# Patient Record
Sex: MALE | Race: WHITE | NOT HISPANIC OR LATINO | Employment: OTHER | URBAN - METROPOLITAN AREA
[De-identification: names, ages, dates, MRNs, and addresses within clinical notes are randomized per-mention and may not be internally consistent; named-entity substitution may affect disease eponyms.]

---

## 2020-04-24 DIAGNOSIS — Z01.84 IMMUNITY STATUS TESTING: Primary | ICD-10-CM

## 2020-04-27 ENCOUNTER — LAB VISIT (OUTPATIENT)
Dept: LAB | Facility: HOSPITAL | Age: 65
End: 2020-04-27
Attending: FAMILY MEDICINE
Payer: MEDICARE

## 2020-04-27 DIAGNOSIS — Z01.84 IMMUNITY STATUS TESTING: ICD-10-CM

## 2020-04-27 LAB — SARS-COV-2 IGG SERPL QL IA: NEGATIVE

## 2020-04-27 PROCEDURE — 86769 SARS-COV-2 COVID-19 ANTIBODY: CPT

## 2020-04-27 PROCEDURE — 36415 COLL VENOUS BLD VENIPUNCTURE: CPT

## 2020-12-21 ENCOUNTER — IMMUNIZATION (OUTPATIENT)
Dept: FAMILY MEDICINE | Facility: CLINIC | Age: 65
End: 2020-12-21
Payer: MEDICARE

## 2020-12-21 DIAGNOSIS — Z23 NEED FOR VACCINATION: ICD-10-CM

## 2020-12-21 PROCEDURE — 0001A COVID-19, MRNA, LNP-S, PF, 30 MCG/0.3 ML DOSE VACCINE: CPT | Mod: S$GLB,,, | Performed by: INTERNAL MEDICINE

## 2020-12-21 PROCEDURE — 0001A COVID-19, MRNA, LNP-S, PF, 30 MCG/0.3 ML DOSE VACCINE: ICD-10-PCS | Mod: S$GLB,,, | Performed by: INTERNAL MEDICINE

## 2020-12-21 PROCEDURE — 91300 COVID-19, MRNA, LNP-S, PF, 30 MCG/0.3 ML DOSE VACCINE: CPT | Mod: S$GLB,,, | Performed by: INTERNAL MEDICINE

## 2020-12-21 PROCEDURE — 91300 COVID-19, MRNA, LNP-S, PF, 30 MCG/0.3 ML DOSE VACCINE: ICD-10-PCS | Mod: S$GLB,,, | Performed by: INTERNAL MEDICINE

## 2021-01-06 ENCOUNTER — PATIENT MESSAGE (OUTPATIENT)
Dept: ADMINISTRATIVE | Facility: OTHER | Age: 66
End: 2021-01-06

## 2021-01-11 ENCOUNTER — IMMUNIZATION (OUTPATIENT)
Dept: FAMILY MEDICINE | Facility: CLINIC | Age: 66
End: 2021-01-11
Payer: MEDICARE

## 2021-01-11 DIAGNOSIS — Z23 NEED FOR VACCINATION: ICD-10-CM

## 2021-01-11 PROCEDURE — 91300 COVID-19, MRNA, LNP-S, PF, 30 MCG/0.3 ML DOSE VACCINE: ICD-10-PCS | Mod: S$GLB,,, | Performed by: INTERNAL MEDICINE

## 2021-01-11 PROCEDURE — 91300 COVID-19, MRNA, LNP-S, PF, 30 MCG/0.3 ML DOSE VACCINE: CPT | Mod: S$GLB,,, | Performed by: INTERNAL MEDICINE

## 2021-01-11 PROCEDURE — 0002A COVID-19, MRNA, LNP-S, PF, 30 MCG/0.3 ML DOSE VACCINE: CPT | Mod: CV19,S$GLB,, | Performed by: INTERNAL MEDICINE

## 2021-01-11 PROCEDURE — 0002A COVID-19, MRNA, LNP-S, PF, 30 MCG/0.3 ML DOSE VACCINE: ICD-10-PCS | Mod: CV19,S$GLB,, | Performed by: INTERNAL MEDICINE

## 2021-08-04 ENCOUNTER — TELEPHONE (OUTPATIENT)
Dept: ORTHOPEDICS | Facility: CLINIC | Age: 66
End: 2021-08-04

## 2021-08-10 DIAGNOSIS — M25.512 LEFT SHOULDER PAIN, UNSPECIFIED CHRONICITY: Primary | ICD-10-CM

## 2021-08-10 DIAGNOSIS — M25.512 LEFT SHOULDER PAIN, UNSPECIFIED CHRONICITY: ICD-10-CM

## 2021-08-16 ENCOUNTER — HOSPITAL ENCOUNTER (OUTPATIENT)
Dept: RADIOLOGY | Facility: HOSPITAL | Age: 66
Discharge: HOME | End: 2021-08-16
Attending: ORTHOPAEDIC SURGERY
Payer: MEDICARE

## 2021-08-16 DIAGNOSIS — M25.512 LEFT SHOULDER PAIN, UNSPECIFIED CHRONICITY: ICD-10-CM

## 2021-08-16 PROCEDURE — 73221 MRI JOINT UPR EXTREM W/O DYE: CPT | Mod: TC,PO,LT

## 2021-08-20 ENCOUNTER — OFFICE VISIT (OUTPATIENT)
Dept: ORTHOPEDICS | Facility: CLINIC | Age: 66
End: 2021-08-20
Payer: MEDICARE

## 2021-08-20 ENCOUNTER — HOSPITAL ENCOUNTER (OUTPATIENT)
Dept: RADIOLOGY | Facility: HOSPITAL | Age: 66
Discharge: HOME | End: 2021-08-20
Attending: ORTHOPAEDIC SURGERY
Payer: MEDICARE

## 2021-08-20 VITALS — RESPIRATION RATE: 16 BRPM

## 2021-08-20 DIAGNOSIS — M75.122 NONTRAUMATIC COMPLETE TEAR OF LEFT ROTATOR CUFF: Primary | ICD-10-CM

## 2021-08-20 DIAGNOSIS — M25.512 LEFT SHOULDER PAIN, UNSPECIFIED CHRONICITY: ICD-10-CM

## 2021-08-20 DIAGNOSIS — Z01.818 PRE-OP TESTING: ICD-10-CM

## 2021-08-20 PROCEDURE — 99212 OFFICE O/P EST SF 10 MIN: CPT | Mod: PBBFAC,PN | Performed by: ORTHOPAEDIC SURGERY

## 2021-08-20 PROCEDURE — 99212 PR OFFICE/OUTPT VISIT, EST, LEVL II, 10-19 MIN: ICD-10-PCS | Mod: S$PBB,,, | Performed by: ORTHOPAEDIC SURGERY

## 2021-08-20 PROCEDURE — 99999 PR PBB SHADOW E&M-EST. PATIENT-LVL II: CPT | Mod: PBBFAC,,, | Performed by: ORTHOPAEDIC SURGERY

## 2021-08-20 PROCEDURE — 99212 OFFICE O/P EST SF 10 MIN: CPT | Mod: S$PBB,,, | Performed by: ORTHOPAEDIC SURGERY

## 2021-08-20 PROCEDURE — 73030 XR SHOULDER COMPLETE 2 OR MORE VIEWS LEFT: ICD-10-PCS | Mod: 26,LT,, | Performed by: RADIOLOGY

## 2021-08-20 PROCEDURE — 99999 PR PBB SHADOW E&M-EST. PATIENT-LVL II: ICD-10-PCS | Mod: PBBFAC,,, | Performed by: ORTHOPAEDIC SURGERY

## 2021-08-20 PROCEDURE — 73030 X-RAY EXAM OF SHOULDER: CPT | Mod: TC,PN,LT

## 2021-08-20 PROCEDURE — 73030 X-RAY EXAM OF SHOULDER: CPT | Mod: 26,LT,, | Performed by: RADIOLOGY

## 2021-08-20 RX ORDER — CEFAZOLIN SODIUM/D5W 2 G/100 ML
2 PLASTIC BAG, INJECTION (ML) INTRAVENOUS
Status: CANCELLED | OUTPATIENT
Start: 2021-08-20

## 2021-09-10 ENCOUNTER — HOSPITAL ENCOUNTER (OUTPATIENT)
Dept: PREADMISSION TESTING | Facility: HOSPITAL | Age: 66
Discharge: HOME | End: 2021-09-10
Attending: ORTHOPAEDIC SURGERY
Payer: MEDICARE

## 2021-09-10 ENCOUNTER — HOSPITAL ENCOUNTER (OUTPATIENT)
Dept: RADIOLOGY | Facility: HOSPITAL | Age: 66
Discharge: HOME | End: 2021-09-10
Attending: ORTHOPAEDIC SURGERY
Payer: MEDICARE

## 2021-09-10 VITALS
HEIGHT: 69 IN | OXYGEN SATURATION: 97 % | HEART RATE: 56 BPM | TEMPERATURE: 98 F | SYSTOLIC BLOOD PRESSURE: 141 MMHG | RESPIRATION RATE: 14 BRPM | BODY MASS INDEX: 25.18 KG/M2 | WEIGHT: 170 LBS | DIASTOLIC BLOOD PRESSURE: 88 MMHG

## 2021-09-10 DIAGNOSIS — M75.122 NONTRAUMATIC COMPLETE TEAR OF LEFT ROTATOR CUFF: ICD-10-CM

## 2021-09-10 DIAGNOSIS — Z01.818 PRE-OP TESTING: ICD-10-CM

## 2021-09-10 LAB
ALBUMIN SERPL BCP-MCNC: 3.9 G/DL (ref 3.5–5.2)
ALP SERPL-CCNC: 61 U/L (ref 55–135)
ALT SERPL W/O P-5'-P-CCNC: 24 U/L (ref 10–44)
ANION GAP SERPL CALC-SCNC: 8 MMOL/L (ref 8–16)
AST SERPL-CCNC: 22 U/L (ref 10–40)
BASOPHILS # BLD AUTO: 0.02 K/UL (ref 0–0.2)
BASOPHILS NFR BLD: 0.4 % (ref 0–1.9)
BILIRUB SERPL-MCNC: 0.9 MG/DL (ref 0.1–1)
BUN SERPL-MCNC: 24 MG/DL (ref 8–23)
CALCIUM SERPL-MCNC: 9.6 MG/DL (ref 8.7–10.5)
CHLORIDE SERPL-SCNC: 104 MMOL/L (ref 95–110)
CO2 SERPL-SCNC: 31 MMOL/L (ref 23–29)
CREAT SERPL-MCNC: 1 MG/DL (ref 0.5–1.4)
DIFFERENTIAL METHOD: NORMAL
EOSINOPHIL # BLD AUTO: 0.1 K/UL (ref 0–0.5)
EOSINOPHIL NFR BLD: 1.5 % (ref 0–8)
ERYTHROCYTE [DISTWIDTH] IN BLOOD BY AUTOMATED COUNT: 13.8 % (ref 11.5–14.5)
EST. GFR  (AFRICAN AMERICAN): >60 ML/MIN/1.73 M^2
EST. GFR  (NON AFRICAN AMERICAN): >60 ML/MIN/1.73 M^2
GLUCOSE SERPL-MCNC: 97 MG/DL (ref 70–110)
HCT VFR BLD AUTO: 43.5 % (ref 40–54)
HGB BLD-MCNC: 14.3 G/DL (ref 14–18)
IMM GRANULOCYTES # BLD AUTO: 0.01 K/UL (ref 0–0.04)
IMM GRANULOCYTES NFR BLD AUTO: 0.2 % (ref 0–0.5)
LYMPHOCYTES # BLD AUTO: 1.4 K/UL (ref 1–4.8)
LYMPHOCYTES NFR BLD: 24.9 % (ref 18–48)
MCH RBC QN AUTO: 30.6 PG (ref 27–31)
MCHC RBC AUTO-ENTMCNC: 32.9 G/DL (ref 32–36)
MCV RBC AUTO: 93 FL (ref 82–98)
MONOCYTES # BLD AUTO: 0.6 K/UL (ref 0.3–1)
MONOCYTES NFR BLD: 11.2 % (ref 4–15)
NEUTROPHILS # BLD AUTO: 3.4 K/UL (ref 1.8–7.7)
NEUTROPHILS NFR BLD: 61.8 % (ref 38–73)
NRBC BLD-RTO: 0 /100 WBC
PLATELET # BLD AUTO: 190 K/UL (ref 150–450)
PMV BLD AUTO: 11.4 FL (ref 9.2–12.9)
POTASSIUM SERPL-SCNC: 4.1 MMOL/L (ref 3.5–5.1)
PROT SERPL-MCNC: 6.8 G/DL (ref 6–8.4)
RBC # BLD AUTO: 4.67 M/UL (ref 4.6–6.2)
SODIUM SERPL-SCNC: 143 MMOL/L (ref 136–145)
WBC # BLD AUTO: 5.47 K/UL (ref 3.9–12.7)

## 2021-09-10 PROCEDURE — 80053 COMPREHEN METABOLIC PANEL: CPT | Performed by: ORTHOPAEDIC SURGERY

## 2021-09-10 PROCEDURE — 93005 ELECTROCARDIOGRAM TRACING: CPT | Performed by: INTERNAL MEDICINE

## 2021-09-10 PROCEDURE — 36415 COLL VENOUS BLD VENIPUNCTURE: CPT | Performed by: ORTHOPAEDIC SURGERY

## 2021-09-10 PROCEDURE — 85025 COMPLETE CBC W/AUTO DIFF WBC: CPT | Performed by: ORTHOPAEDIC SURGERY

## 2021-09-10 PROCEDURE — 71046 X-RAY EXAM CHEST 2 VIEWS: CPT | Mod: TC

## 2021-09-10 PROCEDURE — 93010 ELECTROCARDIOGRAM REPORT: CPT | Mod: ,,, | Performed by: INTERNAL MEDICINE

## 2021-09-10 PROCEDURE — 93010 EKG 12-LEAD: ICD-10-PCS | Mod: ,,, | Performed by: INTERNAL MEDICINE

## 2021-09-10 RX ORDER — ORPHENADRINE CITRATE 100 MG/1
100 TABLET, EXTENDED RELEASE ORAL 2 TIMES DAILY
COMMUNITY
End: 2021-09-22 | Stop reason: CLARIF

## 2021-09-10 RX ORDER — CELECOXIB 100 MG/1
100 CAPSULE ORAL 2 TIMES DAILY
COMMUNITY
End: 2021-09-22 | Stop reason: CLARIF

## 2021-09-20 ENCOUNTER — HOSPITAL ENCOUNTER (OUTPATIENT)
Dept: PREADMISSION TESTING | Facility: HOSPITAL | Age: 66
Discharge: HOME | End: 2021-09-20
Attending: ORTHOPAEDIC SURGERY
Payer: MEDICARE

## 2021-09-20 DIAGNOSIS — Z01.818 PRE-OP TESTING: ICD-10-CM

## 2021-09-20 LAB — SARS-COV-2 RDRP RESP QL NAA+PROBE: NEGATIVE

## 2021-09-20 PROCEDURE — U0002 COVID-19 LAB TEST NON-CDC: HCPCS | Performed by: ORTHOPAEDIC SURGERY

## 2021-09-23 ENCOUNTER — ANESTHESIA EVENT (OUTPATIENT)
Dept: SURGERY | Facility: HOSPITAL | Age: 66
End: 2021-09-23
Payer: MEDICARE

## 2021-09-23 ENCOUNTER — HOSPITAL ENCOUNTER (OUTPATIENT)
Facility: HOSPITAL | Age: 66
Discharge: HOME | End: 2021-09-23
Attending: ORTHOPAEDIC SURGERY | Admitting: ORTHOPAEDIC SURGERY
Payer: MEDICARE

## 2021-09-23 ENCOUNTER — ANESTHESIA (OUTPATIENT)
Dept: SURGERY | Facility: HOSPITAL | Age: 66
End: 2021-09-23
Payer: MEDICARE

## 2021-09-23 VITALS
OXYGEN SATURATION: 96 % | HEART RATE: 70 BPM | RESPIRATION RATE: 16 BRPM | TEMPERATURE: 98 F | DIASTOLIC BLOOD PRESSURE: 82 MMHG | SYSTOLIC BLOOD PRESSURE: 143 MMHG

## 2021-09-23 DIAGNOSIS — M75.122 NONTRAUMATIC COMPLETE TEAR OF LEFT ROTATOR CUFF: ICD-10-CM

## 2021-09-23 PROCEDURE — 63600175 PHARM REV CODE 636 W HCPCS: Performed by: ANESTHESIOLOGY

## 2021-09-23 PROCEDURE — 27201423 OPTIME MED/SURG SUP & DEVICES STERILE SUPPLY: Performed by: ORTHOPAEDIC SURGERY

## 2021-09-23 PROCEDURE — 71000015 HC POSTOP RECOV 1ST HR: Performed by: ORTHOPAEDIC SURGERY

## 2021-09-23 PROCEDURE — 76942 ECHO GUIDE FOR BIOPSY: CPT | Performed by: ANESTHESIOLOGY

## 2021-09-23 PROCEDURE — 27000080 OPTIME MED/SURG SUP & DEVICES GENERAL CLASSIFICATION: Performed by: ORTHOPAEDIC SURGERY

## 2021-09-23 PROCEDURE — 25000003 PHARM REV CODE 250: Performed by: NURSE ANESTHETIST, CERTIFIED REGISTERED

## 2021-09-23 PROCEDURE — 63600175 PHARM REV CODE 636 W HCPCS: Performed by: NURSE ANESTHETIST, CERTIFIED REGISTERED

## 2021-09-23 PROCEDURE — 23412 REPAIR ROTATOR CUFF CHRONIC: CPT | Mod: LT,,, | Performed by: ORTHOPAEDIC SURGERY

## 2021-09-23 PROCEDURE — 37000008 HC ANESTHESIA 1ST 15 MINUTES: Performed by: ORTHOPAEDIC SURGERY

## 2021-09-23 PROCEDURE — C1713 ANCHOR/SCREW BN/BN,TIS/BN: HCPCS | Performed by: ORTHOPAEDIC SURGERY

## 2021-09-23 PROCEDURE — 64415 NJX AA&/STRD BRCH PLXS IMG: CPT | Performed by: ANESTHESIOLOGY

## 2021-09-23 PROCEDURE — 36000711: Performed by: ORTHOPAEDIC SURGERY

## 2021-09-23 PROCEDURE — 36000710: Performed by: ORTHOPAEDIC SURGERY

## 2021-09-23 PROCEDURE — 63600175 PHARM REV CODE 636 W HCPCS: Performed by: ORTHOPAEDIC SURGERY

## 2021-09-23 PROCEDURE — 23412 PR REPAIR ROTATOR CUFF,CHRONIC: ICD-10-PCS | Mod: LT,,, | Performed by: ORTHOPAEDIC SURGERY

## 2021-09-23 PROCEDURE — 71000033 HC RECOVERY, INTIAL HOUR: Performed by: ORTHOPAEDIC SURGERY

## 2021-09-23 PROCEDURE — 37000009 HC ANESTHESIA EA ADD 15 MINS: Performed by: ORTHOPAEDIC SURGERY

## 2021-09-23 DEVICE — ANCHOR REELX 4.5MM   3910-600-062: Type: IMPLANTABLE DEVICE | Site: SHOULDER | Status: FUNCTIONAL

## 2021-09-23 RX ORDER — EPHEDRINE SULFATE 50 MG/ML
INJECTION, SOLUTION INTRAVENOUS
Status: DISCONTINUED | OUTPATIENT
Start: 2021-09-23 | End: 2021-09-23

## 2021-09-23 RX ORDER — MEPERIDINE HYDROCHLORIDE 50 MG/ML
12.5 INJECTION INTRAMUSCULAR; INTRAVENOUS; SUBCUTANEOUS EVERY 10 MIN PRN
Status: DISCONTINUED | OUTPATIENT
Start: 2021-09-23 | End: 2021-09-23 | Stop reason: HOSPADM

## 2021-09-23 RX ORDER — ONDANSETRON 2 MG/ML
4 INJECTION INTRAMUSCULAR; INTRAVENOUS EVERY 12 HOURS PRN
Status: DISCONTINUED | OUTPATIENT
Start: 2021-09-23 | End: 2021-09-23 | Stop reason: HOSPADM

## 2021-09-23 RX ORDER — OXYCODONE HYDROCHLORIDE 5 MG/1
10 TABLET ORAL EVERY 4 HOURS PRN
Status: DISCONTINUED | OUTPATIENT
Start: 2021-09-23 | End: 2021-09-23 | Stop reason: HOSPADM

## 2021-09-23 RX ORDER — SODIUM CHLORIDE 0.9 % (FLUSH) 0.9 %
10 SYRINGE (ML) INJECTION
Status: DISCONTINUED | OUTPATIENT
Start: 2021-09-23 | End: 2021-09-23 | Stop reason: HOSPADM

## 2021-09-23 RX ORDER — CEFAZOLIN SODIUM 1 G/3ML
INJECTION, POWDER, FOR SOLUTION INTRAMUSCULAR; INTRAVENOUS
Status: DISCONTINUED | OUTPATIENT
Start: 2021-09-23 | End: 2021-09-23

## 2021-09-23 RX ORDER — ONDANSETRON 4 MG/1
4 TABLET, FILM COATED ORAL EVERY 6 HOURS PRN
Qty: 20 TABLET | Refills: 0
Start: 2021-09-23 | End: 2022-05-05 | Stop reason: ALTCHOICE

## 2021-09-23 RX ORDER — HYDROMORPHONE HYDROCHLORIDE 1 MG/ML
0.2 INJECTION, SOLUTION INTRAMUSCULAR; INTRAVENOUS; SUBCUTANEOUS
Status: DISCONTINUED | OUTPATIENT
Start: 2021-09-23 | End: 2021-09-23 | Stop reason: HOSPADM

## 2021-09-23 RX ORDER — EPINEPHRINE 1 MG/ML
INJECTION, SOLUTION INTRACARDIAC; INTRAMUSCULAR; INTRAVENOUS; SUBCUTANEOUS
Status: DISCONTINUED | OUTPATIENT
Start: 2021-09-23 | End: 2021-09-23 | Stop reason: HOSPADM

## 2021-09-23 RX ORDER — ACETAMINOPHEN 10 MG/ML
INJECTION, SOLUTION INTRAVENOUS
Status: DISCONTINUED | OUTPATIENT
Start: 2021-09-23 | End: 2021-09-23

## 2021-09-23 RX ORDER — SODIUM CHLORIDE, SODIUM LACTATE, POTASSIUM CHLORIDE, CALCIUM CHLORIDE 600; 310; 30; 20 MG/100ML; MG/100ML; MG/100ML; MG/100ML
INJECTION, SOLUTION INTRAVENOUS CONTINUOUS PRN
Status: DISCONTINUED | OUTPATIENT
Start: 2021-09-23 | End: 2021-09-23

## 2021-09-23 RX ORDER — MIDAZOLAM HYDROCHLORIDE 1 MG/ML
INJECTION INTRAMUSCULAR; INTRAVENOUS
Status: DISCONTINUED | OUTPATIENT
Start: 2021-09-23 | End: 2021-09-23

## 2021-09-23 RX ORDER — OXYCODONE HYDROCHLORIDE 5 MG/1
5 TABLET ORAL
Status: DISCONTINUED | OUTPATIENT
Start: 2021-09-23 | End: 2021-09-23 | Stop reason: HOSPADM

## 2021-09-23 RX ORDER — DEXAMETHASONE SODIUM PHOSPHATE 4 MG/ML
INJECTION, SOLUTION INTRA-ARTICULAR; INTRALESIONAL; INTRAMUSCULAR; INTRAVENOUS; SOFT TISSUE
Status: DISCONTINUED | OUTPATIENT
Start: 2021-09-23 | End: 2021-09-23

## 2021-09-23 RX ORDER — DIPHENHYDRAMINE HYDROCHLORIDE 50 MG/ML
12.5 INJECTION INTRAMUSCULAR; INTRAVENOUS
Status: DISCONTINUED | OUTPATIENT
Start: 2021-09-23 | End: 2021-09-23 | Stop reason: HOSPADM

## 2021-09-23 RX ORDER — LIDOCAINE HYDROCHLORIDE 20 MG/ML
INJECTION, SOLUTION EPIDURAL; INFILTRATION; INTRACAUDAL; PERINEURAL
Status: DISCONTINUED | OUTPATIENT
Start: 2021-09-23 | End: 2021-09-23

## 2021-09-23 RX ORDER — CEFAZOLIN SODIUM 2 G/50ML
2 SOLUTION INTRAVENOUS
Status: DISCONTINUED | OUTPATIENT
Start: 2021-09-23 | End: 2021-09-23 | Stop reason: HOSPADM

## 2021-09-23 RX ORDER — ONDANSETRON 2 MG/ML
4 INJECTION INTRAMUSCULAR; INTRAVENOUS DAILY PRN
Status: DISCONTINUED | OUTPATIENT
Start: 2021-09-23 | End: 2021-09-23 | Stop reason: HOSPADM

## 2021-09-23 RX ORDER — LIDOCAINE HYDROCHLORIDE 20 MG/ML
JELLY TOPICAL
Status: DISCONTINUED | OUTPATIENT
Start: 2021-09-23 | End: 2021-09-23

## 2021-09-23 RX ORDER — OXYCODONE AND ACETAMINOPHEN 10; 325 MG/1; MG/1
1 TABLET ORAL EVERY 4 HOURS PRN
Qty: 30 TABLET | Refills: 0 | Status: SHIPPED | OUTPATIENT
Start: 2021-09-23 | End: 2022-05-05 | Stop reason: ALTCHOICE

## 2021-09-23 RX ORDER — PROPOFOL 10 MG/ML
VIAL (ML) INTRAVENOUS
Status: DISCONTINUED | OUTPATIENT
Start: 2021-09-23 | End: 2021-09-23

## 2021-09-23 RX ORDER — PHENYLEPHRINE HYDROCHLORIDE 10 MG/ML
INJECTION INTRAVENOUS
Status: DISCONTINUED | OUTPATIENT
Start: 2021-09-23 | End: 2021-09-23

## 2021-09-23 RX ORDER — ROCURONIUM BROMIDE 10 MG/ML
INJECTION, SOLUTION INTRAVENOUS
Status: DISCONTINUED | OUTPATIENT
Start: 2021-09-23 | End: 2021-09-23

## 2021-09-23 RX ORDER — FAMOTIDINE 10 MG/ML
INJECTION INTRAVENOUS
Status: DISCONTINUED | OUTPATIENT
Start: 2021-09-23 | End: 2021-09-23

## 2021-09-23 RX ORDER — FENTANYL CITRATE 50 UG/ML
INJECTION, SOLUTION INTRAMUSCULAR; INTRAVENOUS
Status: DISCONTINUED | OUTPATIENT
Start: 2021-09-23 | End: 2021-09-23

## 2021-09-23 RX ORDER — ONDANSETRON 2 MG/ML
INJECTION INTRAMUSCULAR; INTRAVENOUS
Status: DISCONTINUED | OUTPATIENT
Start: 2021-09-23 | End: 2021-09-23

## 2021-09-23 RX ADMIN — EPHEDRINE SULFATE 10 MG: 50 INJECTION INTRAVENOUS at 07:09

## 2021-09-23 RX ADMIN — MIDAZOLAM HYDROCHLORIDE 2 MG: 1 INJECTION, SOLUTION INTRAMUSCULAR; INTRAVENOUS at 06:09

## 2021-09-23 RX ADMIN — DEXAMETHASONE SODIUM PHOSPHATE 8 MG: 4 INJECTION, SOLUTION INTRAMUSCULAR; INTRAVENOUS at 07:09

## 2021-09-23 RX ADMIN — SODIUM CHLORIDE, SODIUM LACTATE, POTASSIUM CHLORIDE, AND CALCIUM CHLORIDE: .6; .31; .03; .02 INJECTION, SOLUTION INTRAVENOUS at 06:09

## 2021-09-23 RX ADMIN — CEFAZOLIN 2 G: 330 INJECTION, POWDER, FOR SOLUTION INTRAMUSCULAR; INTRAVENOUS at 07:09

## 2021-09-23 RX ADMIN — PHENYLEPHRINE HYDROCHLORIDE 200 MCG: 10 INJECTION INTRAVENOUS at 07:09

## 2021-09-23 RX ADMIN — LIDOCAINE HYDROCHLORIDE 100 MG: 20 INJECTION, SOLUTION EPIDURAL; INFILTRATION; INTRACAUDAL; PERINEURAL at 07:09

## 2021-09-23 RX ADMIN — ONDANSETRON 4 MG: 2 INJECTION INTRAMUSCULAR; INTRAVENOUS at 07:09

## 2021-09-23 RX ADMIN — ROCURONIUM BROMIDE 50 MG: 10 INJECTION, SOLUTION INTRAVENOUS at 07:09

## 2021-09-23 RX ADMIN — FAMOTIDINE 20 MG: 10 INJECTION, SOLUTION INTRAVENOUS at 07:09

## 2021-09-23 RX ADMIN — PROPOFOL 160 MG: 10 INJECTION, EMULSION INTRAVENOUS at 07:09

## 2021-09-23 RX ADMIN — ACETAMINOPHEN 1000 MG: 10 INJECTION, SOLUTION INTRAVENOUS at 07:09

## 2021-09-23 RX ADMIN — LIDOCAINE HYDROCHLORIDE 4 ML: 20 JELLY TOPICAL at 07:09

## 2021-09-23 RX ADMIN — GLYCOPYRROLATE 0.2 MG: 0.2 INJECTION, SOLUTION INTRAMUSCULAR; INTRAVITREAL at 07:09

## 2021-09-23 RX ADMIN — EPHEDRINE SULFATE 10 MG: 50 INJECTION INTRAVENOUS at 08:09

## 2021-09-23 RX ADMIN — SUGAMMADEX 200 MG: 100 INJECTION, SOLUTION INTRAVENOUS at 09:09

## 2021-09-23 RX ADMIN — FENTANYL CITRATE 100 MCG: 50 INJECTION INTRAMUSCULAR; INTRAVENOUS at 06:09

## 2021-09-24 ENCOUNTER — TELEPHONE (OUTPATIENT)
Dept: ANESTHESIOLOGY | Facility: HOSPITAL | Age: 66
End: 2021-09-24

## 2021-09-24 DIAGNOSIS — M75.122 NONTRAUMATIC COMPLETE TEAR OF LEFT ROTATOR CUFF: Primary | ICD-10-CM

## 2021-09-28 ENCOUNTER — OFFICE VISIT (OUTPATIENT)
Dept: ORTHOPEDICS | Facility: CLINIC | Age: 66
End: 2021-09-28
Payer: MEDICARE

## 2021-09-28 ENCOUNTER — HOSPITAL ENCOUNTER (OUTPATIENT)
Dept: RADIOLOGY | Facility: HOSPITAL | Age: 66
Discharge: HOME | End: 2021-09-28
Attending: ORTHOPAEDIC SURGERY
Payer: MEDICARE

## 2021-09-28 VITALS — RESPIRATION RATE: 16 BRPM | HEIGHT: 69 IN | WEIGHT: 170 LBS | BODY MASS INDEX: 25.18 KG/M2

## 2021-09-28 DIAGNOSIS — M75.122 NONTRAUMATIC COMPLETE TEAR OF LEFT ROTATOR CUFF: Primary | ICD-10-CM

## 2021-09-28 DIAGNOSIS — M75.122 NONTRAUMATIC COMPLETE TEAR OF LEFT ROTATOR CUFF: ICD-10-CM

## 2021-09-28 PROCEDURE — 99213 OFFICE O/P EST LOW 20 MIN: CPT | Mod: PBBFAC,PN | Performed by: ORTHOPAEDIC SURGERY

## 2021-09-28 PROCEDURE — 99999 PR PBB SHADOW E&M-EST. PATIENT-LVL III: CPT | Mod: PBBFAC,,, | Performed by: ORTHOPAEDIC SURGERY

## 2021-09-28 PROCEDURE — 99999 PR PBB SHADOW E&M-EST. PATIENT-LVL III: ICD-10-PCS | Mod: PBBFAC,,, | Performed by: ORTHOPAEDIC SURGERY

## 2021-09-28 PROCEDURE — 99024 PR POST-OP FOLLOW-UP VISIT: ICD-10-PCS | Mod: POP,,, | Performed by: ORTHOPAEDIC SURGERY

## 2021-09-28 PROCEDURE — 73030 X-RAY EXAM OF SHOULDER: CPT | Mod: TC,PN,LT

## 2021-09-28 PROCEDURE — 73030 X-RAY EXAM OF SHOULDER: CPT | Mod: 26,LT,, | Performed by: RADIOLOGY

## 2021-09-28 PROCEDURE — 99024 POSTOP FOLLOW-UP VISIT: CPT | Mod: POP,,, | Performed by: ORTHOPAEDIC SURGERY

## 2021-09-28 PROCEDURE — 73030 XR SHOULDER COMPLETE 2 OR MORE VIEWS LEFT: ICD-10-PCS | Mod: 26,LT,, | Performed by: RADIOLOGY

## 2021-09-30 ENCOUNTER — CLINICAL SUPPORT (OUTPATIENT)
Dept: REHABILITATION | Facility: HOSPITAL | Age: 66
End: 2021-09-30
Attending: ORTHOPAEDIC SURGERY
Payer: MEDICARE

## 2021-09-30 DIAGNOSIS — M75.122 NONTRAUMATIC COMPLETE TEAR OF LEFT ROTATOR CUFF: ICD-10-CM

## 2021-09-30 PROCEDURE — 97161 PT EVAL LOW COMPLEX 20 MIN: CPT | Mod: PN

## 2021-09-30 PROCEDURE — 97110 THERAPEUTIC EXERCISES: CPT | Mod: PN

## 2021-10-06 ENCOUNTER — CLINICAL SUPPORT (OUTPATIENT)
Dept: REHABILITATION | Facility: HOSPITAL | Age: 66
End: 2021-10-06
Attending: ORTHOPAEDIC SURGERY
Payer: MEDICARE

## 2021-10-06 DIAGNOSIS — M25.612 DECREASED RANGE OF MOTION OF LEFT SHOULDER: ICD-10-CM

## 2021-10-06 DIAGNOSIS — M62.81 MUSCLE WEAKNESS OF LEFT UPPER EXTREMITY: ICD-10-CM

## 2021-10-06 PROCEDURE — 97110 THERAPEUTIC EXERCISES: CPT | Mod: PN

## 2021-10-06 PROCEDURE — 97140 MANUAL THERAPY 1/> REGIONS: CPT | Mod: PN

## 2021-10-12 ENCOUNTER — CLINICAL SUPPORT (OUTPATIENT)
Dept: REHABILITATION | Facility: HOSPITAL | Age: 66
End: 2021-10-12
Attending: ORTHOPAEDIC SURGERY
Payer: MEDICARE

## 2021-10-12 DIAGNOSIS — M62.81 MUSCLE WEAKNESS OF LEFT UPPER EXTREMITY: ICD-10-CM

## 2021-10-12 DIAGNOSIS — M25.612 DECREASED RANGE OF MOTION OF LEFT SHOULDER: ICD-10-CM

## 2021-10-12 PROCEDURE — 97110 THERAPEUTIC EXERCISES: CPT | Mod: PN

## 2021-10-12 PROCEDURE — 97140 MANUAL THERAPY 1/> REGIONS: CPT | Mod: PN

## 2021-10-20 ENCOUNTER — CLINICAL SUPPORT (OUTPATIENT)
Dept: REHABILITATION | Facility: HOSPITAL | Age: 66
End: 2021-10-20
Attending: ORTHOPAEDIC SURGERY
Payer: MEDICARE

## 2021-10-20 DIAGNOSIS — M62.81 MUSCLE WEAKNESS OF LEFT UPPER EXTREMITY: ICD-10-CM

## 2021-10-20 DIAGNOSIS — M25.612 DECREASED RANGE OF MOTION OF LEFT SHOULDER: ICD-10-CM

## 2021-10-20 PROCEDURE — 97110 THERAPEUTIC EXERCISES: CPT | Mod: PN

## 2021-10-20 PROCEDURE — 97140 MANUAL THERAPY 1/> REGIONS: CPT | Mod: PN

## 2021-10-22 ENCOUNTER — OFFICE VISIT (OUTPATIENT)
Dept: ORTHOPEDICS | Facility: CLINIC | Age: 66
End: 2021-10-22
Payer: MEDICARE

## 2021-10-22 VITALS — BODY MASS INDEX: 25.18 KG/M2 | HEIGHT: 69 IN | WEIGHT: 170 LBS

## 2021-10-22 DIAGNOSIS — M75.122 NONTRAUMATIC COMPLETE TEAR OF LEFT ROTATOR CUFF: Primary | ICD-10-CM

## 2021-10-22 PROCEDURE — 99024 PR POST-OP FOLLOW-UP VISIT: ICD-10-PCS | Mod: POP,,, | Performed by: ORTHOPAEDIC SURGERY

## 2021-10-22 PROCEDURE — 99024 POSTOP FOLLOW-UP VISIT: CPT | Mod: POP,,, | Performed by: ORTHOPAEDIC SURGERY

## 2021-10-22 PROCEDURE — 99999 PR PBB SHADOW E&M-EST. PATIENT-LVL III: CPT | Mod: PBBFAC,,, | Performed by: ORTHOPAEDIC SURGERY

## 2021-10-22 PROCEDURE — 99213 OFFICE O/P EST LOW 20 MIN: CPT | Mod: PBBFAC,PN | Performed by: ORTHOPAEDIC SURGERY

## 2021-10-22 PROCEDURE — 99999 PR PBB SHADOW E&M-EST. PATIENT-LVL III: ICD-10-PCS | Mod: PBBFAC,,, | Performed by: ORTHOPAEDIC SURGERY

## 2021-10-22 RX ORDER — ACETAMINOPHEN 325 MG/1
325 TABLET ORAL EVERY 6 HOURS PRN
COMMUNITY
End: 2023-05-04

## 2021-10-26 ENCOUNTER — CLINICAL SUPPORT (OUTPATIENT)
Dept: REHABILITATION | Facility: HOSPITAL | Age: 66
End: 2021-10-26
Attending: ORTHOPAEDIC SURGERY
Payer: MEDICARE

## 2021-10-26 DIAGNOSIS — M75.122 NONTRAUMATIC COMPLETE TEAR OF LEFT ROTATOR CUFF: ICD-10-CM

## 2021-10-26 DIAGNOSIS — M62.81 MUSCLE WEAKNESS OF LEFT UPPER EXTREMITY: ICD-10-CM

## 2021-10-26 DIAGNOSIS — M25.612 DECREASED RANGE OF MOTION OF LEFT SHOULDER: ICD-10-CM

## 2021-10-26 PROCEDURE — 97140 MANUAL THERAPY 1/> REGIONS: CPT | Mod: PN

## 2021-10-26 PROCEDURE — 97110 THERAPEUTIC EXERCISES: CPT | Mod: PN

## 2021-10-28 ENCOUNTER — CLINICAL SUPPORT (OUTPATIENT)
Dept: REHABILITATION | Facility: HOSPITAL | Age: 66
End: 2021-10-28
Attending: ORTHOPAEDIC SURGERY
Payer: MEDICARE

## 2021-10-28 DIAGNOSIS — M25.612 DECREASED RANGE OF MOTION OF LEFT SHOULDER: ICD-10-CM

## 2021-10-28 DIAGNOSIS — M62.81 MUSCLE WEAKNESS OF LEFT UPPER EXTREMITY: ICD-10-CM

## 2021-10-28 PROCEDURE — 97140 MANUAL THERAPY 1/> REGIONS: CPT | Mod: PN

## 2021-10-28 PROCEDURE — 97110 THERAPEUTIC EXERCISES: CPT | Mod: PN

## 2021-11-02 ENCOUNTER — CLINICAL SUPPORT (OUTPATIENT)
Dept: REHABILITATION | Facility: HOSPITAL | Age: 66
End: 2021-11-02
Attending: ORTHOPAEDIC SURGERY
Payer: MEDICARE

## 2021-11-02 DIAGNOSIS — M62.81 MUSCLE WEAKNESS OF LEFT UPPER EXTREMITY: ICD-10-CM

## 2021-11-02 DIAGNOSIS — M25.612 DECREASED RANGE OF MOTION OF LEFT SHOULDER: ICD-10-CM

## 2021-11-02 PROCEDURE — 97110 THERAPEUTIC EXERCISES: CPT | Mod: PN

## 2021-11-02 PROCEDURE — 97140 MANUAL THERAPY 1/> REGIONS: CPT | Mod: PN

## 2021-11-08 ENCOUNTER — CLINICAL SUPPORT (OUTPATIENT)
Dept: REHABILITATION | Facility: HOSPITAL | Age: 66
End: 2021-11-08
Attending: ORTHOPAEDIC SURGERY
Payer: MEDICARE

## 2021-11-08 DIAGNOSIS — M25.612 DECREASED RANGE OF MOTION OF LEFT SHOULDER: ICD-10-CM

## 2021-11-08 DIAGNOSIS — M62.81 MUSCLE WEAKNESS OF LEFT UPPER EXTREMITY: ICD-10-CM

## 2021-11-08 PROCEDURE — 97110 THERAPEUTIC EXERCISES: CPT | Mod: PN

## 2021-11-08 PROCEDURE — 97140 MANUAL THERAPY 1/> REGIONS: CPT | Mod: PN

## 2021-11-10 ENCOUNTER — IMMUNIZATION (OUTPATIENT)
Dept: PRIMARY CARE CLINIC | Facility: CLINIC | Age: 66
End: 2021-11-10

## 2021-11-10 DIAGNOSIS — Z23 NEED FOR VACCINATION: Primary | ICD-10-CM

## 2021-11-10 PROCEDURE — 91301 COVID-19, MRNA, LNP-S, PF, 100 MCG/0.5 ML DOSE VACCINE: CPT | Mod: S$GLB,,, | Performed by: FAMILY MEDICINE

## 2021-11-10 PROCEDURE — 91301 COVID-19, MRNA, LNP-S, PF, 100 MCG/0.5 ML DOSE VACCINE: ICD-10-PCS | Mod: S$GLB,,, | Performed by: FAMILY MEDICINE

## 2021-11-12 ENCOUNTER — CLINICAL SUPPORT (OUTPATIENT)
Dept: REHABILITATION | Facility: HOSPITAL | Age: 66
End: 2021-11-12
Attending: ORTHOPAEDIC SURGERY
Payer: COMMERCIAL

## 2021-11-12 DIAGNOSIS — M25.612 DECREASED RANGE OF MOTION OF LEFT SHOULDER: ICD-10-CM

## 2021-11-12 DIAGNOSIS — M62.81 MUSCLE WEAKNESS OF LEFT UPPER EXTREMITY: ICD-10-CM

## 2021-11-12 PROCEDURE — 97140 MANUAL THERAPY 1/> REGIONS: CPT | Mod: KX,PN

## 2021-11-12 PROCEDURE — 97110 THERAPEUTIC EXERCISES: CPT | Mod: KX,PN

## 2021-11-15 ENCOUNTER — CLINICAL SUPPORT (OUTPATIENT)
Dept: REHABILITATION | Facility: HOSPITAL | Age: 66
End: 2021-11-15
Attending: ORTHOPAEDIC SURGERY
Payer: MEDICARE

## 2021-11-15 DIAGNOSIS — M62.81 MUSCLE WEAKNESS OF LEFT UPPER EXTREMITY: ICD-10-CM

## 2021-11-15 DIAGNOSIS — M25.612 DECREASED RANGE OF MOTION OF LEFT SHOULDER: ICD-10-CM

## 2021-11-15 PROCEDURE — 97110 THERAPEUTIC EXERCISES: CPT | Mod: KX,PN

## 2021-11-15 PROCEDURE — 97140 MANUAL THERAPY 1/> REGIONS: CPT | Mod: KX,PN

## 2021-11-17 ENCOUNTER — CLINICAL SUPPORT (OUTPATIENT)
Dept: REHABILITATION | Facility: HOSPITAL | Age: 66
End: 2021-11-17
Attending: ORTHOPAEDIC SURGERY
Payer: MEDICARE

## 2021-11-17 DIAGNOSIS — M62.81 MUSCLE WEAKNESS OF LEFT UPPER EXTREMITY: ICD-10-CM

## 2021-11-17 DIAGNOSIS — M25.612 DECREASED RANGE OF MOTION OF LEFT SHOULDER: ICD-10-CM

## 2021-11-17 PROCEDURE — 97140 MANUAL THERAPY 1/> REGIONS: CPT | Mod: PN

## 2021-11-17 PROCEDURE — 97110 THERAPEUTIC EXERCISES: CPT | Mod: PN

## 2021-11-19 ENCOUNTER — OFFICE VISIT (OUTPATIENT)
Dept: ORTHOPEDICS | Facility: CLINIC | Age: 66
End: 2021-11-19
Payer: MEDICARE

## 2021-11-19 VITALS — HEIGHT: 69 IN | BODY MASS INDEX: 25.18 KG/M2 | WEIGHT: 170 LBS

## 2021-11-19 DIAGNOSIS — M75.122 NONTRAUMATIC COMPLETE TEAR OF LEFT ROTATOR CUFF: Primary | ICD-10-CM

## 2021-11-19 PROCEDURE — 99212 OFFICE O/P EST SF 10 MIN: CPT | Mod: PBBFAC,PN | Performed by: ORTHOPAEDIC SURGERY

## 2021-11-19 PROCEDURE — 99024 POSTOP FOLLOW-UP VISIT: CPT | Mod: POP,,, | Performed by: ORTHOPAEDIC SURGERY

## 2021-11-19 PROCEDURE — 99024 PR POST-OP FOLLOW-UP VISIT: ICD-10-PCS | Mod: POP,,, | Performed by: ORTHOPAEDIC SURGERY

## 2021-11-19 PROCEDURE — 99999 PR PBB SHADOW E&M-EST. PATIENT-LVL II: ICD-10-PCS | Mod: PBBFAC,,, | Performed by: ORTHOPAEDIC SURGERY

## 2021-11-19 PROCEDURE — 99999 PR PBB SHADOW E&M-EST. PATIENT-LVL II: CPT | Mod: PBBFAC,,, | Performed by: ORTHOPAEDIC SURGERY

## 2021-11-22 ENCOUNTER — CLINICAL SUPPORT (OUTPATIENT)
Dept: REHABILITATION | Facility: HOSPITAL | Age: 66
End: 2021-11-22
Attending: ORTHOPAEDIC SURGERY
Payer: MEDICARE

## 2021-11-22 DIAGNOSIS — M25.612 DECREASED RANGE OF MOTION OF LEFT SHOULDER: ICD-10-CM

## 2021-11-22 DIAGNOSIS — M62.81 MUSCLE WEAKNESS OF LEFT UPPER EXTREMITY: ICD-10-CM

## 2021-11-22 PROCEDURE — 97112 NEUROMUSCULAR REEDUCATION: CPT | Mod: PN

## 2021-11-22 PROCEDURE — 97110 THERAPEUTIC EXERCISES: CPT | Mod: PN

## 2021-11-22 PROCEDURE — 97140 MANUAL THERAPY 1/> REGIONS: CPT | Mod: PN

## 2021-11-29 ENCOUNTER — CLINICAL SUPPORT (OUTPATIENT)
Dept: REHABILITATION | Facility: HOSPITAL | Age: 66
End: 2021-11-29
Attending: ORTHOPAEDIC SURGERY
Payer: MEDICARE

## 2021-11-29 DIAGNOSIS — M25.612 DECREASED RANGE OF MOTION OF LEFT SHOULDER: ICD-10-CM

## 2021-11-29 DIAGNOSIS — M62.81 MUSCLE WEAKNESS OF LEFT UPPER EXTREMITY: ICD-10-CM

## 2021-11-29 PROCEDURE — 97110 THERAPEUTIC EXERCISES: CPT | Mod: KX,PN

## 2021-11-29 PROCEDURE — 97140 MANUAL THERAPY 1/> REGIONS: CPT | Mod: KX,PN

## 2021-11-29 PROCEDURE — 97112 NEUROMUSCULAR REEDUCATION: CPT | Mod: KX,PN

## 2021-12-01 ENCOUNTER — CLINICAL SUPPORT (OUTPATIENT)
Dept: REHABILITATION | Facility: HOSPITAL | Age: 66
End: 2021-12-01
Attending: ORTHOPAEDIC SURGERY
Payer: MEDICARE

## 2021-12-01 DIAGNOSIS — M25.612 DECREASED RANGE OF MOTION OF LEFT SHOULDER: ICD-10-CM

## 2021-12-01 DIAGNOSIS — M62.81 MUSCLE WEAKNESS OF LEFT UPPER EXTREMITY: ICD-10-CM

## 2021-12-01 PROCEDURE — 97110 THERAPEUTIC EXERCISES: CPT | Mod: PN

## 2021-12-01 PROCEDURE — 97140 MANUAL THERAPY 1/> REGIONS: CPT | Mod: PN

## 2021-12-06 ENCOUNTER — CLINICAL SUPPORT (OUTPATIENT)
Dept: REHABILITATION | Facility: HOSPITAL | Age: 66
End: 2021-12-06
Attending: ORTHOPAEDIC SURGERY
Payer: MEDICARE

## 2021-12-06 DIAGNOSIS — M62.81 MUSCLE WEAKNESS OF LEFT UPPER EXTREMITY: ICD-10-CM

## 2021-12-06 DIAGNOSIS — M25.612 DECREASED RANGE OF MOTION OF LEFT SHOULDER: ICD-10-CM

## 2021-12-06 PROCEDURE — 97110 THERAPEUTIC EXERCISES: CPT | Mod: KX,PN

## 2021-12-06 PROCEDURE — 97112 NEUROMUSCULAR REEDUCATION: CPT | Mod: KX,PN

## 2021-12-08 ENCOUNTER — CLINICAL SUPPORT (OUTPATIENT)
Dept: REHABILITATION | Facility: HOSPITAL | Age: 66
End: 2021-12-08
Attending: ORTHOPAEDIC SURGERY
Payer: MEDICARE

## 2021-12-08 DIAGNOSIS — M62.81 MUSCLE WEAKNESS OF LEFT UPPER EXTREMITY: ICD-10-CM

## 2021-12-08 DIAGNOSIS — M25.612 DECREASED RANGE OF MOTION OF LEFT SHOULDER: ICD-10-CM

## 2021-12-08 PROCEDURE — 97112 NEUROMUSCULAR REEDUCATION: CPT | Mod: PN

## 2021-12-08 PROCEDURE — 97110 THERAPEUTIC EXERCISES: CPT | Mod: PN

## 2021-12-14 ENCOUNTER — CLINICAL SUPPORT (OUTPATIENT)
Dept: REHABILITATION | Facility: HOSPITAL | Age: 66
End: 2021-12-14
Attending: ORTHOPAEDIC SURGERY
Payer: MEDICARE

## 2021-12-14 DIAGNOSIS — M62.81 MUSCLE WEAKNESS OF LEFT UPPER EXTREMITY: ICD-10-CM

## 2021-12-14 DIAGNOSIS — M25.612 DECREASED RANGE OF MOTION OF LEFT SHOULDER: ICD-10-CM

## 2021-12-14 PROCEDURE — 97140 MANUAL THERAPY 1/> REGIONS: CPT | Mod: KX,PN

## 2021-12-14 PROCEDURE — 97112 NEUROMUSCULAR REEDUCATION: CPT | Mod: KX,PN

## 2021-12-14 PROCEDURE — 97110 THERAPEUTIC EXERCISES: CPT | Mod: KX,PN

## 2021-12-16 ENCOUNTER — CLINICAL SUPPORT (OUTPATIENT)
Dept: REHABILITATION | Facility: HOSPITAL | Age: 66
End: 2021-12-16
Attending: ORTHOPAEDIC SURGERY
Payer: MEDICARE

## 2021-12-16 DIAGNOSIS — M25.612 DECREASED RANGE OF MOTION OF LEFT SHOULDER: ICD-10-CM

## 2021-12-16 DIAGNOSIS — M62.81 MUSCLE WEAKNESS OF LEFT UPPER EXTREMITY: ICD-10-CM

## 2021-12-16 PROCEDURE — 97140 MANUAL THERAPY 1/> REGIONS: CPT | Mod: KX,PN

## 2021-12-16 PROCEDURE — 97110 THERAPEUTIC EXERCISES: CPT | Mod: KX,PN

## 2021-12-29 ENCOUNTER — CLINICAL SUPPORT (OUTPATIENT)
Dept: REHABILITATION | Facility: HOSPITAL | Age: 66
End: 2021-12-29
Attending: ORTHOPAEDIC SURGERY
Payer: MEDICARE

## 2021-12-29 DIAGNOSIS — M62.81 MUSCLE WEAKNESS OF LEFT UPPER EXTREMITY: ICD-10-CM

## 2021-12-29 DIAGNOSIS — M25.612 DECREASED RANGE OF MOTION OF LEFT SHOULDER: ICD-10-CM

## 2021-12-29 PROCEDURE — 97112 NEUROMUSCULAR REEDUCATION: CPT | Mod: KX,PN

## 2021-12-29 PROCEDURE — 97140 MANUAL THERAPY 1/> REGIONS: CPT | Mod: KX,PN

## 2021-12-29 PROCEDURE — 97110 THERAPEUTIC EXERCISES: CPT | Mod: KX,PN

## 2022-01-07 ENCOUNTER — CLINICAL SUPPORT (OUTPATIENT)
Dept: REHABILITATION | Facility: HOSPITAL | Age: 67
End: 2022-01-07
Attending: ORTHOPAEDIC SURGERY
Payer: MEDICARE

## 2022-01-07 ENCOUNTER — OFFICE VISIT (OUTPATIENT)
Dept: ORTHOPEDICS | Facility: CLINIC | Age: 67
End: 2022-01-07
Payer: MEDICARE

## 2022-01-07 VITALS — WEIGHT: 170 LBS | BODY MASS INDEX: 25.18 KG/M2 | HEIGHT: 69 IN

## 2022-01-07 DIAGNOSIS — M25.612 DECREASED RANGE OF MOTION OF LEFT SHOULDER: ICD-10-CM

## 2022-01-07 DIAGNOSIS — M62.81 MUSCLE WEAKNESS OF LEFT UPPER EXTREMITY: ICD-10-CM

## 2022-01-07 DIAGNOSIS — M75.122 NONTRAUMATIC COMPLETE TEAR OF LEFT ROTATOR CUFF: Primary | ICD-10-CM

## 2022-01-07 PROCEDURE — 99212 OFFICE O/P EST SF 10 MIN: CPT | Mod: PBBFAC,PN | Performed by: ORTHOPAEDIC SURGERY

## 2022-01-07 PROCEDURE — 99212 OFFICE O/P EST SF 10 MIN: CPT | Mod: S$PBB,,, | Performed by: ORTHOPAEDIC SURGERY

## 2022-01-07 PROCEDURE — 97110 THERAPEUTIC EXERCISES: CPT | Mod: PN

## 2022-01-07 PROCEDURE — 97112 NEUROMUSCULAR REEDUCATION: CPT | Mod: PN

## 2022-01-07 PROCEDURE — 99212 PR OFFICE/OUTPT VISIT, EST, LEVL II, 10-19 MIN: ICD-10-PCS | Mod: S$PBB,,, | Performed by: ORTHOPAEDIC SURGERY

## 2022-01-07 PROCEDURE — 99999 PR PBB SHADOW E&M-EST. PATIENT-LVL II: ICD-10-PCS | Mod: PBBFAC,,, | Performed by: ORTHOPAEDIC SURGERY

## 2022-01-07 PROCEDURE — 99999 PR PBB SHADOW E&M-EST. PATIENT-LVL II: CPT | Mod: PBBFAC,,, | Performed by: ORTHOPAEDIC SURGERY

## 2022-01-07 NOTE — PROGRESS NOTES
1/7/2022    History reviewed. No pertinent past medical history.    Past Surgical History:   Procedure Laterality Date    ARTHROSCOPIC ACROMIOPLASTY OF SHOULDER Left 9/23/2021    Procedure: ACROMIOPLASTY, ARTHROSCOPIC;  Surgeon: Fadi Craven MD;  Location: [facility];  Service: Orthopedics;  Laterality: Left;    ROTATOR CUFF REPAIR Left 9/23/2021    Procedure: REPAIR, ROTATOR CUFF;  Surgeon: Fadi Craven MD;  Location: [facility] OR;  Service: Orthopedics;  Laterality: Left;    SHOULDER ARTHROSCOPY Left 9/23/2021    Procedure: ARTHROSCOPY, SHOULDER;  Surgeon: Fadi Craven MD;  Location: [facility] OR;  Service: Orthopedics;  Laterality: Left;  BEACH CHAIR, ANTONIO! , SHOULDER BOOM([facility])       Current Outpatient Medications   Medication Sig    acetaminophen (TYLENOL) 325 MG tablet Take 325 mg by mouth every 6 (six) hours as needed for Pain.    ondansetron (ZOFRAN) 4 MG tablet Take 1 tablet (4 mg total) by mouth every 6 (six) hours as needed for Nausea.    oxyCODONE-acetaminophen (PERCOCET)  mg per tablet Take 1 tablet by mouth every 4 (four) hours as needed for Pain.     No current facility-administered medications for this visit.       Review of patient's allergies indicates:  No Known Allergies    History reviewed. No pertinent family history.    Social History     Socioeconomic History    Marital status:    Tobacco Use    Smoking status: Never Smoker    Smokeless tobacco: Never Used       Chief Complaint:   Chief Complaint   Patient presents with    Left Shoulder - Pain, Follow-up     14 weeks s/p Left Mini Open RCR on 09/23/21. LV on 11/19/2021 --cont. PT. His pain is minimal, with the exception of certain movements.          History of present illness:    This is a 66 y.o. year old male who complains of the patient is 3 and a half months status post mini open cuff he continues to go to physical therapy patient reports minimal discomfort only with certain activities patient is  "not having any night pain    Review of Systems:    Constitution: Denies chills, fever, and sweats.  HENT: Denies headaches or blurry vision.  Cardiovascular: Denies chest pain or irregular heart beat.  Respiratory: Denies cough or shortness of breath.  Gastrointestinal: Denies abdominal pain, nausea, or vomiting.  Musculoskeletal:  Denies muscle cramps.  Neurological: Denies dizziness or focal weakness.  Psychiatric/Behavioral: Normal mental status.  Hematologic/Lymphatic: Denies bleeding problem or easy bruising/bleeding.  Skin: Denies rash or suspicious lesions.    Examination:    Vital Signs:    Vitals:    01/07/22 0858   Weight: 77.1 kg (170 lb)   Height: 5' 9" (1.753 m)   PainSc:   1   PainLoc: Shoulder       Body mass index is 25.1 kg/m².    This a well-developed, well nourished patient in no acute distress.    Alert and oriented x 3 and cooperative to examination.       Physical Exam:  Left shoulder-his incision is healing well patient has excellent range of motion of the shoulder there is no crepitance he states he has some pain with abduction not in the anterior part of his shoulder but in the posterior aspect of the shoulder he has a negative impingement test has no pain over the acromioclavicular joint    Imaging:  No x-rays       Assessment: Nontraumatic complete tear of left rotator cuff        Plan:  Patient is going to continue his physical therapy to increase his strength and range of motion we will see him back in 6 weeks      DISCLAIMER: This note may have been dictated using voice recognition software and may contain grammatical errors.     NOTE: Consult report sent to referring provider via EPIC EMR.    "

## 2022-01-07 NOTE — PROGRESS NOTES
Physical Therapy Daily Treatment Note     Name: Rusty Sen II  Clinic Number: [id number]    Therapy Diagnosis:   Encounter Diagnoses   Name Primary?    Decreased range of motion of left shoulder     Muscle weakness of left upper extremity      Physician: Fadi Craven MD    Visit Date: 1/7/2022  Physician Orders: PT Eval and Treat   Medical Diagnosis from Referral: M75.122 (ICD-10-CM) - Nontraumatic complete tear of left rotator cuff  Evaluation Date: 9/30/2021  Authorization Period Expiration: 09/28/2022  Plan of Care Expiration: 03/30/2022  Visit # / Visits authorized: (1/12 auth) 20 total   Date of Surgery: 09/23/2021  Return to MD date: 11/19/2021    First FOTO: 11/29/2021  Follow up FOTO:     Time In: 0749  Time Out: 0830  Total Billable Time: 40 minutes      Precautions: Standard    Subjective   Pt reports: doing well continues to have a little discomfort at end range shoulder flexion.     He was compliant with home exercise program.  Response to previous treatment: positive   Functional change: None     Pain: 0/10  Location: left shoulder      Objective   L shoulder active Range of motion on 01/07/2022:      Flexion: 135   External rotation at side: 70  Functional Internal rotation: L2    Rusty received therapeutic exercises to develop strength, endurance, ROM and posture for 24 minutes including:     Seated thoracic extension x20 with 3s hold   Standing Internal rotation with BTB 3x12   Standing External rotation with BTB 3x12   Prone T with scap set 3x12 with 2# db  Prone Y with scap set 3x12 with 2# db  Upper trap level III with 2#db 3x12      Not today:   Upper Body Ergometer 3mins fwd/3mins bwd 2.8 resistance   Sidelying L shoulder flexion with dowel 3x12   Sidelying External rotation 2# 3x12  Prone shoulder extension 2# with scap set 3x12 with 3s hold   Prone row 3x10 3# with 3s hold    Wall slides with foam roll 2x10    Rusty received the following manual therapy techniques: PROM were  applied to the: L shoulder, scapula, and elbow for  8 minutes, including:    Passive range of motion in flexion/scaption/IR/ER  Inferior glides grade II-III  Posterior glides grade II-III      Not today:   Thoracic PA s grade III     Rusty received the following neuromuscular re-education to improve muscle firing patterns for 8 minutes, including:    Rhythmic stabilization 3x30s with arm at 90 deg flexion in supine, 3x30s with arm at 120 deg flexion in R sidelying   Serratus landmines with 13#dowel 3x12 hand held lower on dowel       Not today:   Low trap isometric x20 with 5s hold    Touchdowns level III 3x12 with RTB   Internal rotation with RTB, 3x12   External rotation with RTB 3x12     Home Exercises Provided and Patient Education Provided     Education provided:   - Provided updated HEP    Written Home Exercises Provided: yes.  Exercises were reviewed and Rusty was able to demonstrate them prior to the end of the session.  Rusty demonstrated good  understanding of the education provided.     See EMR under Patient Instructions for exercises provided prior visit.    Assessment   Rusty is progressing very well, he continues with some L shoulder pain at end range flexion. He continues with limited L shoulder range of motion and strength deficits therefore joint mobilization and exercise was prescribed. We are targeting his rotator cuff and scapular upward rotators with strength training to improve overhead flexion. Will continue to progress as able.     Rusty is progressing well towards his goals.   Pt prognosis is Excellent.     Pt will continue to benefit from skilled outpatient physical therapy to address the deficits listed in the problem list box on initial evaluation, provide pt/family education and to maximize pt's level of independence in the home and community environment.     Pt's spiritual, cultural and educational needs considered and pt agreeable to plan of care and goals.    Anticipated barriers to  physical therapy: none at this time     Goals:  Short Term Goals: 5 weeks progressing   1. Pt will be IND with initial HEP to manage symptoms outside of PT. MET  2. Pt will report L shoulder pain </= 0/10 with AROM to demonstrate improved condition. progressing  3. Pt will improve MMT of Left upper extremity  to >/= 3/5 to improve tolerance for reaching overhead. progressing  4. Pt will improve L shoulder ROM by >/= 10 degrees to improve tolerance for overhead reaching. MET      Long Term Goals: 24 weeks progressing   1. Pt will improve FOTO score to </= 26% limited to demonstrate improved functional mobility.  2. Pt will be IND with final HEP to maintain/improve strength and mobility gained in PT.   3. Pt will report L shoulder pain </= 0/10 with lifting/carrying activities to demonstrate improved condition.   4. Pt will improve MMT of LUE to >/= 4+/ 5 to improve tolerance for recreation/leisure activities.   5. Pt will improve L shoulder ROM = to uninvolved side to improve tolerance for return to weight lifting.   6. Pt goal: Pt will report returning to running and weight training in gym without increase in L shoulder pain.    Plan     Updated Certification Period: 1/7/2022 to 03/30/2022    Other Recommendations: Continue plan of care with focus on improving rotator cuff     Juan Cervantes, PT  1/7/2022                   Yes-Patient/Caregiver accepts free interpretation services...

## 2022-01-14 ENCOUNTER — CLINICAL SUPPORT (OUTPATIENT)
Dept: REHABILITATION | Facility: HOSPITAL | Age: 67
End: 2022-01-14
Attending: ORTHOPAEDIC SURGERY
Payer: MEDICARE

## 2022-01-14 DIAGNOSIS — M25.612 DECREASED RANGE OF MOTION OF LEFT SHOULDER: ICD-10-CM

## 2022-01-14 DIAGNOSIS — M62.81 MUSCLE WEAKNESS OF LEFT UPPER EXTREMITY: ICD-10-CM

## 2022-01-14 PROCEDURE — 97140 MANUAL THERAPY 1/> REGIONS: CPT | Mod: PN

## 2022-01-14 PROCEDURE — 97112 NEUROMUSCULAR REEDUCATION: CPT | Mod: PN

## 2022-01-14 PROCEDURE — 97110 THERAPEUTIC EXERCISES: CPT | Mod: PN

## 2022-01-14 NOTE — PROGRESS NOTES
Physical Therapy Daily Treatment Note/Re-Assessment     Name: Rusty Sen II  Clinic Number: [id number]    Therapy Diagnosis:   Encounter Diagnoses   Name Primary?    Decreased range of motion of left shoulder     Muscle weakness of left upper extremity      Physician: Fadi Craven MD    Visit Date: 1/14/2022  Physician Orders: PT Eval and Treat   Medical Diagnosis from Referral: M75.122 (ICD-10-CM) - Nontraumatic complete tear of left rotator cuff  Evaluation Date: 9/30/2021  Authorization Period Expiration: 09/28/2022  Plan of Care Expiration: 03/30/2022  Visit # / Visits authorized: (2/12 auth) 21 total   Date of Surgery: 09/23/2021  Return to MD date: 02/11/2021     First FOTO: 11/29/2021  Follow up FOTO:     Time In: 0702  Time Out: 0757  Total Billable Time: 53 minutes      Precautions: Standard    Subjective   Pt reports: had his follow up appt with Dr. Craven and he said everything is looking good.     He was compliant with home exercise program.  Response to previous treatment: positive   Functional change: None     Pain: 0/10  Location: left shoulder      Objective   L shoulder active Range of motion on 01/14/2022:      Flexion: 135   External rotation at side: 70  Functional Internal rotation: L2    Rusty received therapeutic exercises to develop strength, endurance, ROM and posture for 33 minutes including:     Seated thoracic extension x20 with 3s hold   Standing Internal rotation with BTB 3x12   Standing External rotation with BTB 3x12   Supine shoulder flexion 3x12 with 2#db   Prone T with scap set 3x12 with 2# db  Prone Y with scap set 3x12 with 2# db  Serratus wall slides with lift off RTB 3x12   Seated lat pulls with BTB 3x12     Not today:   Upper trap level III with 2#db 3x12  Upper Body Ergometer 3mins fwd/3mins bwd 2.8 resistance   Sidelying L shoulder flexion with dowel 3x12   Sidelying External rotation 2# 3x12  Prone shoulder extension 2# with scap set 3x12 with 3s hold   Prone  row 3x10 3# with 3s hold    Rusty received the following manual therapy techniques: PROM were applied to the: L shoulder, scapula, and elbow for  8 minutes, including:    Passive range of motion in flexion/scaption/IR/ER  Inferior glides grade II-III  Posterior glides grade II-III      Not today:   Thoracic PA s grade III     Rusty received the following neuromuscular re-education to improve muscle firing patterns for 12 minutes, including:    Rhythmic stabilization 3x30s with arm at 120 deg flexion in supine  Serratus landmines with 15#dowel 3x12 hand held lower on dowel   Bodyblade 3x30s  External rotation/IR   Bodyblade 3x30s palm down at 90 deg     Not today:   Low trap isometric x20 with 5s hold    Touchdowns level III 3x12 with RTB   Internal rotation with RTB, 3x12   External rotation with RTB 3x12     Home Exercises Provided and Patient Education Provided     Education provided:   - Continue HEP with focus on strengthening of rotator cuff and scapular upward rotators     Written Home Exercises Provided: yes.  Exercises were reviewed and Rusty was able to demonstrate them prior to the end of the session.  Rusty demonstrated good  understanding of the education provided.     See EMR under Patient Instructions for exercises provided prior visit.    Re-Assessment   Rusty is 16 weeks s/p L rotator cuff repair and has been seen for 3 visits over the past month. He is progressing very well with range of motion and strength. His deficits remaining are decreased L shoulder active range of motion with end range elevation and weakness of his rotator cuff and scapular upward rotators. His deficits are limiting his ability to perform overhead activities, lifting/carrying activities, and recreation/leisure activities. He has an excellent prognosis to progress towards and meet set goals.     Rusty is progressing well towards his goals.   Pt prognosis is Excellent.     Pt will continue to benefit from skilled outpatient  physical therapy to address the deficits listed in the problem list box on initial evaluation, provide pt/family education and to maximize pt's level of independence in the home and community environment.     Pt's spiritual, cultural and educational needs considered and pt agreeable to plan of care and goals.    Anticipated barriers to physical therapy: none at this time     Goals:  Short Term Goals: 5 weeks progressing   1. Pt will be IND with initial HEP to manage symptoms outside of PT. MET  2. Pt will report L shoulder pain </= 0/10 with AROM to demonstrate improved condition. progressing  3. Pt will improve MMT of Left upper extremity  to >/= 3/5 to improve tolerance for reaching overhead. progressing  4. Pt will improve L shoulder ROM by >/= 10 degrees to improve tolerance for overhead reaching. MET      Long Term Goals: 24 weeks progressing   1. Pt will improve FOTO score to </= 26% limited to demonstrate improved functional mobility.  2. Pt will be IND with final HEP to maintain/improve strength and mobility gained in PT.   3. Pt will report L shoulder pain </= 0/10 with lifting/carrying activities to demonstrate improved condition.   4. Pt will improve MMT of LUE to >/= 4+/ 5 to improve tolerance for recreation/leisure activities.   5. Pt will improve L shoulder ROM = to uninvolved side to improve tolerance for return to weight lifting.   6. Pt goal: Pt will report returning to running and weight training in gym without increase in L shoulder pain.    Plan     Updated Certification Period: 1/14/2022 to 03/30/2022    Other Recommendations: Continue plan of care with focus on improving strength of rotator cuff and scapular upward rotators.     Juan Cervantes, PT  1/14/2022

## 2022-01-21 ENCOUNTER — CLINICAL SUPPORT (OUTPATIENT)
Dept: REHABILITATION | Facility: HOSPITAL | Age: 67
End: 2022-01-21
Attending: ORTHOPAEDIC SURGERY
Payer: MEDICARE

## 2022-01-21 DIAGNOSIS — M25.612 DECREASED RANGE OF MOTION OF LEFT SHOULDER: ICD-10-CM

## 2022-01-21 DIAGNOSIS — M62.81 MUSCLE WEAKNESS OF LEFT UPPER EXTREMITY: ICD-10-CM

## 2022-01-21 PROCEDURE — 97110 THERAPEUTIC EXERCISES: CPT | Mod: PN

## 2022-01-21 PROCEDURE — 97112 NEUROMUSCULAR REEDUCATION: CPT | Mod: PN

## 2022-01-21 NOTE — PROGRESS NOTES
Physical Therapy Daily Treatment Note     Name: Rusty Sen II  Clinic Number: [id number]    Therapy Diagnosis:   Encounter Diagnoses   Name Primary?    Decreased range of motion of left shoulder     Muscle weakness of left upper extremity      Physician: Fadi Craven MD    Visit Date: 1/21/2022  Physician Orders: PT Eval and Treat   Medical Diagnosis from Referral: M75.122 (ICD-10-CM) - Nontraumatic complete tear of left rotator cuff  Evaluation Date: 9/30/2021  Authorization Period Expiration: 09/28/2022  Plan of Care Expiration: 03/30/2022  Visit # / Visits authorized: (3/12 auth) 21 total   Date of Surgery: 09/23/2021  Return to MD date: 02/11/2021     First FOTO: 11/29/2021  Follow up FOTO:     Time In: 0701  Time Out: 0758  Total Billable Time: 57 minutes      Precautions: Standard    Subjective   Pt reports: his L shoulder is getting better every day     He was compliant with home exercise program.  Response to previous treatment: positive   Functional change: None     Pain: 0/10  Location: left shoulder      Objective   L shoulder active Range of motion on 01/14/2022:      Flexion: 135   External rotation at side: 70  Functional Internal rotation: L2    Rusty received therapeutic exercises to develop strength, endurance, ROM and posture for 40 minutes including:     Seated thoracic extension x20 with 3s hold   Supine shoulder flexion 3x12 with 2#db   Sidelying Abd 3x12 with 2# db  Standing Internal rotation with BTB 3x12   Standing External rotation with BTB 3x12   Tricep pull down 3x12 Black TB   Bicep curls with 6#db 3x12   Resisted D2 flexion with RTB 3x10     Not today:  Prone T with scap set 3x12 with 2# db  Prone Y with scap set 3x12 with 2# db  Serratus wall slides with lift off RTB 3x12   Seated lat pulls with BTB 3x12   Upper trap level III with 2#db 3x12  Upper Body Ergometer 3mins fwd/3mins bwd 2.8 resistance   Sidelying L shoulder flexion with dowel 3x12   Sidelying External rotation  2# 3x12  Prone shoulder extension 2# with scap set 3x12 with 3s hold   Prone row 3x10 3# with 3s hold    Rusty received the following manual therapy techniques: PROM were applied to the: L shoulder, scapula, and elbow for  5 minutes, including:    Passive range of motion in flexion/scaption/IR/ER  Inferior glides grade II-III  Posterior glides grade II-III      Not today:   Thoracic PA s grade III     Rusty received the following neuromuscular re-education to improve muscle firing patterns for 12 minutes, including:      Serratus landmines with 15#dowel 3x12 hand held lower on dowel   Bodyblade 3x30s  External rotation/IR   Bodyblade 3x30s palm down at 90 deg     Not today:   Rhythmic stabilization 3x30s with arm at 120 deg flexion in supine  Low trap isometric x20 with 5s hold    Touchdowns level III 3x12 with RTB   Internal rotation with RTB, 3x12   External rotation with RTB 3x12     Home Exercises Provided and Patient Education Provided     Education provided:   - Continue HEP with focus on strengthening of rotator cuff and scapular upward rotators     Written Home Exercises Provided: yes.  Exercises were reviewed and Rusty was able to demonstrate them prior to the end of the session.  Rusty demonstrated good  understanding of the education provided.     See EMR under Patient Instructions for exercises provided prior visit.    Assessment   Rusty continues to tolerate treatment well. He was able to increase resistance training to begin working his way back into his previous gym routine today. Plan to re-assess following the next 3 visits scheduled to determine if he is ready for discharge.     Rusty is progressing well towards his goals.   Pt prognosis is Excellent.     Pt will continue to benefit from skilled outpatient physical therapy to address the deficits listed in the problem list box on initial evaluation, provide pt/family education and to maximize pt's level of independence in the home and community  environment.     Pt's spiritual, cultural and educational needs considered and pt agreeable to plan of care and goals.    Anticipated barriers to physical therapy: none at this time     Goals:  Short Term Goals: 5 weeks progressing   1. Pt will be IND with initial HEP to manage symptoms outside of PT. MET  2. Pt will report L shoulder pain </= 0/10 with AROM to demonstrate improved condition. progressing  3. Pt will improve MMT of Left upper extremity  to >/= 3/5 to improve tolerance for reaching overhead. progressing  4. Pt will improve L shoulder ROM by >/= 10 degrees to improve tolerance for overhead reaching. MET      Long Term Goals: 24 weeks progressing   1. Pt will improve FOTO score to </= 26% limited to demonstrate improved functional mobility.  2. Pt will be IND with final HEP to maintain/improve strength and mobility gained in PT.   3. Pt will report L shoulder pain </= 0/10 with lifting/carrying activities to demonstrate improved condition.   4. Pt will improve MMT of LUE to >/= 4+/ 5 to improve tolerance for recreation/leisure activities.   5. Pt will improve L shoulder ROM = to uninvolved side to improve tolerance for return to weight lifting.   6. Pt goal: Pt will report returning to running and weight training in gym without increase in L shoulder pain.    Plan     Updated Certification Period: 1/21/2022 to 03/30/2022    Other Recommendations: Continue plan of care with focus on improving strength of rotator cuff and scapular upward rotators.     Juan Cervantes, PT  1/21/2022

## 2022-01-28 ENCOUNTER — CLINICAL SUPPORT (OUTPATIENT)
Dept: REHABILITATION | Facility: HOSPITAL | Age: 67
End: 2022-01-28
Attending: ORTHOPAEDIC SURGERY
Payer: MEDICARE

## 2022-01-28 DIAGNOSIS — M62.81 MUSCLE WEAKNESS OF LEFT UPPER EXTREMITY: ICD-10-CM

## 2022-01-28 DIAGNOSIS — M25.612 DECREASED RANGE OF MOTION OF LEFT SHOULDER: ICD-10-CM

## 2022-01-28 PROCEDURE — 97112 NEUROMUSCULAR REEDUCATION: CPT | Mod: PN,CQ

## 2022-01-28 PROCEDURE — 97110 THERAPEUTIC EXERCISES: CPT | Mod: PN,CQ

## 2022-01-28 NOTE — PROGRESS NOTES
Physical Therapy Daily Treatment Note     Name: Rusty Sen II  Clinic Number: [id number]    Therapy Diagnosis:   Encounter Diagnoses   Name Primary?    Decreased range of motion of left shoulder     Muscle weakness of left upper extremity      Physician: Fadi Craven MD    Visit Date: 1/28/2022  Physician Orders: PT Eval and Treat   Medical Diagnosis from Referral: M75.122 (ICD-10-CM) - Nontraumatic complete tear of left rotator cuff  Evaluation Date: 9/30/2021  Authorization Period Expiration: 09/28/2022  Plan of Care Expiration: 03/30/2022  Visit # / Visits authorized: (4/12 auth) 21 total   Date of Surgery: 09/23/2021  Return to MD date: 02/11/2021     First FOTO: 11/29/2021  Follow up FOTO:     Time In: 0704  Time Out: 0800  Total Billable Time: 30 minutes      Precautions: Standard    Subjective   Pt reports: No pain this am. Reports he has been back in the gym doing a light routine for approx 2 weeks now without issues. Still has come discomfort with standing and reaching out into abduction.     He was compliant with home exercise program.  Response to previous treatment: positive   Functional change: None     Pain: 0/10  Location: left shoulder      Objective   L shoulder active Range of motion on 01/14/2022:      Flexion: 135   External rotation at side: 70  Functional Internal rotation: L2    Rusty received therapeutic exercises to develop strength, endurance, ROM and posture for 40 minutes including:     Seated thoracic extension x20 with 3s hold   Supine shoulder flexion 3x12 with 2#db   Sidelying Abd 3x12 with 2# db  Standing Internal rotation with BTB 3x12   Standing External rotation with BTB 3x12   Tricep pull down 3x12 Black TB   Bicep curls with 6#db 3x12   Resisted D2 flexion with RTB 3x10     Not today:  Prone T with scap set 3x12 with 2# db  Prone Y with scap set 3x12 with 2# db  Serratus wall slides with lift off RTB 3x12   Seated lat pulls with BTB 3x12   Upper trap level III with  2#db 3x12  Upper Body Ergometer 3mins fwd/3mins bwd 2.8 resistance   Sidelying L shoulder flexion with dowel 3x12   Sidelying External rotation 2# 3x12  Prone shoulder extension 2# with scap set 3x12 with 3s hold   Prone row 3x10 3# with 3s hold    Rusty received the following manual therapy techniques: PROM were applied to the: L shoulder, scapula, and elbow for  5 minutes, including:    Passive range of motion in flexion/scaption/IR/ER  Inferior glides grade II-III  Posterior glides grade II-III      Not today:   Thoracic PA s grade III     Rusty received the following neuromuscular re-education to improve muscle firing patterns for 12 minutes, including:      Serratus landmines with 15#dowel 3x12 hand held lower on dowel   Bodyblade 3x30s  External rotation/IR   Bodyblade 3x30s palm down at 90 deg   +Lateral ball circles CW and CCW 3 x 30 sec each     Not today:   Rhythmic stabilization 3x30s with arm at 120 deg flexion in supine  Low trap isometric x20 with 5s hold    Touchdowns level III 3x12 with RTB   Internal rotation with RTB, 3x12   External rotation with RTB 3x12     Home Exercises Provided and Patient Education Provided     Education provided:   - Continue HEP with focus on strengthening of rotator cuff and scapular upward rotators     Written Home Exercises Provided: yes.  Exercises were reviewed and Rusty was able to demonstrate them prior to the end of the session.  Rusty demonstrated good  understanding of the education provided.     See EMR under Patient Instructions for exercises provided prior visit.    Assessment     Due to subjective reporting, incorporated lateral shoulder stability there-ex to improve symptomology with outstretched horizontal abd. Pt req'd cueing to improve form and execution as expected and to improve eccentric control with there-ex. He responded well to all therapist instruction with improvement noted. Pt will continue to benefit from skilled PT to improve continued shoulder  deficits to allow him to return to PLOF without pain or limitations.      Rusty is progressing well towards his goals.   Pt prognosis is Excellent.     Pt will continue to benefit from skilled outpatient physical therapy to address the deficits listed in the problem list box on initial evaluation, provide pt/family education and to maximize pt's level of independence in the home and community environment.     Pt's spiritual, cultural and educational needs considered and pt agreeable to plan of care and goals.    Anticipated barriers to physical therapy: none at this time     Goals:  Short Term Goals: 5 weeks progressing   1. Pt will be IND with initial HEP to manage symptoms outside of PT. MET  2. Pt will report L shoulder pain </= 0/10 with AROM to demonstrate improved condition. progressing  3. Pt will improve MMT of Left upper extremity  to >/= 3/5 to improve tolerance for reaching overhead. progressing  4. Pt will improve L shoulder ROM by >/= 10 degrees to improve tolerance for overhead reaching. MET      Long Term Goals: 24 weeks progressing   1. Pt will improve FOTO score to </= 26% limited to demonstrate improved functional mobility.  2. Pt will be IND with final HEP to maintain/improve strength and mobility gained in PT.   3. Pt will report L shoulder pain </= 0/10 with lifting/carrying activities to demonstrate improved condition.   4. Pt will improve MMT of LUE to >/= 4+/ 5 to improve tolerance for recreation/leisure activities.   5. Pt will improve L shoulder ROM = to uninvolved side to improve tolerance for return to weight lifting.   6. Pt goal: Pt will report returning to running and weight training in gym without increase in L shoulder pain.    Plan     Updated Certification Period: 1/28/2022 to 03/30/2022    Other Recommendations: Continue plan of care with focus on improving strength of rotator cuff and scapular upward rotators.     Brenda Garcia, PTA  1/28/2022

## 2022-02-04 ENCOUNTER — CLINICAL SUPPORT (OUTPATIENT)
Dept: REHABILITATION | Facility: HOSPITAL | Age: 67
End: 2022-02-04
Attending: ORTHOPAEDIC SURGERY
Payer: MEDICARE

## 2022-02-04 DIAGNOSIS — M62.81 MUSCLE WEAKNESS OF LEFT UPPER EXTREMITY: ICD-10-CM

## 2022-02-04 DIAGNOSIS — M25.612 DECREASED RANGE OF MOTION OF LEFT SHOULDER: ICD-10-CM

## 2022-02-04 PROCEDURE — 97110 THERAPEUTIC EXERCISES: CPT | Mod: PN

## 2022-02-04 PROCEDURE — 97112 NEUROMUSCULAR REEDUCATION: CPT | Mod: PN

## 2022-02-04 NOTE — PROGRESS NOTES
Physical Therapy Daily Treatment Note/Re-Assessment      Name: Rusty Sen II  Clinic Number: [id number]    Therapy Diagnosis:   Encounter Diagnoses   Name Primary?    Decreased range of motion of left shoulder     Muscle weakness of left upper extremity      Physician: Fadi Craven MD    Visit Date: 2/4/2022  Physician Orders: PT Eval and Treat   Medical Diagnosis from Referral: M75.122 (ICD-10-CM) - Nontraumatic complete tear of left rotator cuff  Evaluation Date: 9/30/2021  Authorization Period Expiration: 09/28/2022  Plan of Care Expiration: 03/30/2022  Visit # / Visits authorized: (5/12 auth) 23 total   Date of Surgery: 09/23/2021  Return to MD date: 02/18/2021     First FOTO: 11/29/2021  Follow up FOTO: 02/04/2022    Time In: 0704  Time Out: 0750  Total Billable Time: 40 minutes      Precautions: Standard    Subjective   Pt reports: he has started going back to the gym performing certain exercises. He continues with a painful arc during abduction and difficulty with overhead activities such as reaching up into the top of his freezer or retrieving something from a top shelf.     He was compliant with home exercise program.  Response to previous treatment: positive   Functional change: None     Pain: 0/10  Location: left shoulder      Objective   L shoulder active Range of motion on 02/04/2022:      Flexion: 150  External rotation at side: 70  Functional Internal rotation: L2     Right Left Comment   Shoulder flexion: 5/5 4+/5    Shoulder Abduction: 5/5 4/5    Shoulder ER: 5/5 4+/5    Shoulder IR: 5/5 5/5    Middle trap 4/5 4-/5    Lower trap  4/5 4/5      CMS Impairment/Limitation/Restriction for FOTO Shoulder Survey  Status Limitation G-Code CMS Severity Modifier  Intake 41% 59%  Predicted 74% 26% Goal Status+ CJ - At least 20 percent but less than 40 percent  11/29/2021 57% 43%  2/4/2022 61% 39% Current Status CJ - At least 20 percent but less than 40 percent    Rusty received therapeutic  exercises to develop strength, endurance, ROM and posture for 15 minutes including:     Assessment as above   Sidelying abduction 3x15   Sidelying External rotation 3xmuscle fatigue with 2#db     Not today:   Seated thoracic extension x20 with 3s hold   Supine shoulder flexion 3x12 with 2#db   Sidelying Abd 3x12 with 2# db  Standing Internal rotation with BTB 3x12   Standing External rotation with BTB 3x12   Tricep pull down 3x12 Black TB   Bicep curls with 6#db 3x12   Resisted D2 flexion with RTB 3x10 :  Prone T with scap set 3x12 with 2# db  Prone Y with scap set 3x12 with 2# db  Serratus wall slides with lift off RTB 3x12   Seated lat pulls with BTB 3x12   Upper trap level III with 2#db 3x12  Upper Body Ergometer 3mins fwd/3mins bwd 2.8 resistance   Sidelying L shoulder flexion with dowel 3x12   Sidelying External rotation 2# 3x12  Prone shoulder extension 2# with scap set 3x12 with 3s hold   Prone row 3x10 3# with 3s hold    Rusty received the following manual therapy techniques: PROM were applied to the: L shoulder, scapula, and elbow for  2 minutes, including:      Inferior glides grade III       Not today:   Passive range of motion in flexion/scaption/IR/ER  Posterior glides grade II-III    Thoracic PA s grade III     Rusty received the following neuromuscular re-education to improve muscle firing patterns for 23 minutes, including:    Bodyblade 3x30s  External rotation/IR   Bodyblade 3x30s palm down below 90 deg   Serratus landmines with 15#dowel 3x12 hand held lower on dowel   Lateral ball circles CW and CCW 3 x 30 each     Not today:   Rhythmic stabilization 3x30s with arm at 120 deg flexion in supine  Low trap isometric x20 with 5s hold    Touchdowns level III 3x12 with RTB   Internal rotation with RTB, 3x12   External rotation with RTB 3x12     Home Exercises Provided and Patient Education Provided     Education provided:   - Provided updated HEP with focus on strengthening of rotator cuff and scapular  upward rotators     Written Home Exercises Provided: yes.  Exercises were reviewed and Rusty was able to demonstrate them prior to the end of the session.  Rusty demonstrated good  understanding of the education provided.     See EMR under Patient Instructions for exercises provided prior visit.    Assessment   Rusty has been seen for 3 visits over the past 3 weeks and demonstrates improvement in L shoulder Active range of motion. He continues with a painful arc in abduction as well as Left upper extremity weakness. Pt is progressing very well and has an excellent prognosis to progress towards and met remaining goals.       Rusty is progressing well towards his goals.   Pt prognosis is Excellent.     Pt will continue to benefit from skilled outpatient physical therapy to address the deficits listed in the problem list box on initial evaluation, provide pt/family education and to maximize pt's level of independence in the home and community environment.     Pt's spiritual, cultural and educational needs considered and pt agreeable to plan of care and goals.    Anticipated barriers to physical therapy: none at this time     Goals:  Short Term Goals: 5 weeks progressing   1. Pt will be IND with initial HEP to manage symptoms outside of PT. MET  2. Pt will report L shoulder pain </= 0/10 with AROM to demonstrate improved condition. MET  3. Pt will improve MMT of Left upper extremity  to >/= 3/5 to improve tolerance for reaching overhead. MET  4. Pt will improve L shoulder ROM by >/= 10 degrees to improve tolerance for overhead reaching. MET      Long Term Goals: 24 weeks progressing   1. Pt will improve FOTO score to </= 26% limited to demonstrate improved functional mobility.  2. Pt will be IND with final HEP to maintain/improve strength and mobility gained in PT.   3. Pt will report L shoulder pain </= 0/10 with lifting/carrying activities to demonstrate improved condition.   4. Pt will improve MMT of LUE to >/= 4+/ 5  to improve tolerance for recreation/leisure activities.   5. Pt will improve L shoulder ROM = to uninvolved side to improve tolerance for return to weight lifting.   6. Pt goal: Pt will report returning to running and weight training in gym without increase in L shoulder pain.    Plan     Updated Certification Period: 2/4/2022 to 03/30/2022    Other Recommendations: Continue plan of care with focus on improving strength of rotator cuff and scapular upward rotators.     Juan Cervantes, PT  2/4/2022

## 2022-02-07 ENCOUNTER — DOCUMENTATION ONLY (OUTPATIENT)
Dept: REHABILITATION | Facility: HOSPITAL | Age: 67
End: 2022-02-07
Payer: MEDICARE

## 2022-02-11 ENCOUNTER — CLINICAL SUPPORT (OUTPATIENT)
Dept: REHABILITATION | Facility: HOSPITAL | Age: 67
End: 2022-02-11
Attending: ORTHOPAEDIC SURGERY
Payer: MEDICARE

## 2022-02-11 DIAGNOSIS — M25.612 DECREASED RANGE OF MOTION OF LEFT SHOULDER: ICD-10-CM

## 2022-02-11 DIAGNOSIS — M62.81 MUSCLE WEAKNESS OF LEFT UPPER EXTREMITY: ICD-10-CM

## 2022-02-11 PROCEDURE — 97112 NEUROMUSCULAR REEDUCATION: CPT | Mod: PN

## 2022-02-11 PROCEDURE — 97110 THERAPEUTIC EXERCISES: CPT | Mod: PN

## 2022-02-11 NOTE — PROGRESS NOTES
Physical Therapy Daily Treatment Note     Name: Rusty Sen II  Clinic Number: [id number]    Therapy Diagnosis:   No diagnosis found.  Physician: Fadi Craven MD    Visit Date: 2/11/2022  Physician Orders: PT Eval and Treat   Medical Diagnosis from Referral: M75.122 (ICD-10-CM) - Nontraumatic complete tear of left rotator cuff  Evaluation Date: 9/30/2021  Authorization Period Expiration: 09/28/2022  Plan of Care Expiration: 03/30/2022  Visit # / Visits authorized: (5/12 auth) 23 total   Date of Surgery: 09/23/2021  Return to MD date: 02/18/2021     First FOTO: 11/29/2021  Follow up FOTO: 02/04/2022    Time In: 0707  Time Out: 0747  Total Billable Time: 40 minutes      Precautions: Standard    Subjective   Pt reports: continues with painful abduction and weakness overhead like if he is trying to put something on a top shelf.     He was compliant with home exercise program.  Response to previous treatment: positive   Functional change: None     Pain: 0/10  Location: left shoulder      Objective     Rusty received therapeutic exercises to develop strength, endurance, ROM and posture for 17 minutes including:     Sidelying abduction 3x12 with 1#db   Sidelying External rotation 3xmuscle fatigue with 3#db   Prone T with 2#db 3x12   Prone Y with 2#db 3x12     Not today:   Seated thoracic extension x20 with 3s hold   Supine shoulder flexion 3x12 with 2#db   Sidelying Abd 3x12 with 2# db  Standing Internal rotation with BTB 3x12   Standing External rotation with BTB 3x12   Tricep pull down 3x12 Black TB   Bicep curls with 6#db 3x12   Resisted D2 flexion with RTB 3x10 :  Prone T with scap set 3x12 with 2# db  Prone Y with scap set 3x12 with 2# db  Serratus wall slides with lift off RTB 3x12   Seated lat pulls with BTB 3x12   Upper trap level III with 2#db 3x12  Upper Body Ergometer 3mins fwd/3mins bwd 2.8 resistance   Sidelying L shoulder flexion with dowel 3x12   Sidelying External rotation 2# 3x12  Prone shoulder  extension 2# with scap set 3x12 with 3s hold   Prone row 3x10 3# with 3s hold    Rusty received the following manual therapy techniques: PROM were applied to the: L shoulder, scapula, and elbow for  0 minutes, including:      Inferior glides grade III       Not today:   Passive range of motion in flexion/scaption/IR/ER  Posterior glides grade II-III    Thoracic PA s grade III     Rusty received the following neuromuscular re-education to improve muscle firing patterns for 23 minutes, including:    Rhythmic stabilization overhead 4x45s   Ambulating with 2#db overhead for endurance/strength in that range   Serratus landmines with 12#dowel 3x12  Ball circles above shoulder level 3x30     Not today:   Bodyblade 3x30s  External rotation/IR   Bodyblade 3x30s palm down below 90 deg   Lateral ball circles CW and CCW 3 x 30 each   Rhythmic stabilization 3x30s with arm at 120 deg flexion in supine  Low trap isometric x20 with 5s hold    Touchdowns level III 3x12 with RTB   Internal rotation with RTB, 3x12   External rotation with RTB 3x12     Home Exercises Provided and Patient Education Provided     Education provided:   - Provided updated HEP with focus on strengthening of rotator cuff and scapular upward rotators     Written Home Exercises Provided: yes.  Exercises were reviewed and Rusty was able to demonstrate them prior to the end of the session.  Rusty demonstrated good  understanding of the education provided.     See EMR under Patient Instructions for exercises provided prior visit.    Assessment   Rusty tolerated treatment well with increased focus on overhead strength/endurance. He continues with a painful arc and difficulty lifting overhead. PT discussed plan of care with patient to schedule 1 visit 4 weeks out to re-assess and he is continue HEP at home during this time. Pt is in agreement.       Rusty is progressing well towards his goals.   Pt prognosis is Excellent.     Pt will continue to benefit from skilled  outpatient physical therapy to address the deficits listed in the problem list box on initial evaluation, provide pt/family education and to maximize pt's level of independence in the home and community environment.     Pt's spiritual, cultural and educational needs considered and pt agreeable to plan of care and goals.    Anticipated barriers to physical therapy: none at this time     Goals:  Short Term Goals: 5 weeks progressing   1. Pt will be IND with initial HEP to manage symptoms outside of PT. MET  2. Pt will report L shoulder pain </= 0/10 with AROM to demonstrate improved condition. MET  3. Pt will improve MMT of Left upper extremity  to >/= 3/5 to improve tolerance for reaching overhead. MET  4. Pt will improve L shoulder ROM by >/= 10 degrees to improve tolerance for overhead reaching. MET      Long Term Goals: 24 weeks progressing   1. Pt will improve FOTO score to </= 26% limited to demonstrate improved functional mobility.  2. Pt will be IND with final HEP to maintain/improve strength and mobility gained in PT.   3. Pt will report L shoulder pain </= 0/10 with lifting/carrying activities to demonstrate improved condition.   4. Pt will improve MMT of LUE to >/= 4+/ 5 to improve tolerance for recreation/leisure activities.   5. Pt will improve L shoulder ROM = to uninvolved side to improve tolerance for return to weight lifting.   6. Pt goal: Pt will report returning to running and weight training in gym without increase in L shoulder pain.    Plan     Updated Certification Period: 2/11/2022 to 03/30/2022    Other Recommendations: Continue plan of care with focus on improving strength of rotator cuff and scapular upward rotators.     Juan Cervantes, PT  2/11/2022

## 2022-02-23 ENCOUNTER — OFFICE VISIT (OUTPATIENT)
Dept: ORTHOPEDICS | Facility: CLINIC | Age: 67
End: 2022-02-23
Payer: MEDICARE

## 2022-02-23 VITALS — WEIGHT: 165 LBS | BODY MASS INDEX: 24.44 KG/M2 | HEIGHT: 69 IN

## 2022-02-23 DIAGNOSIS — M75.122 NONTRAUMATIC COMPLETE TEAR OF LEFT ROTATOR CUFF: Primary | ICD-10-CM

## 2022-02-23 PROCEDURE — 99212 OFFICE O/P EST SF 10 MIN: CPT | Mod: PBBFAC,PN | Performed by: ORTHOPAEDIC SURGERY

## 2022-02-23 PROCEDURE — 99999 PR PBB SHADOW E&M-EST. PATIENT-LVL II: ICD-10-PCS | Mod: PBBFAC,,, | Performed by: ORTHOPAEDIC SURGERY

## 2022-02-23 PROCEDURE — 99212 PR OFFICE/OUTPT VISIT, EST, LEVL II, 10-19 MIN: ICD-10-PCS | Mod: S$PBB,,, | Performed by: ORTHOPAEDIC SURGERY

## 2022-02-23 PROCEDURE — 99999 PR PBB SHADOW E&M-EST. PATIENT-LVL II: CPT | Mod: PBBFAC,,, | Performed by: ORTHOPAEDIC SURGERY

## 2022-02-23 PROCEDURE — 99212 OFFICE O/P EST SF 10 MIN: CPT | Mod: S$PBB,,, | Performed by: ORTHOPAEDIC SURGERY

## 2022-02-23 NOTE — PROGRESS NOTES
2/23/2022    No past medical history on file.    Past Surgical History:   Procedure Laterality Date    ARTHROSCOPIC ACROMIOPLASTY OF SHOULDER Left 9/23/2021    Procedure: ACROMIOPLASTY, ARTHROSCOPIC;  Surgeon: Fadi Craven MD;  Location: [facility];  Service: Orthopedics;  Laterality: Left;    ROTATOR CUFF REPAIR Left 9/23/2021    Procedure: REPAIR, ROTATOR CUFF;  Surgeon: Fadi Craven MD;  Location: [facility] OR;  Service: Orthopedics;  Laterality: Left;    SHOULDER ARTHROSCOPY Left 9/23/2021    Procedure: ARTHROSCOPY, SHOULDER;  Surgeon: Fadi Craven MD;  Location: [facility] OR;  Service: Orthopedics;  Laterality: Left;  BEACH CHAIR, ANTONIO! , SHOULDER BOOM([facility])       Current Outpatient Medications   Medication Sig    acetaminophen (TYLENOL) 325 MG tablet Take 325 mg by mouth every 6 (six) hours as needed for Pain.    ondansetron (ZOFRAN) 4 MG tablet Take 1 tablet (4 mg total) by mouth every 6 (six) hours as needed for Nausea.    oxyCODONE-acetaminophen (PERCOCET)  mg per tablet Take 1 tablet by mouth every 4 (four) hours as needed for Pain.     No current facility-administered medications for this visit.       Review of patient's allergies indicates:  No Known Allergies    No family history on file.    Social History     Socioeconomic History    Marital status:    Tobacco Use    Smoking status: Never Smoker    Smokeless tobacco: Never Used       Chief Complaint:   Chief Complaint   Patient presents with    Left Shoulder - Post-op Evaluation, Pain      DOS: 09/23/21--4mo 26d  s/p Left Mini Open RCR. Today PL: 1/10. Limited Rom         History of present illness:    This is a 66 y.o. year old male who complains of patient is now 5 months status post rotator cuff repair left shoulder pain level 1/10 complain motion    Review of Systems:    Constitution: Denies chills, fever, and sweats.  HENT: Denies headaches or blurry vision.  Cardiovascular: Denies chest pain or irregular  "heart beat.  Respiratory: Denies cough or shortness of breath.  Gastrointestinal: Denies abdominal pain, nausea, or vomiting.  Musculoskeletal:  Denies muscle cramps.  Neurological: Denies dizziness or focal weakness.  Psychiatric/Behavioral: Normal mental status.  Hematologic/Lymphatic: Denies bleeding problem or easy bruising/bleeding.  Skin: Denies rash or suspicious lesions.    Examination:    Vital Signs:    Vitals:    02/23/22 1603   Weight: 74.8 kg (165 lb)   Height: 5' 9" (1.753 m)   PainSc:   1   PainLoc: Shoulder       Body mass index is 24.37 kg/m².    This a well-developed, well nourished patient in no acute distress.    Alert and oriented x 3 and cooperative to examination.       Physical Exam:  Left shoulder-patient is incision is healed well he has excellent range of motion he has abduction to about 160° external rotation and 90° and has full internal rotation patient is getting increased strength in abduction has good external rotation internal rotation strength of his rotator cuff    Imaging:  No x-rays       Assessment: Nontraumatic complete tear of left rotator cuff        Plan:  Patient is can continue his physical therapy for 1 more week and then he is going to do on his own will check him back in about 2 months      DISCLAIMER: This note may have been dictated using voice recognition software and may contain grammatical errors.     NOTE: Consult report sent to referring provider via [facility] EMR.    "

## 2022-03-11 ENCOUNTER — CLINICAL SUPPORT (OUTPATIENT)
Dept: REHABILITATION | Facility: HOSPITAL | Age: 67
End: 2022-03-11
Attending: ORTHOPAEDIC SURGERY
Payer: MEDICARE

## 2022-03-11 DIAGNOSIS — M62.81 MUSCLE WEAKNESS OF LEFT UPPER EXTREMITY: ICD-10-CM

## 2022-03-11 DIAGNOSIS — M25.612 DECREASED RANGE OF MOTION OF LEFT SHOULDER: Primary | ICD-10-CM

## 2022-03-11 PROCEDURE — 97110 THERAPEUTIC EXERCISES: CPT | Mod: PN

## 2022-03-11 NOTE — PLAN OF CARE
Physical Therapy Daily Treatment Note/Discharge Summary     Name: Rusty Sen II  Clinic Number: [id number]    Therapy Diagnosis:   Encounter Diagnoses   Name Primary?    Decreased range of motion of left shoulder Yes    Muscle weakness of left upper extremity      Physician: Fadi Craven MD    Visit Date: 3/11/2022  Physician Orders: PT Eval and Treat   Medical Diagnosis from Referral: M75.122 (ICD-10-CM) - Nontraumatic complete tear of left rotator cuff  Evaluation Date: 9/30/2021  Authorization Period Expiration: 09/28/2022  Plan of Care Expiration: 03/30/2022  Visit # / Visits authorized: (7/12 auth) 26 total   Date of Surgery: 09/23/2021  Return to MD date: 02/18/2021     First FOTO: 11/29/2021  Follow up FOTO: 02/04/2022    Time In: 07002  Time Out: 0740  Total Billable Time: 38 minutes      Precautions: Standard    Subjective   Pt reports: he is doing really well, not quite 100% but he is getting there. He wants today to be about education on what exercises he should keep doing to maintain health of his rotator cuff and what exercises he should avoid in the gym.      He was compliant with home exercise program.  Response to previous treatment: positive   Functional change: improved ability to reach overhead     Pain: 0/10  Location: left shoulder      Objective   Flexion: 155 degrees  External rotation at side: 88 degrees  Functional Internal rotation: L1     Right Left Comment   Shoulder flexion: 5/5 5/5     Shoulder Abduction: 5/5 4+/5     Shoulder ER: 5/5 4+/5     Shoulder IR: 5/5 5/5     Middle trap 4/5 4/5     Lower trap  4/5 4/5         CMS Impairment/Limitation/Restriction for FOTO Shoulder Survey  Status Limitation G-Code CMS Severity Modifier  Intake 41% 59%  Predicted 74% 26% Goal Status+ CJ - At least 20 percent but less than 40 percent  11/29/2021 57% 43%  2/4/2022 61% 39%  3/11/2022 70% 30% Current Status CJ - At least 20 percent but less than 40 percent    Rusty received therapeutic  exercises to develop strength, endurance, ROM and posture for 38 minutes including:     Assessment as above   Upper Body Ergometer 3'fwd/3'bwd   External rotation with GTB 3x12   Internal rotation with GTB 3x12   Prone T with 2#db 3x12   Prone Y with 2#db 3x12   Serratus landmines 3x12 with 12#dowel       Rusty received the following manual therapy techniques: PROM were applied to the: L shoulder, scapula, and elbow for  0 minutes, including:      Inferior glides grade III       Not today:   Passive range of motion in flexion/scaption/IR/ER  Posterior glides grade II-III    Thoracic PA s grade III     Rusty received the following neuromuscular re-education to improve muscle firing patterns for 0 minutes, including:    Rhythmic stabilization overhead 4x45s   Ambulating with 2#db overhead for endurance/strength in that range   Serratus landmines with 12#dowel 3x12  Ball circles above shoulder level 3x30     Not today:   Bodyblade 3x30s  External rotation/IR   Bodyblade 3x30s palm down below 90 deg   Lateral ball circles CW and CCW 3 x 30 each   Rhythmic stabilization 3x30s with arm at 120 deg flexion in supine  Low trap isometric x20 with 5s hold    Touchdowns level III 3x12 with RTB   Internal rotation with RTB, 3x12   External rotation with RTB 3x12     Home Exercises Provided and Patient Education Provided     Education provided:   - Provided updated HEP with focus on strengthening of rotator cuff and scapular upward rotators     Written Home Exercises Provided: yes.  Exercises were reviewed and Rusty was able to demonstrate them prior to the end of the session.  Rusty demonstrated good  understanding of the education provided.     See EMR under Patient Instructions for exercises provided prior visit.    Assessment   Rusty tolerated final treatment well with increased focus on overhead strength/endurance. Pt demonstrates pain free active range of motion and improved L shoulder strength. PT provided updated HEP to  continue following discharge and educated him on being careful with lifting weights overhead.     Rusty is progressing well towards his goals.   Pt prognosis is Excellent.     Pt will continue to benefit from skilled outpatient physical therapy to address the deficits listed in the problem list box on initial evaluation, provide pt/family education and to maximize pt's level of independence in the home and community environment.     Pt's spiritual, cultural and educational needs considered and pt agreeable to plan of care and goals.    Anticipated barriers to physical therapy: none at this time     Goals:  Short Term Goals: 5 weeks progressing   1. Pt will be IND with initial HEP to manage symptoms outside of PT. MET  2. Pt will report L shoulder pain </= 0/10 with AROM to demonstrate improved condition. MET  3. Pt will improve MMT of Left upper extremity  to >/= 3/5 to improve tolerance for reaching overhead. MET  4. Pt will improve L shoulder ROM by >/= 10 degrees to improve tolerance for overhead reaching. MET      Long Term Goals: 24 weeks progressing   1. Pt will improve FOTO score to </= 26% limited to demonstrate improved functional mobility. Not MET (30%)  2. Pt will be IND with final HEP to maintain/improve strength and mobility gained in PT. MET  3. Pt will report L shoulder pain </= 0/10 with lifting/carrying activities to demonstrate improved condition. MET  4. Pt will improve MMT of LUE to >/= 4+/ 5 to improve tolerance for recreation/leisure activities. MET  5. Pt will improve L shoulder ROM = to uninvolved side to improve tolerance for return to weight lifting. Not met  6. Pt goal: Pt will report returning to running and weight training in gym without increase in L shoulder pain. MET    Discharge Summary     Date of Last visit: 03/11/2022  Total Visits Received: 26        Assessment    Goals: MET    Discharge reason: Patient has reached the maximum rehab potential for the present time, Patient has met  all of his goals     Plan   This patient is discharged from Physical Therapy and is to cont with their HEP and MD follow up PRN.      Juan Cervantes, PT  3/11/2022

## 2022-03-11 NOTE — PROGRESS NOTES
Physical Therapy Daily Treatment Note/Discharge Summary     Name: Rusty Sen II  Clinic Number: [id number]    Therapy Diagnosis:   Encounter Diagnoses   Name Primary?    Decreased range of motion of left shoulder Yes    Muscle weakness of left upper extremity      Physician: Fadi Craven MD    Visit Date: 3/11/2022  Physician Orders: PT Eval and Treat   Medical Diagnosis from Referral: M75.122 (ICD-10-CM) - Nontraumatic complete tear of left rotator cuff  Evaluation Date: 9/30/2021  Authorization Period Expiration: 09/28/2022  Plan of Care Expiration: 03/30/2022  Visit # / Visits authorized: (7/12 auth) 26 total   Date of Surgery: 09/23/2021  Return to MD date: 02/18/2021     First FOTO: 11/29/2021  Follow up FOTO: 02/04/2022    Time In: 07002  Time Out: 0740  Total Billable Time: 38 minutes      Precautions: Standard    Subjective   Pt reports: he is doing really well, not quite 100% but he is getting there. He wants today to be about education on what exercises he should keep doing to maintain health of his rotator cuff and what exercises he should avoid in the gym.      He was compliant with home exercise program.  Response to previous treatment: positive   Functional change: improved ability to reach overhead     Pain: 0/10  Location: left shoulder      Objective   Flexion: 155 degrees  External rotation at side: 88 degrees  Functional Internal rotation: L1     Right Left Comment   Shoulder flexion: 5/5 5/5     Shoulder Abduction: 5/5 4+/5     Shoulder ER: 5/5 4+/5     Shoulder IR: 5/5 5/5     Middle trap 4/5 4/5     Lower trap  4/5 4/5         CMS Impairment/Limitation/Restriction for FOTO Shoulder Survey  Status Limitation G-Code CMS Severity Modifier  Intake 41% 59%  Predicted 74% 26% Goal Status+ CJ - At least 20 percent but less than 40 percent  11/29/2021 57% 43%  2/4/2022 61% 39%  3/11/2022 70% 30% Current Status CJ - At least 20 percent but less than 40 percent    Rusty received therapeutic  exercises to develop strength, endurance, ROM and posture for 38 minutes including:     Assessment as above   Upper Body Ergometer 3'fwd/3'bwd   External rotation with GTB 3x12   Internal rotation with GTB 3x12   Prone T with 2#db 3x12   Prone Y with 2#db 3x12   Serratus landmines 3x12 with 12#dowel       Rusty received the following manual therapy techniques: PROM were applied to the: L shoulder, scapula, and elbow for  0 minutes, including:      Inferior glides grade III       Not today:   Passive range of motion in flexion/scaption/IR/ER  Posterior glides grade II-III    Thoracic PA s grade III     Rusty received the following neuromuscular re-education to improve muscle firing patterns for 0 minutes, including:    Rhythmic stabilization overhead 4x45s   Ambulating with 2#db overhead for endurance/strength in that range   Serratus landmines with 12#dowel 3x12  Ball circles above shoulder level 3x30     Not today:   Bodyblade 3x30s  External rotation/IR   Bodyblade 3x30s palm down below 90 deg   Lateral ball circles CW and CCW 3 x 30 each   Rhythmic stabilization 3x30s with arm at 120 deg flexion in supine  Low trap isometric x20 with 5s hold    Touchdowns level III 3x12 with RTB   Internal rotation with RTB, 3x12   External rotation with RTB 3x12     Home Exercises Provided and Patient Education Provided     Education provided:   - Provided updated HEP with focus on strengthening of rotator cuff and scapular upward rotators     Written Home Exercises Provided: yes.  Exercises were reviewed and Rusty was able to demonstrate them prior to the end of the session.  Rusty demonstrated good  understanding of the education provided.     See EMR under Patient Instructions for exercises provided prior visit.    Assessment   Rusty tolerated final treatment well with increased focus on overhead strength/endurance. Pt demonstrates pain free active range of motion and improved L shoulder strength. PT provided updated HEP to  continue following discharge and educated him on being careful with lifting weights overhead.     Rusty is progressing well towards his goals.   Pt prognosis is Excellent.     Pt will continue to benefit from skilled outpatient physical therapy to address the deficits listed in the problem list box on initial evaluation, provide pt/family education and to maximize pt's level of independence in the home and community environment.     Pt's spiritual, cultural and educational needs considered and pt agreeable to plan of care and goals.    Anticipated barriers to physical therapy: none at this time     Goals:  Short Term Goals: 5 weeks progressing   1. Pt will be IND with initial HEP to manage symptoms outside of PT. MET  2. Pt will report L shoulder pain </= 0/10 with AROM to demonstrate improved condition. MET  3. Pt will improve MMT of Left upper extremity  to >/= 3/5 to improve tolerance for reaching overhead. MET  4. Pt will improve L shoulder ROM by >/= 10 degrees to improve tolerance for overhead reaching. MET      Long Term Goals: 24 weeks progressing   1. Pt will improve FOTO score to </= 26% limited to demonstrate improved functional mobility. Not MET (30%)  2. Pt will be IND with final HEP to maintain/improve strength and mobility gained in PT. MET  3. Pt will report L shoulder pain </= 0/10 with lifting/carrying activities to demonstrate improved condition. MET  4. Pt will improve MMT of LUE to >/= 4+/ 5 to improve tolerance for recreation/leisure activities. MET  5. Pt will improve L shoulder ROM = to uninvolved side to improve tolerance for return to weight lifting. Not met  6. Pt goal: Pt will report returning to running and weight training in gym without increase in L shoulder pain. MET    Discharge Summary     Date of Last visit: 03/11/2022  Total Visits Received: 26        Assessment    Goals: MET    Discharge reason: Patient has reached the maximum rehab potential for the present time, Patient has met  all of his goals     Plan   This patient is discharged from Physical Therapy and is to cont with their HEP and MD follow up PRN.          Juan Cervantes, PT  3/11/2022

## 2022-03-30 ENCOUNTER — TELEPHONE (OUTPATIENT)
Dept: FAMILY MEDICINE | Facility: CLINIC | Age: 67
End: 2022-03-30
Payer: MEDICARE

## 2022-03-30 DIAGNOSIS — Z13.6 ENCOUNTER FOR SCREENING FOR CORONARY ARTERY DISEASE IN PATIENT WITH RISK FOR CORONARY ARTERY DISEASE LESS THAN 10% IN NEXT 10 YEARS: ICD-10-CM

## 2022-03-30 DIAGNOSIS — Z12.5 SPECIAL SCREENING FOR MALIGNANT NEOPLASM OF PROSTATE: Primary | ICD-10-CM

## 2022-03-30 DIAGNOSIS — Z91.89 ENCOUNTER FOR SCREENING FOR CORONARY ARTERY DISEASE IN PATIENT WITH RISK FOR CORONARY ARTERY DISEASE LESS THAN 10% IN NEXT 10 YEARS: ICD-10-CM

## 2022-03-30 NOTE — TELEPHONE ENCOUNTER
Have ordered a lipid profile and a PSA.  He had a CBC in CMP done in September 2021 which were both normal and these do not need to be repeated at this time.

## 2022-04-01 ENCOUNTER — LAB VISIT (OUTPATIENT)
Dept: LAB | Facility: HOSPITAL | Age: 67
End: 2022-04-01
Attending: FAMILY MEDICINE
Payer: MEDICARE

## 2022-04-01 DIAGNOSIS — Z12.5 SPECIAL SCREENING FOR MALIGNANT NEOPLASM OF PROSTATE: ICD-10-CM

## 2022-04-01 DIAGNOSIS — Z91.89 ENCOUNTER FOR SCREENING FOR CORONARY ARTERY DISEASE IN PATIENT WITH RISK FOR CORONARY ARTERY DISEASE LESS THAN 10% IN NEXT 10 YEARS: ICD-10-CM

## 2022-04-01 DIAGNOSIS — Z13.6 ENCOUNTER FOR SCREENING FOR CORONARY ARTERY DISEASE IN PATIENT WITH RISK FOR CORONARY ARTERY DISEASE LESS THAN 10% IN NEXT 10 YEARS: ICD-10-CM

## 2022-04-01 LAB
CHOLEST SERPL-MCNC: 181 MG/DL (ref 120–199)
CHOLEST/HDLC SERPL: 3.5 {RATIO} (ref 2–5)
COMPLEXED PSA SERPL-MCNC: 23.7 NG/ML (ref 0–4)
HDLC SERPL-MCNC: 51 MG/DL (ref 40–75)
HDLC SERPL: 28.2 % (ref 20–50)
LDLC SERPL CALC-MCNC: 114.2 MG/DL (ref 63–159)
NONHDLC SERPL-MCNC: 130 MG/DL
TRIGL SERPL-MCNC: 79 MG/DL (ref 30–150)

## 2022-04-01 PROCEDURE — 36415 COLL VENOUS BLD VENIPUNCTURE: CPT | Mod: PO | Performed by: FAMILY MEDICINE

## 2022-04-01 PROCEDURE — 84153 ASSAY OF PSA TOTAL: CPT | Performed by: FAMILY MEDICINE

## 2022-04-01 PROCEDURE — 80061 LIPID PANEL: CPT | Performed by: FAMILY MEDICINE

## 2022-04-22 ENCOUNTER — OFFICE VISIT (OUTPATIENT)
Dept: ORTHOPEDICS | Facility: CLINIC | Age: 67
End: 2022-04-22
Payer: MEDICARE

## 2022-04-22 ENCOUNTER — HOSPITAL ENCOUNTER (OUTPATIENT)
Dept: RADIOLOGY | Facility: HOSPITAL | Age: 67
Discharge: HOME | End: 2022-04-22
Attending: ORTHOPAEDIC SURGERY
Payer: MEDICARE

## 2022-04-22 VITALS — WEIGHT: 165 LBS | RESPIRATION RATE: 16 BRPM | HEIGHT: 69 IN | BODY MASS INDEX: 24.44 KG/M2

## 2022-04-22 DIAGNOSIS — M75.122 NONTRAUMATIC COMPLETE TEAR OF LEFT ROTATOR CUFF: ICD-10-CM

## 2022-04-22 DIAGNOSIS — M75.122 NONTRAUMATIC COMPLETE TEAR OF LEFT ROTATOR CUFF: Primary | ICD-10-CM

## 2022-04-22 PROCEDURE — 99999 PR PBB SHADOW E&M-EST. PATIENT-LVL II: ICD-10-PCS | Mod: PBBFAC,,, | Performed by: ORTHOPAEDIC SURGERY

## 2022-04-22 PROCEDURE — 73030 X-RAY EXAM OF SHOULDER: CPT | Mod: 26,LT,, | Performed by: RADIOLOGY

## 2022-04-22 PROCEDURE — 99213 PR OFFICE/OUTPT VISIT, EST, LEVL III, 20-29 MIN: ICD-10-PCS | Mod: S$PBB,,, | Performed by: ORTHOPAEDIC SURGERY

## 2022-04-22 PROCEDURE — 73030 X-RAY EXAM OF SHOULDER: CPT | Mod: TC,PN,LT

## 2022-04-22 PROCEDURE — 99212 OFFICE O/P EST SF 10 MIN: CPT | Mod: PBBFAC,PN | Performed by: ORTHOPAEDIC SURGERY

## 2022-04-22 PROCEDURE — 99213 OFFICE O/P EST LOW 20 MIN: CPT | Mod: S$PBB,,, | Performed by: ORTHOPAEDIC SURGERY

## 2022-04-22 PROCEDURE — 99999 PR PBB SHADOW E&M-EST. PATIENT-LVL II: CPT | Mod: PBBFAC,,, | Performed by: ORTHOPAEDIC SURGERY

## 2022-04-22 PROCEDURE — 73030 XR SHOULDER COMPLETE 2 OR MORE VIEWS LEFT: ICD-10-PCS | Mod: 26,LT,, | Performed by: RADIOLOGY

## 2022-04-22 NOTE — PROGRESS NOTES
4/22/2022    No past medical history on file.    Past Surgical History:   Procedure Laterality Date    ARTHROSCOPIC ACROMIOPLASTY OF SHOULDER Left 9/23/2021    Procedure: ACROMIOPLASTY, ARTHROSCOPIC;  Surgeon: Fadi Craven MD;  Location: [facility];  Service: Orthopedics;  Laterality: Left;    ROTATOR CUFF REPAIR Left 9/23/2021    Procedure: REPAIR, ROTATOR CUFF;  Surgeon: Fadi Craven MD;  Location: [facility] OR;  Service: Orthopedics;  Laterality: Left;    SHOULDER ARTHROSCOPY Left 9/23/2021    Procedure: ARTHROSCOPY, SHOULDER;  Surgeon: Fadi Craven MD;  Location: [facility] OR;  Service: Orthopedics;  Laterality: Left;  BEACH CHAIR, ANTONIO! , SHOULDER BOOM([facility])       Current Outpatient Medications   Medication Sig    acetaminophen (TYLENOL) 325 MG tablet Take 325 mg by mouth every 6 (six) hours as needed for Pain.    ondansetron (ZOFRAN) 4 MG tablet Take 1 tablet (4 mg total) by mouth every 6 (six) hours as needed for Nausea.    oxyCODONE-acetaminophen (PERCOCET)  mg per tablet Take 1 tablet by mouth every 4 (four) hours as needed for Pain.     No current facility-administered medications for this visit.       Review of patient's allergies indicates:  No Known Allergies    No family history on file.    Social History     Socioeconomic History    Marital status:    Tobacco Use    Smoking status: Never Smoker    Smokeless tobacco: Never Used       Chief Complaint:   Chief Complaint   Patient presents with    Left Shoulder - Follow-up     DOS: 09/23/21-- 7 mo s/p Left Mini Open RCR. Pt reports 2/10 pain level today with certain movements. He has completed outpatient physical therapy.          History of present illness:    This is a 67 y.o. year old male who complains of patient is now about 7 months status post left mini rotator cuff repair he patient reports about 2/10 pain with certain movements he has completed his outpatient physical therapy patient is presently working  "out at the gym he has finished his physical therapy he reports 2/10 pain at times with certain movements but for the most part feels much better since his surgery    Review of Systems:    Constitution: Denies chills, fever, and sweats.  HENT: Denies headaches or blurry vision.  Cardiovascular: Denies chest pain or irregular heart beat.  Respiratory: Denies cough or shortness of breath.  Gastrointestinal: Denies abdominal pain, nausea, or vomiting.  Musculoskeletal:  Denies muscle cramps.  Neurological: Denies dizziness or focal weakness.  Psychiatric/Behavioral: Normal mental status.  Hematologic/Lymphatic: Denies bleeding problem or easy bruising/bleeding.  Skin: Denies rash or suspicious lesions.    Examination:    Vital Signs:    Vitals:    04/22/22 0900   Resp: 16   Weight: 74.8 kg (165 lb)   Height: 5' 9" (1.753 m)   PainSc:   2   PainLoc: Shoulder       Body mass index is 24.37 kg/m².    This a well-developed, well nourished patient in no acute distress.    Alert and oriented x 3 and cooperative to examination.       Physical Exam:  Left shoulder-patient has full active and passive range of motion of left shoulder there is no crepitance he has some slight weakness in abduction strength    Imaging:  X-rays show good position of the humeral head good position of the hardware       Assessment: Nontraumatic complete tear of left rotator cuff        Plan:  The patient is in continue his physical therapy as home I will have him return as needed patient can take some anti-inflammatory medications also has needed continue his physical therapy on his own.      DISCLAIMER: This note may have been dictated using voice recognition software and may contain grammatical errors.     NOTE: Consult report sent to referring provider via EPIC EMR.  4/22/2022    No past medical history on file.    Past Surgical History:   Procedure Laterality Date    ARTHROSCOPIC ACROMIOPLASTY OF SHOULDER Left 9/23/2021    Procedure: " ACROMIOPLASTY, ARTHROSCOPIC;  Surgeon: Fadi Craven MD;  Location: [facility] OR;  Service: Orthopedics;  Laterality: Left;    ROTATOR CUFF REPAIR Left 9/23/2021    Procedure: REPAIR, ROTATOR CUFF;  Surgeon: Fadi Craven MD;  Location: [facility] OR;  Service: Orthopedics;  Laterality: Left;    SHOULDER ARTHROSCOPY Left 9/23/2021    Procedure: ARTHROSCOPY, SHOULDER;  Surgeon: Fadi Craven MD;  Location: [facility] OR;  Service: Orthopedics;  Laterality: Left;  BEACH CHAIR, ANTONIO! , SHOULDER BOOM([facility])       Current Outpatient Medications   Medication Sig    acetaminophen (TYLENOL) 325 MG tablet Take 325 mg by mouth every 6 (six) hours as needed for Pain.    ondansetron (ZOFRAN) 4 MG tablet Take 1 tablet (4 mg total) by mouth every 6 (six) hours as needed for Nausea.    oxyCODONE-acetaminophen (PERCOCET)  mg per tablet Take 1 tablet by mouth every 4 (four) hours as needed for Pain.     No current facility-administered medications for this visit.       Review of patient's allergies indicates:  No Known Allergies    No family history on file.    Social History     Socioeconomic History    Marital status:    Tobacco Use    Smoking status: Never Smoker    Smokeless tobacco: Never Used       Chief Complaint:   Chief Complaint   Patient presents with    Left Shoulder - Follow-up     DOS: 09/23/21-- 7 mo s/p Left Mini Open RCR. Pt reports 2/10 pain level today with certain movements. He has completed outpatient physical therapy.          History of present illness:    This is a 67 y.o. year old male who complains of     Review of Systems:    Constitution: Denies chills, fever, and sweats.  HENT: Denies headaches or blurry vision.  Cardiovascular: Denies chest pain or irregular heart beat.  Respiratory: Denies cough or shortness of breath.  Gastrointestinal: Denies abdominal pain, nausea, or vomiting.  Musculoskeletal:  Denies muscle cramps.  Neurological: Denies dizziness or focal  "weakness.  Psychiatric/Behavioral: Normal mental status.  Hematologic/Lymphatic: Denies bleeding problem or easy bruising/bleeding.  Skin: Denies rash or suspicious lesions.    Examination:    Vital Signs:    Vitals:    04/22/22 0900   Resp: 16   Weight: 74.8 kg (165 lb)   Height: 5' 9" (1.753 m)   PainSc:   2   PainLoc: Shoulder       Body mass index is 24.37 kg/m².    This a well-developed, well nourished patient in no acute distress.    Alert and oriented x 3 and cooperative to examination.       Physical Exam:   Imaging:        Assessment: Nontraumatic complete tear of left rotator cuff        Plan:        DISCLAIMER: This note may have been dictated using voice recognition software and may contain grammatical errors.     NOTE: Consult report sent to referring provider via EPIC EMR.  4/22/2022    No past medical history on file.    Past Surgical History:   Procedure Laterality Date    ARTHROSCOPIC ACROMIOPLASTY OF SHOULDER Left 9/23/2021    Procedure: ACROMIOPLASTY, ARTHROSCOPIC;  Surgeon: Fadi Craven MD;  Location: [facility] OR;  Service: Orthopedics;  Laterality: Left;    ROTATOR CUFF REPAIR Left 9/23/2021    Procedure: REPAIR, ROTATOR CUFF;  Surgeon: Fadi Craven MD;  Location: [facility] OR;  Service: Orthopedics;  Laterality: Left;    SHOULDER ARTHROSCOPY Left 9/23/2021    Procedure: ARTHROSCOPY, SHOULDER;  Surgeon: Fadi Craven MD;  Location: [facility] OR;  Service: Orthopedics;  Laterality: Left;  BEACH CHAIR, ANTONIO! , SHOULDER BOOM([facility])       Current Outpatient Medications   Medication Sig    acetaminophen (TYLENOL) 325 MG tablet Take 325 mg by mouth every 6 (six) hours as needed for Pain.    ondansetron (ZOFRAN) 4 MG tablet Take 1 tablet (4 mg total) by mouth every 6 (six) hours as needed for Nausea.    oxyCODONE-acetaminophen (PERCOCET)  mg per tablet Take 1 tablet by mouth every 4 (four) hours as needed for Pain.     No current facility-administered medications for this " "visit.       Review of patient's allergies indicates:  No Known Allergies    No family history on file.    Social History     Socioeconomic History    Marital status:    Tobacco Use    Smoking status: Never Smoker    Smokeless tobacco: Never Used       Chief Complaint:   Chief Complaint   Patient presents with    Left Shoulder - Follow-up     DOS: 09/23/21-- 7 mo s/p Left Mini Open RCR. Pt reports 2/10 pain level today with certain movements. He has completed outpatient physical therapy.          History of present illness:    This is a 67 y.o. year old male who complains of     Review of Systems:    Constitution: Denies chills, fever, and sweats.  HENT: Denies headaches or blurry vision.  Cardiovascular: Denies chest pain or irregular heart beat.  Respiratory: Denies cough or shortness of breath.  Gastrointestinal: Denies abdominal pain, nausea, or vomiting.  Musculoskeletal:  Denies muscle cramps.  Neurological: Denies dizziness or focal weakness.  Psychiatric/Behavioral: Normal mental status.  Hematologic/Lymphatic: Denies bleeding problem or easy bruising/bleeding.  Skin: Denies rash or suspicious lesions.    Examination:    Vital Signs:    Vitals:    04/22/22 0900   Resp: 16   Weight: 74.8 kg (165 lb)   Height: 5' 9" (1.753 m)   PainSc:   2   PainLoc: Shoulder       Body mass index is 24.37 kg/m².    This a well-developed, well nourished patient in no acute distress.    Alert and oriented x 3 and cooperative to examination.       Physical Exam:     Imaging:       Assessment: Nontraumatic complete tear of left rotator cuff        Plan:        DISCLAIMER: This note may have been dictated using voice recognition software and may contain grammatical errors.     NOTE: Consult report sent to referring provider via EPIC EMR.    "

## 2022-04-26 DIAGNOSIS — C61 MALIGNANT NEOPLASM OF PROSTATE: Primary | ICD-10-CM

## 2022-04-27 ENCOUNTER — HOSPITAL ENCOUNTER (OUTPATIENT)
Dept: RADIOLOGY | Facility: HOSPITAL | Age: 67
Discharge: HOME | End: 2022-04-27
Attending: SPECIALIST
Payer: MEDICARE

## 2022-04-27 DIAGNOSIS — C61 MALIGNANT NEOPLASM OF PROSTATE: ICD-10-CM

## 2022-04-27 PROCEDURE — 25500020 PHARM REV CODE 255: Performed by: SPECIALIST

## 2022-04-27 PROCEDURE — 78306 BONE IMAGING WHOLE BODY: CPT | Mod: TC

## 2022-04-27 PROCEDURE — A9503 TC99M MEDRONATE: HCPCS

## 2022-04-27 PROCEDURE — 74178 CT ABD&PLV WO CNTR FLWD CNTR: CPT | Mod: TC

## 2022-04-27 RX ADMIN — IOHEXOL 100 ML: 350 INJECTION, SOLUTION INTRAVENOUS at 10:04

## 2022-05-05 ENCOUNTER — OFFICE VISIT (OUTPATIENT)
Dept: FAMILY MEDICINE | Facility: CLINIC | Age: 67
End: 2022-05-05
Payer: MEDICARE

## 2022-05-05 VITALS
HEART RATE: 52 BPM | DIASTOLIC BLOOD PRESSURE: 70 MMHG | WEIGHT: 172.19 LBS | OXYGEN SATURATION: 97 % | HEIGHT: 69 IN | TEMPERATURE: 98 F | SYSTOLIC BLOOD PRESSURE: 132 MMHG | BODY MASS INDEX: 25.5 KG/M2

## 2022-05-05 DIAGNOSIS — Z11.59 ENCOUNTER FOR HEPATITIS C SCREENING TEST FOR LOW RISK PATIENT: Primary | ICD-10-CM

## 2022-05-05 DIAGNOSIS — C61 PROSTATE CANCER: ICD-10-CM

## 2022-05-05 DIAGNOSIS — R76.11 POSITIVE PPD: ICD-10-CM

## 2022-05-05 PROCEDURE — 99213 PR OFFICE/OUTPT VISIT, EST, LEVL III, 20-29 MIN: ICD-10-PCS | Mod: S$PBB,,, | Performed by: FAMILY MEDICINE

## 2022-05-05 PROCEDURE — 99999 PR PBB SHADOW E&M-EST. PATIENT-LVL III: CPT | Mod: PBBFAC,,, | Performed by: FAMILY MEDICINE

## 2022-05-05 PROCEDURE — 99213 OFFICE O/P EST LOW 20 MIN: CPT | Mod: PBBFAC,PN | Performed by: FAMILY MEDICINE

## 2022-05-05 PROCEDURE — 99999 PR PBB SHADOW E&M-EST. PATIENT-LVL III: ICD-10-PCS | Mod: PBBFAC,,, | Performed by: FAMILY MEDICINE

## 2022-05-05 PROCEDURE — 99213 OFFICE O/P EST LOW 20 MIN: CPT | Mod: S$PBB,,, | Performed by: FAMILY MEDICINE

## 2022-05-05 RX ORDER — LEUPROLIDE ACETATE 22.5 MG
22.5 SYRINGE (EA) SUBCUTANEOUS
Qty: 1 EACH | Refills: 3
Start: 2022-05-05 | End: 2023-06-25

## 2022-05-05 NOTE — PROGRESS NOTES
Subjective:       Patient ID: Rusty Sen II is a 67 y.o. male.    Chief Complaint: Prostate Cancer    Patient presents here to get re-established with me.  He was recently diagnosed with prostate cancer.  On his routine screening prior to seeing me, his PSA was elevated to 23.7.  He was referred to Urology and saw .  He had a biopsy which he states was reported out as a Thais stage 9 prostate cancer.  I do not have the path report to verify this but this will be obtained.  He has been started on hormonal therapy with Eligard 22.5 mg subcu every 3 months.  He has an appointment to see Radiation Oncology tomorrow to see what possible therapy may be necessary from a radiation standpoint.  He has no other chronic medical issues.  He did have rotator cuff surgery within the last year on the right shoulder and is recovering fairly well.  As far as health maintenance, he has had both the Prevnar 13 and Pneumovax in the past as well as his shingles vaccine series.  These were done at [facility] and he will try to get records for me.  He also has had his COVID-19 2 shot series as well as a booster.  Remaining things to get taking care of on the health maintenance list include a tetanus vaccine, colorectal cancer screening, and hepatitis C screen.  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  When he was diagnosed years ago, he did take a course of INH.  We did discuss getting a QuantiFERON gold to see if this remains positive or negative.  If negative, he does not need to do annual chest x-rays.    Review of Systems   Constitutional: Negative for chills, fatigue, fever and unexpected weight change.   HENT: Negative for nasal congestion, postnasal drip and sore throat.    Respiratory: Negative for cough and shortness of breath.    Cardiovascular: Negative for chest pain and  palpitations.   Gastrointestinal: Negative for abdominal pain, diarrhea, nausea and vomiting.        He does have what sounds like mild reflux once or twice weekly   Genitourinary: Negative for difficulty urinating, dysuria and frequency.        Recent diagnosis of prostate cancer   Musculoskeletal: Negative for arthralgias and back pain.        Right rotator cuff repair within the last year   Neurological: Negative for dizziness, light-headedness and headaches.   Psychiatric/Behavioral: Negative for sleep disturbance. The patient is not nervous/anxious.          Objective:      Physical Exam  Vitals reviewed.   Constitutional:       General: He is not in acute distress.     Appearance: Normal appearance. He is well-developed.   HENT:      Right Ear: Tympanic membrane and external ear normal.      Left Ear: Tympanic membrane and external ear normal.      Mouth/Throat:      Pharynx: Oropharynx is clear. No posterior oropharyngeal erythema.   Neck:      Thyroid: No thyromegaly.   Cardiovascular:      Rate and Rhythm: Normal rate and regular rhythm.      Pulses: Normal pulses.      Heart sounds: Normal heart sounds. No murmur heard.  Pulmonary:      Effort: Pulmonary effort is normal.      Breath sounds: Normal breath sounds. No wheezing or rales.   Abdominal:      General: Bowel sounds are normal.      Palpations: Abdomen is soft.      Tenderness: There is no abdominal tenderness. There is no guarding or rebound.   Genitourinary:     Comments: Recently done by Urology  Musculoskeletal:         General: Normal range of motion.      Cervical back: Normal range of motion and neck supple.      Right lower leg: No edema.      Left lower leg: No edema.   Lymphadenopathy:      Cervical: No cervical adenopathy.   Neurological:      General: No focal deficit present.      Mental Status: He is alert and oriented to person, place, and time.      Cranial Nerves: No cranial nerve deficit.      Deep Tendon Reflexes: Reflexes are  normal and symmetric.         Assessment:       Problem List Items Addressed This Visit     Prostate cancer    Relevant Medications    leuprolide, 3 month, (ELIGARD, 3 MONTH,) 22.5 mg Syrg syringe      Other Visit Diagnoses     Encounter for hepatitis C screening test for low risk patient    -  Primary    Relevant Orders    Hepatitis C Antibody    Positive PPD        Relevant Orders    QUANTIFERON GOLD TB          Plan:       1. Continue treatment for his prostate cancer with Eligard subQ every 3 months  2. He is to follow-up with Radiation Oncology tomorrow  3. Hepatitis C screen as well as QuantiFERON gold in the near future  4. He will contact Dr. Clemente Hackettor to get colonoscopy scheduled  5. He will also obtain records of his immunizations for our chart  6. We will contact the urologist office to get a copy of his path report  7. Follow up with me in 1 year or p.r.n.

## 2022-05-06 ENCOUNTER — TELEPHONE (OUTPATIENT)
Dept: RADIATION ONCOLOGY | Facility: CLINIC | Age: 67
End: 2022-05-06

## 2022-05-06 ENCOUNTER — OFFICE VISIT (OUTPATIENT)
Dept: RADIATION ONCOLOGY | Facility: CLINIC | Age: 67
End: 2022-05-06
Payer: MEDICARE

## 2022-05-06 ENCOUNTER — LAB VISIT (OUTPATIENT)
Dept: LAB | Facility: HOSPITAL | Age: 67
End: 2022-05-06
Attending: FAMILY MEDICINE
Payer: MEDICARE

## 2022-05-06 VITALS
WEIGHT: 172.69 LBS | RESPIRATION RATE: 18 BRPM | BODY MASS INDEX: 25.58 KG/M2 | TEMPERATURE: 98 F | DIASTOLIC BLOOD PRESSURE: 69 MMHG | HEIGHT: 69 IN | HEART RATE: 46 BPM | OXYGEN SATURATION: 98 % | SYSTOLIC BLOOD PRESSURE: 172 MMHG

## 2022-05-06 DIAGNOSIS — C61 PROSTATE CANCER: Primary | ICD-10-CM

## 2022-05-06 DIAGNOSIS — R76.11 POSITIVE PPD: ICD-10-CM

## 2022-05-06 PROCEDURE — 86480 TB TEST CELL IMMUN MEASURE: CPT | Performed by: FAMILY MEDICINE

## 2022-05-06 PROCEDURE — 99205 PR OFFICE/OUTPT VISIT, NEW, LEVL V, 60-74 MIN: ICD-10-PCS | Mod: S$GLB,,, | Performed by: RADIOLOGY

## 2022-05-06 PROCEDURE — 99417 PROLNG OP E/M EACH 15 MIN: CPT | Mod: S$GLB,,, | Performed by: RADIOLOGY

## 2022-05-06 PROCEDURE — 99417 PR PROLONGED SVC, OUTPT, W/WO DIRECT PT CONTACT,  EA ADDTL 15 MIN: ICD-10-PCS | Mod: S$GLB,,, | Performed by: RADIOLOGY

## 2022-05-06 PROCEDURE — 99205 OFFICE O/P NEW HI 60 MIN: CPT | Mod: S$GLB,,, | Performed by: RADIOLOGY

## 2022-05-06 NOTE — Clinical Note
Anna Sen coming to you. Has high risk PCa (PSA 23.7, 2 cores 4+5 dz). Planning definitive RT + long term ADT (Gildardo). Sending for discussion of PO agent (STAMPEDE) Lancet. 2022 Jan 29;399(70957):640-848. doi: 10.1016/-[id number]([id number]. Epub 2021 Dec 23

## 2022-05-06 NOTE — PROGRESS NOTES
Rusty Sen   [id number]  1955  5/6/2022  James Walter Md  [address] [facility]  [address] [place] [address]    REASON FOR CONSULTATION: High risk adenocarcinoma of the prostate, [id number] GS 4+5 iPSA 23.7    TREATMENT GOAL: primary    HISTORY OF PRESENT ILLNESS:   67 y.o. male with positive family history of prostate cancer (father) who presented with elevated screening PSA of 23.7 to Dr. Walter with negative JIL.    He underwent transrectal ultrasound and biopsy with Dr. Walter, 04/18/2022:   - [place] 4 + 5 adenocarcinoma: 80% RB 2/2 (PNI)   - [place] 4 + 3 adenocarcinoma: 70% RM (PNI)   - [place] 3 + 4 adenocarcinoma: 30% RA   - [place] 3 + 3 adenocarcinoma: 25% LB 1/2  - 5/8 cores+  - hypoechoic nodule at right base   - 31g    CT abdomen pelvis demonstrated ovoid sclerotic lesion L5, left sacrum and left superior pubic ramus with differential of bone island versus metastatic disease, the former favored by lack of uptake on bone scan which was otherwise negative.    Patient was started on Eligard and referred for radiotherapeutic options.    PSA  4/22 23.7    AUA 15  QOL 3  IIEF Not answered (on ADT)    Denies dysuria, hematuria, diarrhea, bright red blood per rectum, bone pain.  He is a retired physician and maintains an active lifestyle.    Review of systems otherwise negative unless indicated in HPI.    Past Medical History:   Diagnosis Date    Cancer      Past Surgical History:   Procedure Laterality Date    ARTHROSCOPIC ACROMIOPLASTY OF SHOULDER Left 9/23/2021    Procedure: ACROMIOPLASTY, ARTHROSCOPIC;  Surgeon: Fadi Craven MD;  Location: [facility] OR;  Service: Orthopedics;  Laterality: Left;    ROTATOR CUFF REPAIR Left 9/23/2021    Procedure: REPAIR, ROTATOR CUFF;  Surgeon: Fadi Craven MD;  Location: [facility] OR;  Service: Orthopedics;  Laterality: Left;    SHOULDER ARTHROSCOPY Left 9/23/2021    Procedure: ARTHROSCOPY, SHOULDER;  Surgeon: Fadi Craven MD;   "Location: [facility] OR;  Service: Orthopedics;  Laterality: Left;  BEACH CHAIR, ANTONIO! , SHOULDER BOOM([facility])     Social History     Socioeconomic History    Marital status:    Tobacco Use    Smoking status: Never Smoker    Smokeless tobacco: Never Used     Family History   Problem Relation Age of Onset    Lymphoma Father     Prostate cancer Father     Breast cancer Sister     Lung cancer Paternal Aunt        PRIOR HISTORY OF CHEMOTHERAPY OR RADIOTHERAPY: Please see HPI for patients prior oncologic history.    Medication List with Changes/Refills   Current Medications    ACETAMINOPHEN (TYLENOL) 325 MG TABLET    Take 325 mg by mouth every 6 (six) hours as needed for Pain.    LEUPROLIDE, 3 MONTH, (ELIGARD, 3 MONTH,) 22.5 MG SYRG SYRINGE    Inject 22.5 mg into the skin every 3 (three) months.     Review of patient's allergies indicates:  No Known Allergies    QUALITY OF LIFE: 100%- Normal, No Complaints, No Evidence of Disease    Vitals:    05/06/22 0801   BP: (!) 172/69   Pulse: (!) 46   Resp: 18   Temp: 98.1 °F (36.7 °C)   SpO2: 98%   Weight: 78.3 kg (172 lb 11.2 oz)   Height: 5' 9" (1.753 m)   PainSc: 0-No pain     Body mass index is 25.5 kg/m².    PHYSICAL EXAM:   GENERAL: alert; in no apparent distress.   HEAD: normocephalic, atraumatic.  EYES: pupils are equal, round, reactive to light and accommodation. Sclera anicteric. Conjunctiva not injected.   NOSE/THROAT: no nasal erythema or rhinorrhea.   NECK: no cervical motion rigidity; supple with no masses.    CHEST: Patient is speaking comfortably on room air with normal work of breathing without using accessory muscles of respiration.  ABDOMEN: soft, nontender, nondistended.   MUSCULOSKELETAL: no tenderness to palpation along the spine or scapulae. Normal range of motion.  NEUROLOGIC: cranial nerves II-XII intact bilaterally. Strength 5/5 in bilateral upper and lower extremities. No sensory deficits appreciated. Normal gait.   EXTREMITIES: no " clubbing, cyanosis, edema.  SKIN: no erythema, rashes, ulcerations noted.     REVIEW OF IMAGING/PATHOLOGY/LABS: Please see HPI. All images reviewed personally by dictating physician.     ASSESSMENT: 67 y.o. male with high risk adenocarcinoma of the prostate, [id number] GS 4+5 iPSA 23.7.  PLAN:  Rusty Sen II presents with elevated PSA > 20 and negative JIL (right base nodule on TRUS) with 5/8 cores containing adenocarcinoma prostate including 2 cores from the right base with [place] 4 + 5 disease and perineural invasion.  CT abdomen pelvis and bone scan a to review no worthy for suspected bone islands at L5, sacrum and left superior pubic ramus disease did not demonstrate uptake on bone scan.  Given his high risk designation I would like to send him for MRI of the prostate to evaluate the capsule and seminal vesicles.    Describing his high risk designation I shared my concern for subclinical disease within the lymphatic distribution +/- T3 disease and my expectation that radical prostatectomy would result in positive margin and/or PSA failure requiring salvage radiotherapy with no demonstrable benefit to planned combined modality treatment.  In this circumstance I recommend definitive radiotherapy which permits coverage of the prostate/seminal vesicles and periprostatic space as well as pelvic lymph node distribution.  I explained that he is not a candidate for brachytherapy monotherapy but that some centers will provide a brachytherapy boost with data demonstrating faster decline in PSA with this method.  Brachytherapy is not offered at our institution.  We also briefly discussed proton beam therapy which would require travel to a proton center with expected equal efficacy and thus far no strong data backing up theoretical benefit in AEs for prostate cancer.  We also discussed modern trials of moderately hypofractionated radiotherapy but when covering the pelvic lymph node distribution I advised standard  fractionation to minimize late effects to the small bowel.      I described standard of care using IGRT requiring 3 noncoplanar gold fiducials, IMRT based planning to minimize dose to surrounding structures, advanced image guidance using cone beam CT for targeting while also evaluating daily bowel and bladder preparation both of which are demonstrated to improve acute and late effects.  My recommendation is total dose of 8100 cGy.  Lastly we discussed recommendation for long-term hormone deprivation to begin concurrently with radiotherapy.  I advise 2 years of therapy.  Three-month Eligard has been placed by Dr. Walter.  Finally, we discussed emerging data regarding addition of p.o. hormone blocking agents in very high risk patients and he would like to discuss this further with Dr. Garcia; referral placed.    I carefully explained the process of simulation and treatment delivery with weekly physician visits.      We discussed the risks and benefits of the above treatment and have gone over in detail the acute and late toxicities of radiation therapy to the pelvis, prostate/SVs. The patient expressed  understanding and has signed a consent form which is included in the patients chart.      The patient has our contact information and understands that they are free to contact us at any time with questions or concerns regarding radiation therapy.     DISPOSITION: RTC FOR CT SIM after fiducials     I have personally seen and evaluated this patient with a high complexity diagnosis.      Greater than 90 minutes were dedicated to reviewing/interpreting pertinent laboratory/imaging/pathology as well as prior consultations; reviewing and performing history and physical; counseling patient on oncologic recommendations; documentation in the electronic medical record including ordering of additional tests and/or radiation treatment protocol; and coordination of care with physicians with referrals placed as  appropriate.     COVID-19 precautions discussed. Cancer Center policy for COVID-19 testing described.  Patient will be required to wear a mask when in the Cancer Center.    PHYSICIAN: Jamie Martin Jr, MD    Thank you for the opportunity to meet and consult with Rusty Sen II.   Please feel free to contact me to discuss the above recommendation further.

## 2022-05-10 ENCOUNTER — PATIENT MESSAGE (OUTPATIENT)
Dept: ADMINISTRATIVE | Facility: HOSPITAL | Age: 67
End: 2022-05-10
Payer: MEDICARE

## 2022-05-10 DIAGNOSIS — Z12.11 SCREENING FOR COLON CANCER: ICD-10-CM

## 2022-05-12 ENCOUNTER — HOSPITAL ENCOUNTER (OUTPATIENT)
Dept: RADIOLOGY | Facility: HOSPITAL | Age: 67
Discharge: HOME | End: 2022-05-12
Attending: RADIOLOGY
Payer: MEDICARE

## 2022-05-12 DIAGNOSIS — C61 PROSTATE CANCER: ICD-10-CM

## 2022-05-12 LAB
GAMMA INTERFERON BACKGROUND BLD IA-ACNC: 0 IU/ML
M TB IFN-G BLD-IMP: POSITIVE
M TB IFN-G CD4+ BCKGRND COR BLD-ACNC: 2.22 IU/ML
M TB IFN-G CD4+CD8+ BCKGRND COR BLD-ACNC: 1.21 IU/ML
MITOGEN IGNF BLD-ACNC: >10 IU/ML
QUANTIFERON CRITERIA: ABNORMAL

## 2022-05-12 PROCEDURE — 25500020 PHARM REV CODE 255: Performed by: RADIOLOGY

## 2022-05-12 PROCEDURE — 72197 MRI PELVIS W/O & W/DYE: CPT | Mod: TC

## 2022-05-12 PROCEDURE — 72197 MRI PROSTATE W W/O CONTRAST: ICD-10-PCS | Mod: 26,,, | Performed by: RADIOLOGY

## 2022-05-12 PROCEDURE — A9585 GADOBUTROL INJECTION: HCPCS | Performed by: RADIOLOGY

## 2022-05-12 PROCEDURE — 72197 MRI PELVIS W/O & W/DYE: CPT | Mod: 26,,, | Performed by: RADIOLOGY

## 2022-05-12 RX ORDER — GADOBUTROL 604.72 MG/ML
7 INJECTION INTRAVENOUS
Status: COMPLETED | OUTPATIENT
Start: 2022-05-12 | End: 2022-05-12

## 2022-05-12 RX ADMIN — GADOBUTROL 7 ML: 604.72 INJECTION INTRAVENOUS at 08:05

## 2022-05-15 NOTE — PROGRESS NOTES
[facility] Hematolgy/Oncology  History & Physical    Subjective:      Patient ID:   NAME: Rusty Sen II : 1955     67 y.o. male    Referring Doc: Jamie Martin Jr., MD  Other Physicians: Merissa Sofia; Jose Angel        Chief Complaint: prostate cancer      HPI:  67 y.o. male with diagnosis of prostate cancer who has been referred by Jamie Martin Jr., MD for evaluation by medical hematology/oncology. 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 He was recently diagnosed with prostate cancer after having been found to have an elevated PSA at 23.7. He underwent a transrectal US with biopsy with Dr Walter with  on 2022. Pathology came back showing adenocarcinoma in 5 out of 8 cores with: 80%  RB [place] 4+5, 70% RM [place] 3+4; 30% RA [place] 3 + 4; and 25% LB [place] 3 + 3.     He had a CT abdomen pelvis on  which showed sclerotic lesions at L5, left sacrum and left superior pubic ramus with differential of bone island versus metastatic disease, however, the bone scan on 2022 was negative.     Breathing ok, no CP, SOB, HA's or N/V      Stage IIIC (cT2a N0 M0)              ROS:   GEN: normal without any fever, night sweats or weight loss  HEENT: normal with no HA's, sore throat, stiff neck, changes in vision  CV: normal with no CP, SOB, PND, FAIRCHILD or orthopnea  PULM: normal with no SOB, cough, hemoptysis, sputum or pleuritic pain  GI: normal with no abdominal pain, nausea, vomiting, constipation, diarrhea, melanotic stools, BRBPR, or hematemesis  : normal with no hematuria, dysuria  BREAST: normal with no mass, discharge, pain   SKIN: normal with no rash, erythema, bruising, or swelling       Past Medical/Surgical History:  Past Medical History:   Diagnosis Date    Cancer      Past Surgical History:   Procedure Laterality Date    ARTHROSCOPIC ACROMIOPLASTY OF SHOULDER Left 2021    Procedure: ACROMIOPLASTY, ARTHROSCOPIC;  Surgeon: Fadi Craven MD;   "Location: [facility] OR;  Service: Orthopedics;  Laterality: Left;    ROTATOR CUFF REPAIR Left 9/23/2021    Procedure: REPAIR, ROTATOR CUFF;  Surgeon: Fadi Craven MD;  Location: [facility] OR;  Service: Orthopedics;  Laterality: Left;    SHOULDER ARTHROSCOPY Left 9/23/2021    Procedure: ARTHROSCOPY, SHOULDER;  Surgeon: Fadi Craven MD;  Location: [facility] OR;  Service: Orthopedics;  Laterality: Left;  BEACH CHAIR, ANTONIO! , SHOULDER BOOM([facility])         Allergies:  Review of patient's allergies indicates:  No Known Allergies    Social/Family History:  Social History     Socioeconomic History    Marital status:    Tobacco Use    Smoking status: Never Smoker    Smokeless tobacco: Never Used     Family History   Problem Relation Age of Onset    Lymphoma Father     Prostate cancer Father     Breast cancer Sister     Lung cancer Paternal Aunt          Medications:    Current Outpatient Medications:     acetaminophen (TYLENOL) 325 MG tablet, Take 325 mg by mouth every 6 (six) hours as needed for Pain., Disp: , Rfl:     leuprolide, 3 month, (ELIGARD, 3 MONTH,) 22.5 mg Syrg syringe, Inject 22.5 mg into the skin every 3 (three) months., Disp: 1 each, Rfl: 3      Pathology:  Cancer Staging  Prostate cancer  Staging form: Prostate, AJCC 8th Edition  - Clinical: Stage IIIC (cT2a, cN0, cM0, PSA: 23.7, Grade Group: 5) - Signed by Jamie Martin Jr., MD on 5/6/2022        Objective:   Vitals:  Blood pressure 115/73, pulse (!) 51, temperature 98.1 °F (36.7 °C), resp. rate 18, height 5' 9" (1.753 m), weight 77.1 kg (170 lb).    Physical Examination:   GEN: no apparent distress, comfortable; AAOx3  HEAD: atraumatic and normocephalic  EYES: no pallor, no icterus, PERRLA  ENT: OMM, no pharyngeal erythema, external ears WNL; no nasal discharge; no thrush  NECK: no masses, thyroid normal, trachea midline, no LAD/LN's, supple  CV: RRR with no murmur; normal pulse; normal S1 and S2; no pedal edema  CHEST: Normal " respiratory effort; CTAB; normal breath sounds; no wheeze or crackles  ABDOM: nontender and nondistended; soft; normal bowel sounds; no rebound/guarding  MUSC/Skeletal: ROM normal; no crepitus; joints normal; no deformities or arthropathy  EXTREM: no clubbing, cyanosis, inflammation or swelling  SKIN: no rashes, lesions, ulcers, petechiae or subcutaneous nodules  : no florez  NEURO: grossly intact; motor/sensory WNL; AAOx3; no tremors  PSYCH: normal mood, affect and behavior  LYMPH: normal cervical, supraclavicular, axillary and groin LN's      Labs:   Lab Results   Component Value Date    WBC 5.47 09/10/2021    HGB 14.3 09/10/2021    HCT 43.5 09/10/2021    MCV 93 09/10/2021     09/10/2021    CMP  Sodium   Date Value Ref Range Status   04/27/2022 137 136 - 145 mmol/L Final     Potassium   Date Value Ref Range Status   04/27/2022 4.0 3.5 - 5.1 mmol/L Final     Chloride   Date Value Ref Range Status   04/27/2022 102 95 - 110 mmol/L Final     CO2   Date Value Ref Range Status   04/27/2022 29 23 - 29 mmol/L Final     Glucose   Date Value Ref Range Status   04/27/2022 108 70 - 110 mg/dL Final     BUN   Date Value Ref Range Status   04/27/2022 26 (H) 8 - 23 mg/dL Final     Creatinine   Date Value Ref Range Status   04/27/2022 0.9 0.5 - 1.4 mg/dL Final     Calcium   Date Value Ref Range Status   04/27/2022 9.0 8.7 - 10.5 mg/dL Final     Total Protein   Date Value Ref Range Status   09/10/2021 6.8 6.0 - 8.4 g/dL Final     Albumin   Date Value Ref Range Status   09/10/2021 3.9 3.5 - 5.2 g/dL Final     Total Bilirubin   Date Value Ref Range Status   09/10/2021 0.9 0.1 - 1.0 mg/dL Final     Comment:     For infants and newborns, interpretation of results should be based  on gestational age, weight and in agreement with clinical  observations.    Premature Infant recommended reference ranges:  Up to 24 hours.............<8.0 mg/dL  Up to 48 hours............<12.0 mg/dL  3-5 days..................<15.0 mg/dL  6-29  days.................<15.0 mg/dL       Alkaline Phosphatase   Date Value Ref Range Status   09/10/2021 61 55 - 135 U/L Final     AST   Date Value Ref Range Status   09/10/2021 22 10 - 40 U/L Final     ALT   Date Value Ref Range Status   09/10/2021 24 10 - 44 U/L Final     Anion Gap   Date Value Ref Range Status   04/27/2022 6 (L) 8 - 16 mmol/L Final     eGFR if    Date Value Ref Range Status   04/27/2022 >60.0 >60 mL/min/1.73 m^2 Final     eGFR if non    Date Value Ref Range Status   04/27/2022 >60.0 >60 mL/min/1.73 m^2 Final     Comment:     Calculation used to obtain the estimated glomerular filtration  rate (eGFR) is the CKD-EPI equation.            Radiology/Diagnostic Studies:    CT abdom/pelvis  4/2/7/2022:    IMPRESSION: Ovoid sclerotic lesions at L5, left-sided sacrum and left superior pubic ramus most likely representing bone island however metastatic disease cannot be excluded and correlation with bone scan is recommended     Levoscoliosis with multilevel degenerative disc disease and facet hypertrophy     Sigmoid diverticulosis without diverticulitis       Bone scan 4/27/2022:    IMPRESSION: Increased activity in joint central distribution as well as the spine compatible with degenerative changes     No evidence of metastatic disease     The abnormality seen on CT most likely represent bone islands    MRI prostate  5/12/2022:     Impression:     1. PIRADS 5.  Large lesion in the right peripheral zone with clinically significant cancer highly likely to be present.  Bulging of the prostate capsule raises the possibility of extracapsular extension.  2. Lesion is in close proximity to the right neurovascular bundle and also extends into the right seminal vesicles.  3. There are prominent but nonenlarged mesorectal lymph nodes which are indeterminate as to the possibility of metastatic disease and warrant continued attention on follow-up imaging.  4. There is also an  indeterminate sclerotic bone lesion in the left pubic body.  5. Benign prostatic hypertrophy.  Overall Assessment:     PIRADS 1 (clinically significant cancer is highly unlikely to be present)     PIRADS 2 (clinically significant cancer is unlikely to be present)     PIRADS 3 (the presence of clinically significant cancer is equivocal)     PIRADS 4 (clinically significant cancer is likely to be present)     PIRADS 5 (clinically significant cancer is highly likely to be present)     Number of targets created for potential MR/US fusion biopsy     Peripheral zone: 1     Transition zone: 0      All lab results and imaging results have been reviewed and discussed with the patient    Assessment:   (1) 67 y.o. male with diagnosis of prostate cancer who has been referred by Jamie Martin Jr., MD for evaluation by medical hematology/oncology. 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 He was recently diagnosed with prostate cancer after having been found to have an elevated PSA at 23.7. He underwent a transrectal US with biopsy with Dr Walter with  on 4/18/2022.      - Pathology came back showing adenocarcinoma in 5 out of 8 cores with: 80%  RB [place] 4+5 (Group 5), 70% RM [place] 3+4 (Group 3); 30% RA [place] 3 + 4 (Group 2); and 25% LB [place] 3 + 3.   - perineural invasion per path report; [place] 4+5  - He had a CT abdomen pelvis on 4/27 which showed sclerotic lesions at L5, left sacrum and left superior pubic ramus with differential of bone island versus metastatic disease, however, the bone scan on 4/27/2022 was negative.   - Stage IIIC (cT2a N0 M0) per Dr Martin's assessment    - reviewed and discussed the pathology and the latest NCCN guidelines (vers. 4.2022)  - he is considered high/very high risk category given the NCCN criteria  - already started on Eligard ADT about 3 weeks ago  - discussed benefit with addition of abiraterone (Zytiga)  - patient wants to be aggressive with his therapy  - will set up  with chemotherapy school; discuss the side-effect profile of the drugs and provide literature on the drugs      (2) Family Hx/of prostate cancer (dad), breast ca (sister), lymphoma (dad)    (3) Hx/of left rotator cuff/acromioplasty surgery - Spet 2021 with Dr Craven    (4) GERD    (5) History of positive PPD    VISIT DIAGNOSES:              Prostate cancer  -     Ambulatory referral/consult to Hematology / Oncology            Plan:     PLAN:  1. Set up chemotherapy school for the Zytiga; discuss the side-effect profile, provide literature on the drug and obtain consents  2. Proceed with XRT plans; continue ADT (2nd one due Aug 10th 2022)  3. F/u with PCP, , Rad/onc  4. RTC in 4-6 weeks     Fax note to Jamie Martin Jr., MD; Kimberlyn Walter [place], [place]    COVID-19 Discussion:    I had long discussion with patient and any applicable family about the COVID-19 coronavirus epidemic and the recommended precautions with regard to cancer and/or hematology patients. I have re-iterated the CDC recommendations for adequate hand washing, use of hand -like products, and coughing into elbow, etc. In addition, especially for our patients who are on chemotherapy and/or our otherwise immunocompromised patients, I have recommended avoidance of crowds, including movie theaters, restaurants, churches, etc. I have recommended avoidance of any sick or symptomatic family members and/or friends. I have also recommended avoidance of any raw and unwashed food products, and general avoidance of food items that have not been prepared by themselves. The patient has been asked to call us immediately with any symptom developments, issues, questions or other general concerns.     Pathology Discussion:    I reviewed and discussed the pathology report(s) and radiograph reports (if available) in as simple to understand and/or laymen's terms to the best of my ability. I had an indepth conversation with the patient and  "went over the patient's individual diagnosis based on the information that was currently available. I discussed the TNM staging process with regard to the patient's particular cancer type, and the calculated stage based on the currently available TNM data and literature. I discussed the available prognostic data with regard to the current staging information and how it relates to the prognosis of their particular neoplastic process.        NCCN Guidelines:    I discussed the available treatment option(s) in accordance with the latest literature from the NCCN Clinical Practice Guidelines for the patient's particular type of cancer disorder. The NCCN Guidelines provide a "document evidence-based (and) consensus-driven management" of the care of oncology patients. The treatment recommendations were made not only in accordance to the NCCN guidelines, but also factored in to account the patient's overall age, condition, performance status and their medical co-morbidities. I went over the risks and benefits of the the treatment options (if any could be made) with regard to their particular cancer type, their cancer stage, their age, and their co-morbidities.     Chemotherapy Discussion:      I discussed the available treatment option(s) in accordance with the latest/current national evidence-based guidelines (NCCN, UpToDate, NCI, ASCO, etc where applicable), their overall age/condition and their co-morbidities. I also went over the risks and benefits of the chemotherapy with regard to their particular cancer type, their cancer stage, their age/condition, and their co-morbidities. I provided literature on the chemotherapy regimen and discussed the chemotherapy side-effect profiles of the drug(s). I discussed the importance of compliance with obtaining and monitoring weekly lab work, and went over the potential hematopathology issues and risks with anemia, leucopenia and thrombocytopenia that can occur with chemotherapy. I " discussed the potential risks of liver and kidney damage, which could be permanent and could necessitate dialysis long-term if kidney failure developed. I discussed the emetic and/or diarrheal potential of the regimen and the potential need for use of antiemetic and anti-diarrheal medications. I discussed the risk for development of anaphylactic shock, bronchospasm, dysrhythmia, and respiratory/cardiovascular arrest and/or failure. I discussed the potential risks for development of alopecia, cold sensory issues, ringing in ears, vertigo, cataracts, glaucoma, and neuropathy, all of which could end up being chronic and life-long. Some chemotherpyI discussed the risks of hand-foot syndrome and rashes, and development of other autoimmune mediated processes such as pneumonitis, hepatitis, and colitis which could be life threatening. I discussed the risks of the potential development of a rare but fatal viral mediated disease known as PML (Progressive Multifocal Leukoencephalopathy), and risk of future development of leukemia and/or lymphoma from use of certain chemotherapy agents. I discussed the need for neutropenic precautions, basic hygiene/sanitation behaviors and dietary restrictions.    The patient's consent has been obtained to proceed with the chemotherapy.The patient will be referred to Chemotherapy School /[facility] for training and education on chemotherapy, use of antiemetics and/or anti-diarrheals, use of NSAID's, potential chemotherapy side-effects, and any specific recommendations and precautions with the particular chemotherapy agents.      I answered all of the patient's (and family's, if applicable) questions to the best of my ability and to their complete satisfaction. The patient acknowledged full understanding of the risks, recommendations and plan(s).       I have explained and the patient understands all of  the current recommendation(s). I have answered all of their questions to the best  of my ability and to their complete satisfaction.             Thank you for allowing me to participate in this patient's care. Please call with any questions or concerns.    Electronically signed Kip Wright MD

## 2022-05-16 ENCOUNTER — OFFICE VISIT (OUTPATIENT)
Dept: HEMATOLOGY/ONCOLOGY | Facility: CLINIC | Age: 67
End: 2022-05-16
Payer: MEDICARE

## 2022-05-16 VITALS
TEMPERATURE: 98 F | BODY MASS INDEX: 25.18 KG/M2 | HEIGHT: 69 IN | DIASTOLIC BLOOD PRESSURE: 73 MMHG | WEIGHT: 170 LBS | HEART RATE: 51 BPM | RESPIRATION RATE: 18 BRPM | SYSTOLIC BLOOD PRESSURE: 115 MMHG

## 2022-05-16 DIAGNOSIS — C61 PROSTATE CANCER: Primary | ICD-10-CM

## 2022-05-16 PROCEDURE — 99204 OFFICE O/P NEW MOD 45 MIN: CPT | Mod: S$GLB,,, | Performed by: INTERNAL MEDICINE

## 2022-05-16 PROCEDURE — 99204 PR OFFICE/OUTPT VISIT, NEW, LEVL IV, 45-59 MIN: ICD-10-PCS | Mod: S$GLB,,, | Performed by: INTERNAL MEDICINE

## 2022-05-24 ENCOUNTER — OFFICE VISIT (OUTPATIENT)
Dept: HEMATOLOGY/ONCOLOGY | Facility: CLINIC | Age: 67
End: 2022-05-24
Payer: MEDICARE

## 2022-05-24 ENCOUNTER — LAB VISIT (OUTPATIENT)
Dept: LAB | Facility: HOSPITAL | Age: 67
End: 2022-05-24
Attending: INTERNAL MEDICINE
Payer: MEDICARE

## 2022-05-24 VITALS
HEIGHT: 69 IN | SYSTOLIC BLOOD PRESSURE: 134 MMHG | TEMPERATURE: 98 F | DIASTOLIC BLOOD PRESSURE: 83 MMHG | WEIGHT: 167 LBS | HEART RATE: 48 BPM | RESPIRATION RATE: 18 BRPM | BODY MASS INDEX: 24.73 KG/M2

## 2022-05-24 DIAGNOSIS — C61 PROSTATE CANCER: ICD-10-CM

## 2022-05-24 LAB
ALBUMIN SERPL BCP-MCNC: 4.2 G/DL (ref 3.5–5.2)
ALP SERPL-CCNC: 73 U/L (ref 55–135)
ALT SERPL W/O P-5'-P-CCNC: 40 U/L (ref 10–44)
ANION GAP SERPL CALC-SCNC: 9 MMOL/L (ref 8–16)
AST SERPL-CCNC: 33 U/L (ref 10–40)
BASOPHILS # BLD AUTO: 0.04 K/UL (ref 0–0.2)
BASOPHILS NFR BLD: 0.9 % (ref 0–1.9)
BILIRUB SERPL-MCNC: 1 MG/DL (ref 0.1–1)
BUN SERPL-MCNC: 23 MG/DL (ref 8–23)
CALCIUM SERPL-MCNC: 9.7 MG/DL (ref 8.7–10.5)
CHLORIDE SERPL-SCNC: 99 MMOL/L (ref 95–110)
CO2 SERPL-SCNC: 30 MMOL/L (ref 23–29)
CREAT SERPL-MCNC: 1 MG/DL (ref 0.5–1.4)
DIFFERENTIAL METHOD: NORMAL
EOSINOPHIL # BLD AUTO: 0.1 K/UL (ref 0–0.5)
EOSINOPHIL NFR BLD: 2.7 % (ref 0–8)
ERYTHROCYTE [DISTWIDTH] IN BLOOD BY AUTOMATED COUNT: 14 % (ref 11.5–14.5)
EST. GFR  (AFRICAN AMERICAN): >60 ML/MIN/1.73 M^2
EST. GFR  (NON AFRICAN AMERICAN): >60 ML/MIN/1.73 M^2
GLUCOSE SERPL-MCNC: 99 MG/DL (ref 70–110)
HCT VFR BLD AUTO: 44.9 % (ref 40–54)
HGB BLD-MCNC: 15 G/DL (ref 14–18)
IMM GRANULOCYTES # BLD AUTO: 0.01 K/UL (ref 0–0.04)
IMM GRANULOCYTES NFR BLD AUTO: 0.2 % (ref 0–0.5)
LYMPHOCYTES # BLD AUTO: 1.9 K/UL (ref 1–4.8)
LYMPHOCYTES NFR BLD: 43.7 % (ref 18–48)
MCH RBC QN AUTO: 30.5 PG (ref 27–31)
MCHC RBC AUTO-ENTMCNC: 33.4 G/DL (ref 32–36)
MCV RBC AUTO: 91 FL (ref 82–98)
MONOCYTES # BLD AUTO: 0.5 K/UL (ref 0.3–1)
MONOCYTES NFR BLD: 11.6 % (ref 4–15)
NEUTROPHILS # BLD AUTO: 1.8 K/UL (ref 1.8–7.7)
NEUTROPHILS NFR BLD: 40.9 % (ref 38–73)
NRBC BLD-RTO: 0 /100 WBC
PLATELET # BLD AUTO: 174 K/UL (ref 150–450)
PMV BLD AUTO: 11.7 FL (ref 9.2–12.9)
POTASSIUM SERPL-SCNC: 4.3 MMOL/L (ref 3.5–5.1)
PROT SERPL-MCNC: 7 G/DL (ref 6–8.4)
RBC # BLD AUTO: 4.91 M/UL (ref 4.6–6.2)
SODIUM SERPL-SCNC: 138 MMOL/L (ref 136–145)
WBC # BLD AUTO: 4.39 K/UL (ref 3.9–12.7)

## 2022-05-24 PROCEDURE — 85025 COMPLETE CBC W/AUTO DIFF WBC: CPT | Performed by: INTERNAL MEDICINE

## 2022-05-24 PROCEDURE — 36415 COLL VENOUS BLD VENIPUNCTURE: CPT | Performed by: INTERNAL MEDICINE

## 2022-05-24 PROCEDURE — 99215 PR OFFICE/OUTPT VISIT, EST, LEVL V, 40-54 MIN: ICD-10-PCS | Mod: S$GLB,,, | Performed by: NURSE PRACTITIONER

## 2022-05-24 PROCEDURE — 80053 COMPREHEN METABOLIC PANEL: CPT | Performed by: INTERNAL MEDICINE

## 2022-05-24 PROCEDURE — 99215 OFFICE O/P EST HI 40 MIN: CPT | Mod: S$GLB,,, | Performed by: NURSE PRACTITIONER

## 2022-05-24 RX ORDER — PREDNISONE 5 MG/1
5 TABLET ORAL DAILY
Qty: 90 TABLET | Refills: 3 | Status: SHIPPED | OUTPATIENT
Start: 2022-05-24 | End: 2023-06-08

## 2022-05-24 RX ORDER — ABIRATERONE 500 MG/1
1000 TABLET ORAL DAILY
Qty: 60 TABLET | Refills: 11 | Status: SHIPPED | OUTPATIENT
Start: 2022-05-24 | End: 2022-05-27 | Stop reason: SDUPTHER

## 2022-05-24 NOTE — PROGRESS NOTES
[facility] HEMATOLGY ONCOLOGY     Subjective:       Patient ID: Rusty Sen II is a 67 y.o. male presents today for chemotherapy education.      Chief Complaint: Prostate Cancer       HPI  He will be starting treatment with zytiga and prednisone for high risk prostate cancer.     Oncology History   Prostate cancer   5/5/2022 Initial Diagnosis    Prostate cancer     5/6/2022 Cancer Staged    Staging form: Prostate, AJCC 8th Edition  - Clinical: Stage IIIC (cT2a, cN0, cM0, PSA: 23.7, Grade Group: 5)         Past Medical History:   Diagnosis Date    Cancer        Past Surgical History:   Procedure Laterality Date    ARTHROSCOPIC ACROMIOPLASTY OF SHOULDER Left 9/23/2021    Procedure: ACROMIOPLASTY, ARTHROSCOPIC;  Surgeon: Fadi Craven MD;  Location: [facility] OR;  Service: Orthopedics;  Laterality: Left;    ROTATOR CUFF REPAIR Left 9/23/2021    Procedure: REPAIR, ROTATOR CUFF;  Surgeon: Fadi Craven MD;  Location: [facility] OR;  Service: Orthopedics;  Laterality: Left;    SHOULDER ARTHROSCOPY Left 9/23/2021    Procedure: ARTHROSCOPY, SHOULDER;  Surgeon: Fadi Craven MD;  Location: [facility] OR;  Service: Orthopedics;  Laterality: Left;  BEACH CHAIR, RUSTY! , SHOULDER BOOM([facility])       Social History     Socioeconomic History    Marital status:    Tobacco Use    Smoking status: Never Smoker    Smokeless tobacco: Never Used       Family History   Problem Relation Age of Onset    Lymphoma Father     Prostate cancer Father     Breast cancer Sister     Lung cancer Paternal Aunt        Review of patient's allergies indicates:  No Known Allergies      Current Outpatient Medications:     abiraterone (ZYTIGA) 500 mg Tab, Take 1,000 mg by mouth once daily at 6am., Disp: 60 tablet, Rfl: 11    acetaminophen (TYLENOL) 325 MG tablet, Take 325 mg by mouth every 6 (six) hours as needed for Pain., Disp: , Rfl:     leuprolide, 3 month, (ELIGARD, 3 MONTH,) 22.5 mg Syrg syringe, Inject 22.5 mg into the  "skin every 3 (three) months., Disp: 1 each, Rfl: 3    predniSONE (DELTASONE) 5 MG tablet, Take 1 tablet (5 mg total) by mouth once daily., Disp: 90 tablet, Rfl: 3    All medications and past history have been reviewed.    Review of Systems   Constitutional: Negative for activity change, appetite change, fatigue and fever.   HENT: Negative for congestion, mouth sores, postnasal drip, rhinorrhea, sinus pressure, sore throat and trouble swallowing.    Eyes: Negative for photophobia and visual disturbance.   Respiratory: Negative for cough, chest tightness, shortness of breath and wheezing.    Cardiovascular: Negative for chest pain and leg swelling.   Gastrointestinal: Negative for abdominal distention, abdominal pain, constipation, diarrhea, nausea and vomiting.   Endocrine: Negative for cold intolerance.   Genitourinary: Negative for decreased urine volume, dysuria and frequency.   Musculoskeletal: Negative for arthralgias and myalgias.   Skin: Negative for pallor and rash.   Allergic/Immunologic: Negative for immunocompromised state.   Neurological: Negative for dizziness, syncope, weakness, light-headedness, numbness and headaches.   Hematological: Negative for adenopathy. Does not bruise/bleed easily.   Psychiatric/Behavioral: Negative for sleep disturbance. The patient is not nervous/anxious.        Objective:        Physcial Examination  VITAL SIGNS:    Body surface area is 1.92 meters squared.   Pain Assessment  Vitals:    05/24/22 0903   BP: 134/83   Pulse: (Abnormal) 48   Resp: 18   Temp: 98.1 °F (36.7 °C)   Weight: 75.8 kg (167 lb)   Height: 5' 9" (1.753 m)   PainSc: 0-No pain          Wt Readings from Last 5 Encounters:   05/24/22 75.8 kg (167 lb)   05/16/22 77.1 kg (170 lb)   05/06/22 78.3 kg (172 lb 11.2 oz)   05/05/22 78.1 kg (172 lb 2.9 oz)   04/22/22 74.8 kg (165 lb)       Physical Exam  Constitutional:       Appearance: Normal appearance.   HENT:      Head: Normocephalic and atraumatic.      " Mouth/Throat:      Mouth: Mucous membranes are moist.   Cardiovascular:      Rate and Rhythm: Normal rate and regular rhythm.      Pulses: Normal pulses.      Heart sounds: Normal heart sounds.   Pulmonary:      Effort: Pulmonary effort is normal. No respiratory distress.      Breath sounds: Normal breath sounds. No wheezing.   Abdominal:      General: There is no distension.      Palpations: Abdomen is soft. There is no mass.      Tenderness: There is no abdominal tenderness.   Musculoskeletal:         General: No swelling. Normal range of motion.      Right lower leg: No edema.      Left lower leg: No edema.   Skin:     General: Skin is warm and dry.      Capillary Refill: Capillary refill takes 2 to 3 seconds.      Findings: No bruising or rash.   Neurological:      Mental Status: He is alert and oriented to person, place, and time. Mental status is at baseline.      Motor: No weakness.   Psychiatric:         Mood and Affect: Mood normal.         Behavior: Behavior normal.              Laboratory and Radiology   Lab Results   Component Value Date    WBC 4.39 05/24/2022    RBC 4.91 05/24/2022    HGB 15.0 05/24/2022    HCT 44.9 05/24/2022    MCV 91 05/24/2022    MCH 30.5 05/24/2022    MCHC 33.4 05/24/2022    RDW 14.0 05/24/2022     05/24/2022    MPV 11.7 05/24/2022    GRAN 1.8 05/24/2022    GRAN 40.9 05/24/2022    LYMPH 1.9 05/24/2022    LYMPH 43.7 05/24/2022    MONO 0.5 05/24/2022    MONO 11.6 05/24/2022    EOS 0.1 05/24/2022    BASO 0.04 05/24/2022    EOSINOPHIL 2.7 05/24/2022    BASOPHIL 0.9 05/24/2022     BMP  Lab Results   Component Value Date     04/27/2022    K 4.0 04/27/2022     04/27/2022    CO2 29 04/27/2022    BUN 26 (H) 04/27/2022    CREATININE 0.9 04/27/2022    CALCIUM 9.0 04/27/2022    ANIONGAP 6 (L) 04/27/2022    ESTGFRAFRICA >60.0 04/27/2022    EGFRNONAA >60.0 04/27/2022     Lab Results   Component Value Date    ALT 24 09/10/2021    AST 22 09/10/2021    ALKPHOS 61 09/10/2021     BILITOT 0.9 09/10/2021     No results found for this or any previous visit (from the past 2160 hour(s)).  No results found for this or any previous visit (from the past 2160 hour(s)).  Results for orders placed or performed during the hospital encounter of 05/12/22 (from the past 2160 hour(s))   MRI Prostate W W/O Contrast    Impression    1. PIRADS 5.  Large lesion in the right peripheral zone with clinically significant cancer highly likely to be present.  Bulging of the prostate capsule raises the possibility of extracapsular extension.  2. Lesion is in close proximity to the right neurovascular bundle and also extends into the right seminal vesicles.  3. There are prominent but nonenlarged mesorectal lymph nodes which are indeterminate as to the possibility of metastatic disease and warrant continued attention on follow-up imaging.  4. There is also an indeterminate sclerotic bone lesion in the left pubic body.  5. Benign prostatic hypertrophy.  Overall Assessment:    PIRADS 1 (clinically significant cancer is highly unlikely to be present)    PIRADS 2 (clinically significant cancer is unlikely to be present)    PIRADS 3 (the presence of clinically significant cancer is equivocal)    PIRADS 4 (clinically significant cancer is likely to be present)    PIRADS 5 (clinically significant cancer is highly likely to be present)    Number of targets created for potential MR/US fusion biopsy    Peripheral zone: 1    Transition zone: 0      Electronically signed by: Serafin Plata  Date:    05/12/2022  Time:    15:00       Pathology  Pathology Results  (Last 10 years)    None                All lab results and imaging results have been reviewed and discussed with the patient.        TITLE: PLAN OF CARE FOR THE CHEMOTHERAPY PATIENT / TEACHING PROTOCOL    PURPOSE: To involve the patient / significant other in the plan of care and to provide teaching to the significant other & patient receiving  "chemotherapy.    LEVEL: Independent.    CONTENT: The Plan of Care for the chemotherapy patient is individualized and appropriate to the patients needs, strengths, limitations, & goals.  Education includes information regarding chemotherapy side effects, the treatment itself, and self-care  Activities.  Chemocare discussed with patient:   Generic name: Abiraterone acetate    Zytiga® is the trade name for the generic drug abiraterone acetate. In some cases, health care professionals may use the generic name abiraterone acetate when referring to the trade name Zytiga®.    Drug type:    Zytiga® is a type of hormone therapy. This medication is classified as an adrenal inhibitor". (For more detail, see How Zytiga® Works below)    What Zytiga Is Used For:  Zytiga® is indicated for use in combination with prednisone for the treatment of men with metastatic castration-resistant prostate cancer who have already received prior chemotherapy containing docetaxel.  Note: If a drug has been approved for one use, physicians may elect to use this same drug for other problems if they believe it may be helpful.    How Zytiga Is Given:  Zytiga® is a pill, taken by mouth. It is taken once a day.  It must be taken on an empty stomach (take the pill at least 2 hours after a meal, and do not eat for at least 1 hour after taking the pill)  It should be taken at approximately the same time each day.  Prednisone [10mg daily] should be taken along with Zytiga®.  The amount of Zytiga® you receive depends on many factors. Your doctor will determine your dose and schedule  Side Effects:  Important things to remember about the side effects of Zytiga®:    Most people will not experience all of the Zytiga® side effects listed.  Zytiga® side effects are often predictable in terms of their onset, duration, and severity.  Zytiga® side effects are almost always reversible and will go away after therapy is complete.  Zytiga® side effects may be quite " manageable. There are many options to minimize or prevent the side effects of Zytiga®.  The following side effects are common (occurring in greater than 30%) for patients taking Zytiga®:    Fluid Retention  Increased triglycerides  Increased liver enzymes (AST)  These side effects are less common (occurring in 10-29%) side effects for patients receiving Zytiga®:    Joint swelling / discomfort  Decreased levels of potassium in your blood (hypokalemia)  Peripheral Edema (swelling of the hands/feet)  Muscle aches / discomfort  Decreased levels of phosphorus in your blood (hypophosphatemia)  Hot flashes  Diarrhea  Urinary tract infections  Cough  Increased ALT (liver enzyme)  Zytiga® works by inhibiting specific enzymes in your adrenal glands that are responsible for making androgens. For this reason, there is an increased risk of over production of mineral corticosteroids in your body while on Zytiga®. As a result, Zytiga® may cause hypertension [high blood pressure], hypokalemia [low levels of potassium in the blood], and fluid retention. Because of this risk, Zytiga® should be used cautiously in patients with a strong cardiovascular history, and patients should be monitored closely for these symptoms. Dosing along with prednisone will decrease the risk of mineral corticosteroids excess.    Not all side effects are listed above. Side effects that are very rare -- occurring in less than about 10 percent of patients -- are not listed here. But you should always inform your health care provider if you experience any unusual symptoms.    When to contact your doctor or health care provider:  Contact your health care provider immediately, day or night, if you should experience any of the following symptoms:    Fever of 100.4° F (38° C or higher, chills)  Chest Pain or Irregular heart beats  Difficulty breathing  Inability to urinate for 8 or more hours  Always inform your health care provider if you experience any unusual  symptoms.    The following symptoms require medical attention, but are not an emergency. Contact your health care provider within 24 hours of noticing any of the following:    Dizziness / Light headedness  Significant headache  Diarrhea (4-6 episodes in a 24-hour period)  Unusual bleeding or bruising  Black or tarry stools, or blood in your stools  Extreme fatigue (unable to carry on self-care activities)  Yellow coloring of your skin or eyes  Unusual elevation in blood pressure  Always inform your health care provider if you experience any unusual symptoms.    Precautions:  Before starting Zytiga® treatment, make sure you tell your doctor about any other medications you are taking (including prescription, over-the-counter, vitamins, herbal remedies, etc.). Do not take aspirin, products containing aspirin unless your doctor specifically permits this.  Zytiga® is currently not indicated for use in women. However, if Zytiga® is given to a woman, getting pregnant should be avoided and the woman should not breast feed.  Zytiga® is currently not indicated for use in women. Zytiga® should not be given to women who are pregnant or intend to become pregnant. Pregnancy category X (Zytiga® may cause fetal harm when given to a pregnant woman. If a woman becomes pregnant while taking Zytiga®, the medication must be stopped immediately and the woman given appropriate counseling).  It is not known if Zytiga® is excreted in semen, therefore, men should use a condom and another method of birth control during treatment and for 1 week following the last dose of Zytiga®.  Self-Care Tips:  Zytiga® and prednisone are to be used together and neither medication should be interrupted or stopped unless your healthcare provider tells you to. Take the medication exactly as directed.  Take at the same time each day  Take on an empty stomach  You will need to continue receiving your LHRH/GnRH agonists injections (ie Zoladex®, Lupron®,  Trelstar®, etc) throughout treatment with Zytiga®.  If you are experiencing hot flashes, wear light clothing, stay in a cool environment to try and reduce symptoms. Consult your healthcare provider if these worsen or become intolerable.  Get plenty of rest.  Maintain good nutrition.  If you experience symptoms or side effects, be sure to discuss them with your health care team. They can prescribe medications and/or offer other suggestions that are effective in managing such problems.  Monitoring and Testing:  You will be checked regularly by your doctor while you are taking Zytiga®, to monitor side effects and check your response to therapy. Periodic blood work will be obtained to monitor your complete blood count (CBC) as well as the function of other organs (such as your kidneys and liver), as deemed necessary by your doctor.    How Zytiga Works:  Hormones are chemical substances that are produced by glands in the body, which enter the bloodstream and cause effects in other tissues. The use of hormone therapy to treat cancer is based on the observation that receptors for specific hormones that are needed for cell growth are on the surface of some tumor cells. Hormone therapy can work by stopping the production of a certain hormone, blocking hormone receptors or substituting chemically similar agents for the active hormone, which cannot be used by the tumor cell. Different types of hormone therapies are categorized by their function and/or the type of hormone that is affected.    The growth of prostate cancer is stimulated by male hormones (androgens / testosterone). Decreasing the production of these hormones is critical in helping men fight prostate cancer. Androgens are primarily made by the testicles and adrenal glands. However, in men with advanced prostate cancer, the metastatic tumors themselves have the capability to produce testosterone. Generally, prostate cancer responds to treatment that decreases  androgen levels. Many androgen deprivation therapies, decrease androgen production by the testicles but do not affect androgen production elsewhere in the body, such as the adrenal glands or in the tumor.    Zytiga® works in a different manner than other androgen deprivation therapies. Zytiga® interferes with an enzyme that is expressed in testicular, adrenal, and prostatic tumor tissues and is required as part of the bodys androgen producing process. Because of this interference the amount of androgens produced are decreased. Zytiga®, blocks androgen production at three sources; the testes, the adrenal glands, as well as from the tumor itself.        Assessment/Plan:       1. Prostate cancer      Prostate cancer  -     Ambulatory referral/consult to [facility]  -     CBC Auto Differential; Standing  -     CMP; Standing  -     predniSONE (DELTASONE) 5 MG tablet; Take 1 tablet (5 mg total) by mouth once daily.  Dispense: 90 tablet; Refill: 3  -     abiraterone (ZYTIGA) 500 mg Tab; Take 1,000 mg by mouth once daily at 6am.  Dispense: 60 tablet; Refill: 11      Cancer Staging  Prostate cancer  Staging form: Prostate, AJCC 8th Edition  - Clinical: Stage IIIC (cT2a, cN0, cM0, PSA: 23.7, Grade Group: 5) - Signed by Jamie Martin Jr., MD on 5/6/2022        Follow up in about 3 weeks (around 6/14/2022) for with Dr. Wright.     I have explained and the patient understands all of  the current recommendation(s). I have answered all of their questions to the best of my ability and to their complete satisfaction.   The patient is to continue with the current management plan.          Medications Ordered:  Zytiga 1000mg daily   Prednisone 5mg daily     Standing Labs Ordered:  CBC biweekly  CMP biweekly       Total Face to Face Time: 60 minutes face to face with the patient and their family discussing the chemotherapy side-effects and when to call our office.   Electronically signed by: Electronically signed by: Irlanda Frank  Danika, MSN, APRN, AGNP-C

## 2022-05-25 ENCOUNTER — TELEPHONE (OUTPATIENT)
Dept: HEMATOLOGY/ONCOLOGY | Facility: CLINIC | Age: 67
End: 2022-05-25

## 2022-05-25 NOTE — TELEPHONE ENCOUNTER
----- Message from Gail Alcantar sent at 5/24/2022  2:41 PM CDT -----  Karol from [employer] called in reference to the patient's prescription for Abiraterone. She said they need the patient's last chart notes faxed to them. Fax # [phone]

## 2022-05-26 LAB — HEMOCCULT STL QL IA: NEGATIVE

## 2022-05-27 DIAGNOSIS — C61 PROSTATE CANCER: ICD-10-CM

## 2022-05-27 RX ORDER — ABIRATERONE 500 MG/1
1000 TABLET ORAL DAILY
Qty: 60 TABLET | Refills: 11 | Status: SHIPPED | OUTPATIENT
Start: 2022-05-27 | End: 2022-05-27 | Stop reason: SDUPTHER

## 2022-05-27 RX ORDER — ABIRATERONE 500 MG/1
1000 TABLET ORAL DAILY
Qty: 60 TABLET | Refills: 11 | Status: SHIPPED | OUTPATIENT
Start: 2022-05-27 | End: 2022-06-02

## 2022-06-01 ENCOUNTER — TELEPHONE (OUTPATIENT)
Dept: HEMATOLOGY/ONCOLOGY | Facility: CLINIC | Age: 67
End: 2022-06-01

## 2022-06-02 ENCOUNTER — TELEPHONE (OUTPATIENT)
Dept: HEMATOLOGY/ONCOLOGY | Facility: CLINIC | Age: 67
End: 2022-06-02

## 2022-06-02 DIAGNOSIS — C61 PROSTATE CANCER: ICD-10-CM

## 2022-06-02 RX ORDER — ABIRATERONE ACETATE 250 MG/1
1000 TABLET ORAL DAILY
Qty: 120 TABLET | Refills: 11 | Status: SHIPPED | OUTPATIENT
Start: 2022-06-02 | End: 2022-06-06 | Stop reason: SDUPTHER

## 2022-06-02 NOTE — TELEPHONE ENCOUNTER
Patient will pay cash with a good rx coupon for zytiga.     He would like the RX sent to [facility] [address].

## 2022-06-06 ENCOUNTER — TELEPHONE (OUTPATIENT)
Dept: HEMATOLOGY/ONCOLOGY | Facility: CLINIC | Age: 67
End: 2022-06-06

## 2022-06-06 DIAGNOSIS — C61 PROSTATE CANCER: ICD-10-CM

## 2022-06-06 RX ORDER — ABIRATERONE ACETATE 250 MG/1
1000 TABLET ORAL DAILY
Qty: 360 TABLET | Refills: 3 | Status: SHIPPED | OUTPATIENT
Start: 2022-06-06 | End: 2023-05-01 | Stop reason: SDUPTHER

## 2022-06-15 ENCOUNTER — DOCUMENTATION ONLY (OUTPATIENT)
Dept: HEMATOLOGY/ONCOLOGY | Facility: CLINIC | Age: 67
End: 2022-06-15

## 2022-06-15 NOTE — PROGRESS NOTES
NUTRITION NOTE:    Pt request nutrition information regarding diarrhea. He reports one episode of diarrhea in the last week and would like information to prevent/treat if it continues. RD provided Low Residue diet information packet and reveiwed with patient, RD provided contact info and encouraged pt to call or stop by office with any questions or concerns.    Electronically signed by: Shawanda Noyola MBA, DAKOTAN, LDN

## 2022-06-20 ENCOUNTER — LAB VISIT (OUTPATIENT)
Dept: LAB | Facility: HOSPITAL | Age: 67
End: 2022-06-20
Attending: INTERNAL MEDICINE
Payer: MEDICARE

## 2022-06-20 DIAGNOSIS — C61 PROSTATE CANCER: ICD-10-CM

## 2022-06-20 LAB
ALBUMIN SERPL BCP-MCNC: 4 G/DL (ref 3.5–5.2)
ALP SERPL-CCNC: 62 U/L (ref 55–135)
ALT SERPL W/O P-5'-P-CCNC: 59 U/L (ref 10–44)
ANION GAP SERPL CALC-SCNC: 5 MMOL/L (ref 8–16)
AST SERPL-CCNC: 35 U/L (ref 10–40)
BASOPHILS # BLD AUTO: 0.02 K/UL (ref 0–0.2)
BASOPHILS NFR BLD: 0.5 % (ref 0–1.9)
BILIRUB SERPL-MCNC: 0.9 MG/DL (ref 0.1–1)
BUN SERPL-MCNC: 18 MG/DL (ref 8–23)
CALCIUM SERPL-MCNC: 8.5 MG/DL (ref 8.7–10.5)
CHLORIDE SERPL-SCNC: 95 MMOL/L (ref 95–110)
CO2 SERPL-SCNC: 31 MMOL/L (ref 23–29)
CREAT SERPL-MCNC: 0.8 MG/DL (ref 0.5–1.4)
DIFFERENTIAL METHOD: ABNORMAL
EOSINOPHIL # BLD AUTO: 0.1 K/UL (ref 0–0.5)
EOSINOPHIL NFR BLD: 2 % (ref 0–8)
ERYTHROCYTE [DISTWIDTH] IN BLOOD BY AUTOMATED COUNT: 13.6 % (ref 11.5–14.5)
EST. GFR  (AFRICAN AMERICAN): >60 ML/MIN/1.73 M^2
EST. GFR  (NON AFRICAN AMERICAN): >60 ML/MIN/1.73 M^2
GLUCOSE SERPL-MCNC: 113 MG/DL (ref 70–110)
HCT VFR BLD AUTO: 40.5 % (ref 40–54)
HGB BLD-MCNC: 13.7 G/DL (ref 14–18)
IMM GRANULOCYTES # BLD AUTO: 0.02 K/UL (ref 0–0.04)
IMM GRANULOCYTES NFR BLD AUTO: 0.5 % (ref 0–0.5)
LYMPHOCYTES # BLD AUTO: 0.6 K/UL (ref 1–4.8)
LYMPHOCYTES NFR BLD: 14.3 % (ref 18–48)
MCH RBC QN AUTO: 30.7 PG (ref 27–31)
MCHC RBC AUTO-ENTMCNC: 33.8 G/DL (ref 32–36)
MCV RBC AUTO: 91 FL (ref 82–98)
MONOCYTES # BLD AUTO: 0.5 K/UL (ref 0.3–1)
MONOCYTES NFR BLD: 12 % (ref 4–15)
NEUTROPHILS # BLD AUTO: 3.1 K/UL (ref 1.8–7.7)
NEUTROPHILS NFR BLD: 70.7 % (ref 38–73)
NRBC BLD-RTO: 0 /100 WBC
PLATELET # BLD AUTO: 128 K/UL (ref 150–450)
PMV BLD AUTO: 11.1 FL (ref 9.2–12.9)
POTASSIUM SERPL-SCNC: 3.5 MMOL/L (ref 3.5–5.1)
PROT SERPL-MCNC: 6.7 G/DL (ref 6–8.4)
RBC # BLD AUTO: 4.46 M/UL (ref 4.6–6.2)
SODIUM SERPL-SCNC: 131 MMOL/L (ref 136–145)
WBC # BLD AUTO: 4.42 K/UL (ref 3.9–12.7)

## 2022-06-20 PROCEDURE — 85025 COMPLETE CBC W/AUTO DIFF WBC: CPT | Performed by: INTERNAL MEDICINE

## 2022-06-20 PROCEDURE — 36415 COLL VENOUS BLD VENIPUNCTURE: CPT | Performed by: INTERNAL MEDICINE

## 2022-06-20 PROCEDURE — 80053 COMPREHEN METABOLIC PANEL: CPT | Performed by: INTERNAL MEDICINE

## 2022-06-21 NOTE — PROGRESS NOTES
[facility] Hematology/Oncology  PROGRESS NOTE - 2nd Follow-up Visit      Subjective:       Patient ID:   NAME: Rusty Sen II : 1955     67 y.o. male    Referring Doc: Jamie Martin Jr., MD  Other Physicians: Matheus Sofia           Chief Complaint: prostate cancer f/u       History of Present Illness:     Patient returns today for a 2nd regularly scheduled follow-up visit.  The patient is here today to go over the results of the recently ordered labs, tests and studies. He is here by himself     He has been on the XRT and zytiga/ADT. He had some recent labs.     Doing ok except for some mild diarrhea. He is stioll active at gym. He is on week #4 of XRT.    Breathing ok, No CP, SOB, HA's or N/V    Eating ok                 ROS:   GEN: normal without any fever, night sweats or weight loss  HEENT: normal with no HA's, sore throat, stiff neck, changes in vision  CV: normal with no CP, SOB, PND, FAIRCHILD or orthopnea  PULM: normal with no SOB, cough, hemoptysis, sputum or pleuritic pain  GI: normal with no abdominal pain, nausea, vomiting, constipation, diarrhea, melanotic stools, BRBPR, or hematemesis  : normal with no hematuria, dysuria  BREAST: normal with no mass, discharge, pain  SKIN: normal with no rash, erythema, bruising, or swelling    Pain Scale:  0  Allergies:  Review of patient's allergies indicates:  No Known Allergies    Medications:    Current Outpatient Medications:     abiraterone (ZYTIGA) 250 mg Tab, Take 4 tablets (1,000 mg total) by mouth once daily., Disp: 360 tablet, Rfl: 3    acetaminophen (TYLENOL) 325 MG tablet, Take 325 mg by mouth every 6 (six) hours as needed for Pain., Disp: , Rfl:     leuprolide, 3 month, (ELIGARD, 3 MONTH,) 22.5 mg Syrg syringe, Inject 22.5 mg into the skin every 3 (three) months., Disp: 1 each, Rfl: 3    predniSONE (DELTASONE) 5 MG tablet, Take 1 tablet (5 mg total) by mouth once daily., Disp: 90 tablet, Rfl: 3    PMHx/PSHx Updates:  See patient's  last visit with me on 5/16/2022.  See H&P on 5/16/2022        Pathology:  Cancer Staging  Prostate cancer  Staging form: Prostate, AJCC 8th Edition  - Clinical: Stage IIIC (cT2a, cN0, cM0, PSA: 23.7, Grade Group: 5) - Signed by Jamie Martin Jr., MD on 5/6/2022    CT abdom/pelvis  4/2/7/2022:     IMPRESSION: Ovoid sclerotic lesions at L5, left-sided sacrum and left superior pubic ramus most likely representing bone island however metastatic disease cannot be excluded and correlation with bone scan is recommended     Levoscoliosis with multilevel degenerative disc disease and facet hypertrophy     Sigmoid diverticulosis without diverticulitis        Bone scan 4/27/2022:     IMPRESSION: Increased activity in joint central distribution as well as the spine compatible with degenerative changes     No evidence of metastatic disease     The abnormality seen on CT most likely represent bone islands     MRI prostate  5/12/2022:      Impression:     1. PIRADS 5.  Large lesion in the right peripheral zone with clinically significant cancer highly likely to be present.  Bulging of the prostate capsule raises the possibility of extracapsular extension.  2. Lesion is in close proximity to the right neurovascular bundle and also extends into the right seminal vesicles.  3. There are prominent but nonenlarged mesorectal lymph nodes which are indeterminate as to the possibility of metastatic disease and warrant continued attention on follow-up imaging.  4. There is also an indeterminate sclerotic bone lesion in the left pubic body.  5. Benign prostatic hypertrophy.  Overall Assessment:     PIRADS 1 (clinically significant cancer is highly unlikely to be present)     PIRADS 2 (clinically significant cancer is unlikely to be present)     PIRADS 3 (the presence of clinically significant cancer is equivocal)     PIRADS 4 (clinically significant cancer is likely to be present)     PIRADS 5 (clinically significant cancer is highly  "likely to be present)     Number of targets created for potential MR/US fusion biopsy     Peripheral zone: 1     Transition zone: 0           Objective:     Vitals:  Blood pressure (!) 150/117, pulse (!) 59, resp. rate 18, height 5' 9" (1.753 m), weight 75.8 kg (167 lb).    Physical Examination:   GEN: no apparent distress, comfortable; AAOx3  HEAD: atraumatic and normocephalic  EYES: no pallor, no icterus, PERRLA  ENT: OMM, no pharyngeal erythema, external ears WNL; no nasal discharge; no thrush  NECK: no masses, thyroid normal, trachea midline, no LAD/LN's, supple  CV: RRR with no murmur; normal pulse; normal S1 and S2; no pedal edema  CHEST: Normal respiratory effort; CTAB; normal breath sounds; no wheeze or crackles  ABDOM: nontender and nondistended; soft; normal bowel sounds; no rebound/guarding  MUSC/Skeletal: ROM normal; no crepitus; joints normal; no deformities or arthropathy  EXTREM: no clubbing, cyanosis, inflammation or swelling  SKIN: no rashes, lesions, ulcers, petechiae or subcutaneous nodules  : no florez  NEURO: grossly intact; motor/sensory WNL; AAOx3; no tremors  PSYCH: normal mood, affect and behavior  LYMPH: normal cervical, supraclavicular, axillary and groin LN's            Labs:     Lab Results   Component Value Date    WBC 4.42 06/20/2022    HGB 13.7 (L) 06/20/2022    HCT 40.5 06/20/2022    MCV 91 06/20/2022     (L) 06/20/2022       CMP  Sodium   Date Value Ref Range Status   06/20/2022 131 (L) 136 - 145 mmol/L Final     Potassium   Date Value Ref Range Status   06/20/2022 3.5 3.5 - 5.1 mmol/L Final     Chloride   Date Value Ref Range Status   06/20/2022 95 95 - 110 mmol/L Final     CO2   Date Value Ref Range Status   06/20/2022 31 (H) 23 - 29 mmol/L Final     Glucose   Date Value Ref Range Status   06/20/2022 113 (H) 70 - 110 mg/dL Final     BUN   Date Value Ref Range Status   06/20/2022 18 8 - 23 mg/dL Final     Creatinine   Date Value Ref Range Status   06/20/2022 0.8 0.5 - 1.4 " mg/dL Final     Calcium   Date Value Ref Range Status   06/20/2022 8.5 (L) 8.7 - 10.5 mg/dL Final     Total Protein   Date Value Ref Range Status   06/20/2022 6.7 6.0 - 8.4 g/dL Final     Albumin   Date Value Ref Range Status   06/20/2022 4.0 3.5 - 5.2 g/dL Final     Total Bilirubin   Date Value Ref Range Status   06/20/2022 0.9 0.1 - 1.0 mg/dL Final     Comment:     For infants and newborns, interpretation of results should be based  on gestational age, weight and in agreement with clinical  observations.    Premature Infant recommended reference ranges:  Up to 24 hours.............<8.0 mg/dL  Up to 48 hours............<12.0 mg/dL  3-5 days..................<15.0 mg/dL  6-29 days.................<15.0 mg/dL       Alkaline Phosphatase   Date Value Ref Range Status   06/20/2022 62 55 - 135 U/L Final     AST   Date Value Ref Range Status   06/20/2022 35 10 - 40 U/L Final     ALT   Date Value Ref Range Status   06/20/2022 59 (H) 10 - 44 U/L Final     Anion Gap   Date Value Ref Range Status   06/20/2022 5 (L) 8 - 16 mmol/L Final     eGFR if    Date Value Ref Range Status   06/20/2022 >60.0 >60 mL/min/1.73 m^2 Final     eGFR if non    Date Value Ref Range Status   06/20/2022 >60.0 >60 mL/min/1.73 m^2 Final     Comment:     Calculation used to obtain the estimated glomerular filtration  rate (eGFR) is the CKD-EPI equation.              Radiology/Diagnostic Studies:    No results found.    I have reviewed all available lab results and radiology reports.    Assessment/Plan:   (1) 67 y.o. male with diagnosis of prostate cancer who has been referred by Jamie Martin Jr., MD for evaluation by medical hematology/oncology. 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 He was recently diagnosed with prostate cancer after having been found to have an elevated PSA at 23.7. He underwent a transrectal US with biopsy with Dr Walter with  on 4/18/2022.       - Pathology came back showing  adenocarcinoma in 5 out of 8 cores with: 80%  RB Thais 4+5 (Group 5), 70% RM Thais 3+4 (Group 3); 30% RA Triplett 3 + 4 (Group 2); and 25% LB Triplett 3 + 3.   - perineural invasion per path report; Triplett 4+5  - He had a CT abdomen pelvis on 4/27 which showed sclerotic lesions at L5, left sacrum and left superior pubic ramus with differential of bone island versus metastatic disease, however, the bone scan on 4/27/2022 was negative.   - Stage IIIC (cT2a N0 M0) per Dr Martin's assessment     - reviewed and discussed the pathology and the latest NCCN guidelines (vers. 4.2022)  - he is considered high/very high risk category given the NCCN criteria  - already started on Eligard ADT about 3 weeks ago  - discussed benefit with addition of abiraterone (Zytiga)  - patient wants to be aggressive with his therapy  - will set up with chemotherapy school; discuss the side-effect profile of the drugs and provide literature on the drugs    6/22/2022:  - he is now on the abiraterone and XRT with ADT  - he is on week #4 of the XRT  - slight drop in hgb and plats but no unexpected  - some diarrhea but very mild        (2) Family Hx/of prostate cancer (dad), breast ca (sister), lymphoma (dad)     (3) Hx/of left rotator cuff/acromioplasty surgery - Spet 2021 with Dr Craven     (4) GERD     (5) History of positive PPD    VISIT DIAGNOSES:      Prostate cancer          PLAN:  1. continued on zytiga; s/p chemotherapy school for the Zytiga; discuss the side-effect profile, provide literature on the drug and obtain consents  2.continue with XRT plans (on week #4 this week); continue ADT (2nd one due Aug 10th 2022)  3. F/u with PCP, , Rad/onc  4. RTC in 4-6 weeks      Fax note to Jamie Martin Jr., MD; Kimberlyn Walter Keppel, [place]       Discussion:     COVID-19 Discussion:    I had long discussion with patient and any applicable family about the COVID-19 coronavirus epidemic and the recommended precautions with  "regard to cancer and/or hematology patients. I have re-iterated the CDC recommendations for adequate hand washing, use of hand -like products, and coughing into elbow, etc. In addition, especially for our patients who are on chemotherapy and/or our otherwise immunocompromised patients, I have recommended avoidance of crowds, including movie theaters, restaurants, churches, etc. I have recommended avoidance of any sick or symptomatic family members and/or friends. I have also recommended avoidance of any raw and unwashed food products, and general avoidance of food items that have not been prepared by themselves. The patient has been asked to call us immediately with any symptom developments, issues, questions or other general concerns.       Pathology Discussion:    I reviewed and discussed the pathology report(s) and radiograph reports (if available) in as simple to understand and/or laymen's terms to the best of my ability. I had an indepth conversation with the patient and went over the patient's individual diagnosis based on the information that was currently available. I discussed the TNM staging process with regard to the patient's particular cancer type, and the calculated stage based on the currently available TNM data and literature. I discussed the available prognostic data with regard to the current staging information and how it relates to the prognosis of their particular neoplastic process.        NCCN Guidelines:    I discussed the available treatment option(s) in accordance with the latest literature from the NCCN Clinical Practice Guidelines for the patient's particular type of cancer disorder. The NCCN Guidelines provide a "document evidence-based (and) consensus-driven management" of the care of oncology patients. The treatment recommendations were made not only in accordance to the NCCN guidelines, but also factored in to account the patient's overall age, condition, performance status " and their medical co-morbidities. I went over the risks and benefits of the the treatment options (if any could be made) with regard to their particular cancer type, their cancer stage, their age, and their co-morbidities.       Chemotherapy Discussion:      I discussed the available treatment option(s) in accordance with the latest/current national evidence-based guidelines (NCCN, UpToDate, NCI, ASCO, etc where applicable), their overall age/condition and their co-morbidities. I also went over the risks and benefits of the chemotherapy with regard to their particular cancer type, their cancer stage, their age/condition, and their co-morbidities. I provided literature on the chemotherapy regimen and discussed the chemotherapy side-effect profiles of the drug(s). I discussed the importance of compliance with obtaining and monitoring weekly lab work, and went over the potential hematopathology issues and risks with anemia, leucopenia and thrombocytopenia that can occur with chemotherapy. I discussed the potential risks of liver and kidney damage, which could be permanent and could necessitate dialysis long-term if kidney failure developed. I discussed the emetic and/or diarrheal potential of the regimen and the potential need for use of antiemetic and anti-diarrheal medications. I discussed the risk for development of anaphylactic shock, bronchospasm, dysrhythmia, and respiratory/cardiovascular arrest and/or failure. I discussed the potential risks for development of alopecia, cold sensory issues, ringing in ears, vertigo, cataracts, glaucoma, and neuropathy, all of which could end up being chronic and life-long. Some chemotherpyI discussed the risks of hand-foot syndrome and rashes, and development of other autoimmune mediated processes such as pneumonitis, hepatitis, and colitis which could be life threatening. I discussed the risks of the potential development of a rare but fatal viral mediated disease known as PML  (Progressive Multifocal Leukoencephalopathy), and risk of future development of leukemia and/or lymphoma from use of certain chemotherapy agents. I discussed the need for neutropenic precautions, basic hygiene/sanitation behaviors and dietary restrictions.    The patient's consent has been obtained to proceed with the chemotherapy.The patient will be referred to Chemotherapy School [facility] for training and education on chemotherapy, use of antiemetics and/or anti-diarrheals, use of NSAID's, potential chemotherapy side-effects, and any specific recommendations and precautions with the particular chemotherapy agents.      I answered all of the patient's (and family's, if applicable) questions to the best of my ability and to their complete satisfaction. The patient acknowledged full understanding of the risks, recommendations and plan(s).     I spent over 25 mins of time with the patient. Reviewed results of the recently ordered labs, tests and studies; made directives with regards to the results. Over half of this time was spent couseling and coordinating care.    I have explained all of the above in detail and the patient understands all of the current recommendation(s). I have answered all of their questions to the best of my ability and to their complete satisfaction.   The patient is to continue with the current management plan.            Electronically signed by Kip Wright MD

## 2022-06-22 ENCOUNTER — OFFICE VISIT (OUTPATIENT)
Dept: HEMATOLOGY/ONCOLOGY | Facility: CLINIC | Age: 67
End: 2022-06-22
Payer: MEDICARE

## 2022-06-22 VITALS
BODY MASS INDEX: 24.73 KG/M2 | HEART RATE: 59 BPM | RESPIRATION RATE: 18 BRPM | WEIGHT: 167 LBS | SYSTOLIC BLOOD PRESSURE: 150 MMHG | HEIGHT: 69 IN | DIASTOLIC BLOOD PRESSURE: 117 MMHG

## 2022-06-22 DIAGNOSIS — C61 PROSTATE CANCER: Primary | ICD-10-CM

## 2022-06-22 PROCEDURE — 99215 OFFICE O/P EST HI 40 MIN: CPT | Mod: S$GLB,,, | Performed by: INTERNAL MEDICINE

## 2022-06-22 PROCEDURE — 99215 PR OFFICE/OUTPT VISIT, EST, LEVL V, 40-54 MIN: ICD-10-PCS | Mod: S$GLB,,, | Performed by: INTERNAL MEDICINE

## 2022-07-05 ENCOUNTER — TELEPHONE (OUTPATIENT)
Dept: HEMATOLOGY/ONCOLOGY | Facility: CLINIC | Age: 67
End: 2022-07-05

## 2022-07-05 ENCOUNTER — LAB VISIT (OUTPATIENT)
Dept: LAB | Facility: HOSPITAL | Age: 67
End: 2022-07-05
Attending: INTERNAL MEDICINE
Payer: MEDICARE

## 2022-07-05 DIAGNOSIS — C61 PROSTATE CANCER: ICD-10-CM

## 2022-07-05 DIAGNOSIS — C61 PROSTATE CANCER: Primary | ICD-10-CM

## 2022-07-05 LAB
ALBUMIN SERPL BCP-MCNC: 4 G/DL (ref 3.5–5.2)
ALP SERPL-CCNC: 70 U/L (ref 55–135)
ALT SERPL W/O P-5'-P-CCNC: 59 U/L (ref 10–44)
ANION GAP SERPL CALC-SCNC: 6 MMOL/L (ref 8–16)
AST SERPL-CCNC: 35 U/L (ref 10–40)
BASOPHILS # BLD AUTO: 0.03 K/UL (ref 0–0.2)
BASOPHILS NFR BLD: 0.7 % (ref 0–1.9)
BILIRUB SERPL-MCNC: 0.9 MG/DL (ref 0.1–1)
BUN SERPL-MCNC: 13 MG/DL (ref 8–23)
CALCIUM SERPL-MCNC: 8.7 MG/DL (ref 8.7–10.5)
CHLORIDE SERPL-SCNC: 96 MMOL/L (ref 95–110)
CO2 SERPL-SCNC: 31 MMOL/L (ref 23–29)
CREAT SERPL-MCNC: 0.7 MG/DL (ref 0.5–1.4)
DIFFERENTIAL METHOD: ABNORMAL
EOSINOPHIL # BLD AUTO: 0.2 K/UL (ref 0–0.5)
EOSINOPHIL NFR BLD: 4 % (ref 0–8)
ERYTHROCYTE [DISTWIDTH] IN BLOOD BY AUTOMATED COUNT: 14.3 % (ref 11.5–14.5)
EST. GFR  (AFRICAN AMERICAN): >60 ML/MIN/1.73 M^2
EST. GFR  (NON AFRICAN AMERICAN): >60 ML/MIN/1.73 M^2
GLUCOSE SERPL-MCNC: 109 MG/DL (ref 70–110)
HCT VFR BLD AUTO: 42.5 % (ref 40–54)
HGB BLD-MCNC: 14.4 G/DL (ref 14–18)
IMM GRANULOCYTES # BLD AUTO: 0.02 K/UL (ref 0–0.04)
IMM GRANULOCYTES NFR BLD AUTO: 0.5 % (ref 0–0.5)
LYMPHOCYTES # BLD AUTO: 0.5 K/UL (ref 1–4.8)
LYMPHOCYTES NFR BLD: 11.5 % (ref 18–48)
MCH RBC QN AUTO: 30.8 PG (ref 27–31)
MCHC RBC AUTO-ENTMCNC: 33.9 G/DL (ref 32–36)
MCV RBC AUTO: 91 FL (ref 82–98)
MONOCYTES # BLD AUTO: 0.6 K/UL (ref 0.3–1)
MONOCYTES NFR BLD: 14.1 % (ref 4–15)
NEUTROPHILS # BLD AUTO: 3 K/UL (ref 1.8–7.7)
NEUTROPHILS NFR BLD: 69.2 % (ref 38–73)
NRBC BLD-RTO: 0 /100 WBC
PLATELET # BLD AUTO: 167 K/UL (ref 150–450)
PMV BLD AUTO: 11.2 FL (ref 9.2–12.9)
POTASSIUM SERPL-SCNC: 3.8 MMOL/L (ref 3.5–5.1)
PROT SERPL-MCNC: 6.8 G/DL (ref 6–8.4)
RBC # BLD AUTO: 4.67 M/UL (ref 4.6–6.2)
SODIUM SERPL-SCNC: 133 MMOL/L (ref 136–145)
WBC # BLD AUTO: 4.26 K/UL (ref 3.9–12.7)

## 2022-07-05 PROCEDURE — 85025 COMPLETE CBC W/AUTO DIFF WBC: CPT | Performed by: INTERNAL MEDICINE

## 2022-07-05 PROCEDURE — 36415 COLL VENOUS BLD VENIPUNCTURE: CPT | Performed by: INTERNAL MEDICINE

## 2022-07-05 PROCEDURE — 80053 COMPREHEN METABOLIC PANEL: CPT | Performed by: INTERNAL MEDICINE

## 2022-07-05 NOTE — TELEPHONE ENCOUNTER
----- Message from Marcela Mari sent at 7/5/2022  8:31 AM CDT -----  PLEASE CALL PT. HAS QUESTIONS ABOUT HIS LABS.     CB#[phone]

## 2022-07-05 NOTE — TELEPHONE ENCOUNTER
----- Message from Marcela Mari sent at 7/5/2022  8:31 AM CDT -----  PLEASE CALL PT. HAS QUESTIONS ABOUT HIS LABS.     [phone]

## 2022-07-26 DIAGNOSIS — C61 PROSTATE CANCER: Primary | ICD-10-CM

## 2022-07-26 RX ORDER — TAMSULOSIN HYDROCHLORIDE 0.4 MG/1
0.4 CAPSULE ORAL DAILY
Qty: 30 CAPSULE | Refills: 5 | Status: SHIPPED | OUTPATIENT
Start: 2022-07-26 | End: 2022-11-21

## 2022-08-01 ENCOUNTER — TREATMENT (OUTPATIENT)
Dept: RADIATION ONCOLOGY | Facility: CLINIC | Age: 67
End: 2022-08-01
Payer: MEDICARE

## 2022-08-01 ENCOUNTER — LAB VISIT (OUTPATIENT)
Dept: LAB | Facility: HOSPITAL | Age: 67
End: 2022-08-01
Attending: NURSE PRACTITIONER
Payer: MEDICARE

## 2022-08-01 DIAGNOSIS — C61 PROSTATE CANCER: ICD-10-CM

## 2022-08-01 LAB
ALBUMIN SERPL BCP-MCNC: 4 G/DL (ref 3.5–5.2)
ALP SERPL-CCNC: 69 U/L (ref 55–135)
ALT SERPL W/O P-5'-P-CCNC: 56 U/L (ref 10–44)
ANION GAP SERPL CALC-SCNC: 6 MMOL/L (ref 8–16)
AST SERPL-CCNC: 33 U/L (ref 10–40)
BASOPHILS # BLD AUTO: 0.02 K/UL (ref 0–0.2)
BASOPHILS NFR BLD: 0.5 % (ref 0–1.9)
BILIRUB SERPL-MCNC: 1.1 MG/DL (ref 0.1–1)
BUN SERPL-MCNC: 17 MG/DL (ref 8–23)
CALCIUM SERPL-MCNC: 8.9 MG/DL (ref 8.7–10.5)
CHLORIDE SERPL-SCNC: 99 MMOL/L (ref 95–110)
CO2 SERPL-SCNC: 31 MMOL/L (ref 23–29)
COMPLEXED PSA SERPL-MCNC: 0.02 NG/ML (ref 0–4)
CREAT SERPL-MCNC: 0.8 MG/DL (ref 0.5–1.4)
DIFFERENTIAL METHOD: ABNORMAL
EOSINOPHIL # BLD AUTO: 0.1 K/UL (ref 0–0.5)
EOSINOPHIL NFR BLD: 3.5 % (ref 0–8)
ERYTHROCYTE [DISTWIDTH] IN BLOOD BY AUTOMATED COUNT: 14 % (ref 11.5–14.5)
EST. GFR  (NO RACE VARIABLE): >60 ML/MIN/1.73 M^2
GLUCOSE SERPL-MCNC: 107 MG/DL (ref 70–110)
HCT VFR BLD AUTO: 38 % (ref 40–54)
HGB BLD-MCNC: 13.2 G/DL (ref 14–18)
IMM GRANULOCYTES # BLD AUTO: 0.02 K/UL (ref 0–0.04)
IMM GRANULOCYTES NFR BLD AUTO: 0.5 % (ref 0–0.5)
LYMPHOCYTES # BLD AUTO: 0.4 K/UL (ref 1–4.8)
LYMPHOCYTES NFR BLD: 11.8 % (ref 18–48)
MCH RBC QN AUTO: 31.7 PG (ref 27–31)
MCHC RBC AUTO-ENTMCNC: 34.7 G/DL (ref 32–36)
MCV RBC AUTO: 91 FL (ref 82–98)
MONOCYTES # BLD AUTO: 0.5 K/UL (ref 0.3–1)
MONOCYTES NFR BLD: 12.6 % (ref 4–15)
NEUTROPHILS # BLD AUTO: 2.7 K/UL (ref 1.8–7.7)
NEUTROPHILS NFR BLD: 71.1 % (ref 38–73)
NRBC BLD-RTO: 0 /100 WBC
PLATELET # BLD AUTO: 151 K/UL (ref 150–450)
PMV BLD AUTO: 11.5 FL (ref 9.2–12.9)
POTASSIUM SERPL-SCNC: 3.6 MMOL/L (ref 3.5–5.1)
PROT SERPL-MCNC: 6.6 G/DL (ref 6–8.4)
RBC # BLD AUTO: 4.17 M/UL (ref 4.6–6.2)
SODIUM SERPL-SCNC: 136 MMOL/L (ref 136–145)
WBC # BLD AUTO: 3.73 K/UL (ref 3.9–12.7)

## 2022-08-01 PROCEDURE — G6015 RADIATION TX DELIVERY IMRT: HCPCS | Mod: S$GLB,,, | Performed by: RADIOLOGY

## 2022-08-01 PROCEDURE — 80053 COMPREHEN METABOLIC PANEL: CPT | Performed by: NURSE PRACTITIONER

## 2022-08-01 PROCEDURE — 77014 PR  CT GUIDANCE PLACEMENT RAD THERAPY FIELDS: ICD-10-PCS | Mod: S$GLB,,, | Performed by: RADIOLOGY

## 2022-08-01 PROCEDURE — 85025 COMPLETE CBC W/AUTO DIFF WBC: CPT | Performed by: NURSE PRACTITIONER

## 2022-08-01 PROCEDURE — G6015 PR RADN TX DELIVERY,  INTENS MOD, 1+ FIELDS PER TX: ICD-10-PCS | Mod: S$GLB,,, | Performed by: RADIOLOGY

## 2022-08-01 PROCEDURE — 77014 PR  CT GUIDANCE PLACEMENT RAD THERAPY FIELDS: CPT | Mod: S$GLB,,, | Performed by: RADIOLOGY

## 2022-08-01 PROCEDURE — 77336 RADIATION PHYSICS CONSULT: CPT | Mod: S$GLB,,, | Performed by: RADIOLOGY

## 2022-08-01 PROCEDURE — 36415 COLL VENOUS BLD VENIPUNCTURE: CPT | Performed by: NURSE PRACTITIONER

## 2022-08-01 PROCEDURE — 84153 ASSAY OF PSA TOTAL: CPT | Performed by: NURSE PRACTITIONER

## 2022-08-01 PROCEDURE — 77336 PR  RADN PHYSICS CONSULT CONTINUING: ICD-10-PCS | Mod: S$GLB,,, | Performed by: RADIOLOGY

## 2022-08-16 NOTE — PROGRESS NOTES
[facility] Hematology/Oncology  PROGRESS NOTE - Follow-up Visit      Subjective:       Patient ID:   NAME: Rusty Sen II : 1955     67 y.o. male    Referring Doc: Jamie Martin Jr., MD  Other Physicians: Matheus Sofia           Chief Complaint: prostate cancer f/u       History of Present Illness:     Patient returns today for a regularly scheduled follow-up visit.  The patient is here today to go over the results of the recently ordered labs, tests and studies. He is here by himself     He completed XRT about 2-3 weeks ago and his symptoms improved after about 7-10 days; he is continued on zytiga/ADT. He had some recent labs.     He had eligard injection last Wednesday and continues ADT every 3 months    Breathing ok, No CP, SOB, HA's or N/V                     ROS:   GEN: normal without any fever, night sweats or weight loss  HEENT: normal with no HA's, sore throat, stiff neck, changes in vision  CV: normal with no CP, SOB, PND, FAIRCHILD or orthopnea  PULM: normal with no SOB, cough, hemoptysis, sputum or pleuritic pain  GI: normal with no abdominal pain, nausea, vomiting, constipation, diarrhea, melanotic stools, BRBPR, or hematemesis  : normal with no hematuria, dysuria  BREAST: normal with no mass, discharge, pain  SKIN: normal with no rash, erythema, bruising, or swelling    Pain Scale:  0  Allergies:  Review of patient's allergies indicates:  No Known Allergies    Medications:    Current Outpatient Medications:     abiraterone (ZYTIGA) 250 mg Tab, Take 4 tablets (1,000 mg total) by mouth once daily., Disp: 360 tablet, Rfl: 3    acetaminophen (TYLENOL) 325 MG tablet, Take 325 mg by mouth every 6 (six) hours as needed for Pain., Disp: , Rfl:     leuprolide, 3 month, (ELIGARD, 3 MONTH,) 22.5 mg Syrg syringe, Inject 22.5 mg into the skin every 3 (three) months., Disp: 1 each, Rfl: 3    predniSONE (DELTASONE) 5 MG tablet, Take 1 tablet (5 mg total) by mouth once daily., Disp: 90 tablet, Rfl:  3    tamsulosin (FLOMAX) 0.4 mg Cap, Take 1 capsule (0.4 mg total) by mouth once daily., Disp: 30 capsule, Rfl: 5    PMHx/PSHx Updates:  See patient's last visit with me on 6/22/2022.  See H&P on 5/16/2022        Pathology:  Cancer Staging  Prostate cancer  Staging form: Prostate, AJCC 8th Edition  - Clinical: Stage IIIC (cT2a, cN0, cM0, PSA: 23.7, Grade Group: 5) - Signed by Jamie Martin Jr., MD on 5/6/2022    CT abdom/pelvis  4/2/7/2022:     IMPRESSION: Ovoid sclerotic lesions at L5, left-sided sacrum and left superior pubic ramus most likely representing bone island however metastatic disease cannot be excluded and correlation with bone scan is recommended     Levoscoliosis with multilevel degenerative disc disease and facet hypertrophy     Sigmoid diverticulosis without diverticulitis        Bone scan 4/27/2022:     IMPRESSION: Increased activity in joint central distribution as well as the spine compatible with degenerative changes     No evidence of metastatic disease     The abnormality seen on CT most likely represent bone islands     MRI prostate  5/12/2022:      Impression:     1. PIRADS 5.  Large lesion in the right peripheral zone with clinically significant cancer highly likely to be present.  Bulging of the prostate capsule raises the possibility of extracapsular extension.  2. Lesion is in close proximity to the right neurovascular bundle and also extends into the right seminal vesicles.  3. There are prominent but nonenlarged mesorectal lymph nodes which are indeterminate as to the possibility of metastatic disease and warrant continued attention on follow-up imaging.  4. There is also an indeterminate sclerotic bone lesion in the left pubic body.  5. Benign prostatic hypertrophy.  Overall Assessment:     PIRADS 1 (clinically significant cancer is highly unlikely to be present)     PIRADS 2 (clinically significant cancer is unlikely to be present)     PIRADS 3 (the presence of clinically  "significant cancer is equivocal)     PIRADS 4 (clinically significant cancer is likely to be present)     PIRADS 5 (clinically significant cancer is highly likely to be present)     Number of targets created for potential MR/US fusion biopsy     Peripheral zone: 1     Transition zone: 0           Objective:     Vitals:  Blood pressure (!) 146/83, pulse (!) 51, temperature 98 °F (36.7 °C), resp. rate 16, height 5' 9" (1.753 m), weight 76.1 kg (167 lb 12.8 oz).    Physical Examination:   GEN: no apparent distress, comfortable; AAOx3  HEAD: atraumatic and normocephalic  EYES: no pallor, no icterus, PERRLA  ENT: OMM, no pharyngeal erythema, external ears WNL; no nasal discharge; no thrush  NECK: no masses, thyroid normal, trachea midline, no LAD/LN's, supple  CV: RRR with no murmur; normal pulse; normal S1 and S2; no pedal edema  CHEST: Normal respiratory effort; CTAB; normal breath sounds; no wheeze or crackles  ABDOM: nontender and nondistended; soft; normal bowel sounds; no rebound/guarding  MUSC/Skeletal: ROM normal; no crepitus; joints normal; no deformities or arthropathy  EXTREM: no clubbing, cyanosis, inflammation or swelling  SKIN: no rashes, lesions, ulcers, petechiae or subcutaneous nodules  : no florez  NEURO: grossly intact; motor/sensory WNL; AAOx3; no tremors  PSYCH: normal mood, affect and behavior  LYMPH: normal cervical, supraclavicular, axillary and groin LN's            Labs:     Lab Results   Component Value Date    WBC 3.73 (L) 08/01/2022    HGB 13.2 (L) 08/01/2022    HCT 38.0 (L) 08/01/2022    MCV 91 08/01/2022     08/01/2022       CMP  Sodium   Date Value Ref Range Status   08/01/2022 136 136 - 145 mmol/L Final     Potassium   Date Value Ref Range Status   08/01/2022 3.6 3.5 - 5.1 mmol/L Final     Chloride   Date Value Ref Range Status   08/01/2022 99 95 - 110 mmol/L Final     CO2   Date Value Ref Range Status   08/01/2022 31 (H) 23 - 29 mmol/L Final     Glucose   Date Value Ref Range " Status   08/01/2022 107 70 - 110 mg/dL Final     BUN   Date Value Ref Range Status   08/01/2022 17 8 - 23 mg/dL Final     Creatinine   Date Value Ref Range Status   08/01/2022 0.8 0.5 - 1.4 mg/dL Final     Calcium   Date Value Ref Range Status   08/01/2022 8.9 8.7 - 10.5 mg/dL Final     Total Protein   Date Value Ref Range Status   08/01/2022 6.6 6.0 - 8.4 g/dL Final     Albumin   Date Value Ref Range Status   08/01/2022 4.0 3.5 - 5.2 g/dL Final     Total Bilirubin   Date Value Ref Range Status   08/01/2022 1.1 (H) 0.1 - 1.0 mg/dL Final     Comment:     For infants and newborns, interpretation of results should be based  on gestational age, weight and in agreement with clinical  observations.    Premature Infant recommended reference ranges:  Up to 24 hours.............<8.0 mg/dL  Up to 48 hours............<12.0 mg/dL  3-5 days..................<15.0 mg/dL  6-29 days.................<15.0 mg/dL       Alkaline Phosphatase   Date Value Ref Range Status   08/01/2022 69 55 - 135 U/L Final     AST   Date Value Ref Range Status   08/01/2022 33 10 - 40 U/L Final     ALT   Date Value Ref Range Status   08/01/2022 56 (H) 10 - 44 U/L Final     Anion Gap   Date Value Ref Range Status   08/01/2022 6 (L) 8 - 16 mmol/L Final     eGFR if    Date Value Ref Range Status   07/05/2022 >60.0 >60 mL/min/1.73 m^2 Final     eGFR if non    Date Value Ref Range Status   07/05/2022 >60.0 >60 mL/min/1.73 m^2 Final     Comment:     Calculation used to obtain the estimated glomerular filtration  rate (eGFR) is the CKD-EPI equation.        PSA Diagnostic 0.00 - 4.00 ng/mL 0.02            Radiology/Diagnostic Studies:    No results found.    I have reviewed all available lab results and radiology reports.    Assessment/Plan:   (1) 67 y.o. male with diagnosis of prostate cancer who has been referred by Jamie Martin Jr., MD for evaluation by medical hematology/oncology. 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 He  was recently diagnosed with prostate cancer after having been found to have an elevated PSA at 23.7. He underwent a transrectal US with biopsy with Dr Walter with  on 4/18/2022.       - Pathology came back showing adenocarcinoma in 5 out of 8 cores with: 80%  RB [place] 4+5 (Group 5), 70% RM [place] 3+4 (Group 3); 30% RA [place] 3 + 4 (Group 2); and 25% LB [place] 3 + 3.   - perineural invasion per path report; Thais 4+5  - He had a CT abdomen pelvis on 4/27 which showed sclerotic lesions at L5, left sacrum and left superior pubic ramus with differential of bone island versus metastatic disease, however, the bone scan on 4/27/2022 was negative.   - Stage IIIC (cT2a N0 M0) per Dr Martin's assessment     - reviewed and discussed the pathology and the latest NCCN guidelines (vers. 4.2022)  - he is considered high/very high risk category given the NCCN criteria  - already started on Eligard ADT about 3 weeks ago  - discussed benefit with addition of abiraterone (Zytiga)  - patient wants to be aggressive with his therapy  - will set up with chemotherapy school; discuss the side-effect profile of the drugs and provide literature on the drugs    6/22/2022:  - he is now on the abiraterone and XRT with ADT  - he is on week #4 of the XRT  - slight drop in hgb and plats but no unexpected  - some diarrhea but very mild    8/17/2022:  - He completed XRT about 2-3 weeks ago and his symptoms improved after about 7-10 days;  - he is continued on zytiga/ADT. He had some recent labs.   - He had eligard injection last Wednesday and continues ADT every 3 months        (2) Family Hx/of prostate cancer (dad), breast ca (sister), lymphoma (dad)     (3) Hx/of left rotator cuff/acromioplasty surgery - Spet 2021 with Dr Craven     (4) GERD     (5) History of positive PPD    VISIT DIAGNOSES:      Prostate cancer          PLAN:  1. continued on zytiga; s/p chemotherapy school for the Zytiga; discuss the side-effect profile, provide  literature on the drug and obtain consents  2 continue ADT (2nd given on Aug 10th 2022); check labs monthly  3. F/u with PCP, , Rad/onc as directed  4. RTC in 3 months     Fax note to Jamie Martin Jr., MD; Kimberlyn Walter Kepp[place], [place]       Discussion:     COVID-19 Discussion:    I had long discussion with patient and any applicable family about the COVID-19 coronavirus epidemic and the recommended precautions with regard to cancer and/or hematology patients. I have re-iterated the CDC recommendations for adequate hand washing, use of hand -like products, and coughing into elbow, etc. In addition, especially for our patients who are on chemotherapy and/or our otherwise immunocompromised patients, I have recommended avoidance of crowds, including movie theaters, restaurants, churches, etc. I have recommended avoidance of any sick or symptomatic family members and/or friends. I have also recommended avoidance of any raw and unwashed food products, and general avoidance of food items that have not been prepared by themselves. The patient has been asked to call us immediately with any symptom developments, issues, questions or other general concerns.       Pathology Discussion:    I reviewed and discussed the pathology report(s) and radiograph reports (if available) in as simple to understand and/or laymen's terms to the best of my ability. I had an indepth conversation with the patient and went over the patient's individual diagnosis based on the information that was currently available. I discussed the TNM staging process with regard to the patient's particular cancer type, and the calculated stage based on the currently available TNM data and literature. I discussed the available prognostic data with regard to the current staging information and how it relates to the prognosis of their particular neoplastic process.        NCCN Guidelines:    I discussed the available treatment  "option(s) in accordance with the latest literature from the NCCN Clinical Practice Guidelines for the patient's particular type of cancer disorder. The NCCN Guidelines provide a "document evidence-based (and) consensus-driven management" of the care of oncology patients. The treatment recommendations were made not only in accordance to the NCCN guidelines, but also factored in to account the patient's overall age, condition, performance status and their medical co-morbidities. I went over the risks and benefits of the the treatment options (if any could be made) with regard to their particular cancer type, their cancer stage, their age, and their co-morbidities.       Chemotherapy Discussion:      I discussed the available treatment option(s) in accordance with the latest/current national evidence-based guidelines (NCCN, UpToDate, NCI, ASCO, etc where applicable), their overall age/condition and their co-morbidities. I also went over the risks and benefits of the chemotherapy with regard to their particular cancer type, their cancer stage, their age/condition, and their co-morbidities. I provided literature on the chemotherapy regimen and discussed the chemotherapy side-effect profiles of the drug(s). I discussed the importance of compliance with obtaining and monitoring weekly lab work, and went over the potential hematopathology issues and risks with anemia, leucopenia and thrombocytopenia that can occur with chemotherapy. I discussed the potential risks of liver and kidney damage, which could be permanent and could necessitate dialysis long-term if kidney failure developed. I discussed the emetic and/or diarrheal potential of the regimen and the potential need for use of antiemetic and anti-diarrheal medications. I discussed the risk for development of anaphylactic shock, bronchospasm, dysrhythmia, and respiratory/cardiovascular arrest and/or failure. I discussed the potential risks for development of alopecia, " cold sensory issues, ringing in ears, vertigo, cataracts, glaucoma, and neuropathy, all of which could end up being chronic and life-long. Some chemotherpyI discussed the risks of hand-foot syndrome and rashes, and development of other autoimmune mediated processes such as pneumonitis, hepatitis, and colitis which could be life threatening. I discussed the risks of the potential development of a rare but fatal viral mediated disease known as PML (Progressive Multifocal Leukoencephalopathy), and risk of future development of leukemia and/or lymphoma from use of certain chemotherapy agents. I discussed the need for neutropenic precautions, basic hygiene/sanitation behaviors and dietary restrictions.    The patient's consent has been obtained to proceed with the chemotherapy.The patient will be referred to Chemotherapy School /[facility] for training and education on chemotherapy, use of antiemetics and/or anti-diarrheals, use of NSAID's, potential chemotherapy side-effects, and any specific recommendations and precautions with the particular chemotherapy agents.      I answered all of the patient's (and family's, if applicable) questions to the best of my ability and to their complete satisfaction. The patient acknowledged full understanding of the risks, recommendations and plan(s).     I spent over 25 mins of time with the patient. Reviewed results of the recently ordered labs, tests and studies; made directives with regards to the results. Over half of this time was spent couseling and coordinating care.    I have explained all of the above in detail and the patient understands all of the current recommendation(s). I have answered all of their questions to the best of my ability and to their complete satisfaction.   The patient is to continue with the current management plan.            Electronically signed by Kip Wright MD

## 2022-08-17 ENCOUNTER — OFFICE VISIT (OUTPATIENT)
Dept: HEMATOLOGY/ONCOLOGY | Facility: CLINIC | Age: 67
End: 2022-08-17
Payer: MEDICARE

## 2022-08-17 VITALS
WEIGHT: 167.81 LBS | TEMPERATURE: 98 F | HEART RATE: 51 BPM | HEIGHT: 69 IN | BODY MASS INDEX: 24.86 KG/M2 | DIASTOLIC BLOOD PRESSURE: 83 MMHG | RESPIRATION RATE: 16 BRPM | SYSTOLIC BLOOD PRESSURE: 146 MMHG

## 2022-08-17 DIAGNOSIS — C61 PROSTATE CANCER: Primary | ICD-10-CM

## 2022-08-17 DIAGNOSIS — Z12.5 ENCOUNTER FOR SCREENING FOR MALIGNANT NEOPLASM OF PROSTATE: ICD-10-CM

## 2022-08-17 PROCEDURE — 99214 OFFICE O/P EST MOD 30 MIN: CPT | Mod: S$GLB,,, | Performed by: INTERNAL MEDICINE

## 2022-08-17 PROCEDURE — 99214 PR OFFICE/OUTPT VISIT, EST, LEVL IV, 30-39 MIN: ICD-10-PCS | Mod: S$GLB,,, | Performed by: INTERNAL MEDICINE

## 2022-08-23 ENCOUNTER — CLINICAL SUPPORT (OUTPATIENT)
Dept: RADIATION ONCOLOGY | Facility: CLINIC | Age: 67
End: 2022-08-23
Payer: MEDICARE

## 2022-08-23 NOTE — PROGRESS NOTES
DIAGNOSIS: High risk adenocarcinoma of the prostate, [id number] GS 4+5 iPSA 23.7    TREATMENT      Contacted patient today for 3 week checkup.  PSA on 8/1/22 was down to 0.02.  Patient denies any residual GI/ issues, bleeding, pain, wt loss, or other changes/concerns.    A/P  1.  Continue ADT x2-3 yrs; repeat PSA q6m  2.  [facility]  [facility].  Return to clinic in 6 months.  4.  COVID-19 precautions discussed.

## 2022-08-29 ENCOUNTER — LAB VISIT (OUTPATIENT)
Dept: LAB | Facility: HOSPITAL | Age: 67
End: 2022-08-29
Attending: NURSE PRACTITIONER
Payer: MEDICARE

## 2022-08-29 ENCOUNTER — TELEPHONE (OUTPATIENT)
Dept: HEMATOLOGY/ONCOLOGY | Facility: CLINIC | Age: 67
End: 2022-08-29

## 2022-08-29 DIAGNOSIS — Z12.5 ENCOUNTER FOR SCREENING FOR MALIGNANT NEOPLASM OF PROSTATE: ICD-10-CM

## 2022-08-29 DIAGNOSIS — C61 PROSTATE CANCER: ICD-10-CM

## 2022-08-29 DIAGNOSIS — C61 PROSTATE CANCER: Primary | ICD-10-CM

## 2022-08-29 LAB
ALBUMIN SERPL BCP-MCNC: 4 G/DL (ref 3.5–5.2)
ALP SERPL-CCNC: 70 U/L (ref 55–135)
ALT SERPL W/O P-5'-P-CCNC: 34 U/L (ref 10–44)
ANION GAP SERPL CALC-SCNC: 10 MMOL/L (ref 8–16)
AST SERPL-CCNC: 26 U/L (ref 10–40)
BASOPHILS # BLD AUTO: 0.03 K/UL (ref 0–0.2)
BASOPHILS NFR BLD: 1 % (ref 0–1.9)
BILIRUB SERPL-MCNC: 1.1 MG/DL (ref 0.1–1)
BUN SERPL-MCNC: 19 MG/DL (ref 8–23)
CALCIUM SERPL-MCNC: 9.1 MG/DL (ref 8.7–10.5)
CHLORIDE SERPL-SCNC: 102 MMOL/L (ref 95–110)
CO2 SERPL-SCNC: 30 MMOL/L (ref 23–29)
COMPLEXED PSA SERPL-MCNC: <0.01 NG/ML (ref 0–4)
CREAT SERPL-MCNC: 0.8 MG/DL (ref 0.5–1.4)
DIFFERENTIAL METHOD: ABNORMAL
EOSINOPHIL # BLD AUTO: 0.2 K/UL (ref 0–0.5)
EOSINOPHIL NFR BLD: 6.5 % (ref 0–8)
ERYTHROCYTE [DISTWIDTH] IN BLOOD BY AUTOMATED COUNT: 13.9 % (ref 11.5–14.5)
EST. GFR  (NO RACE VARIABLE): >60 ML/MIN/1.73 M^2
GLUCOSE SERPL-MCNC: 102 MG/DL (ref 70–110)
HCT VFR BLD AUTO: 37.4 % (ref 40–54)
HGB BLD-MCNC: 12.7 G/DL (ref 14–18)
IMM GRANULOCYTES # BLD AUTO: 0.01 K/UL (ref 0–0.04)
IMM GRANULOCYTES NFR BLD AUTO: 0.3 % (ref 0–0.5)
LYMPHOCYTES # BLD AUTO: 0.5 K/UL (ref 1–4.8)
LYMPHOCYTES NFR BLD: 16.2 % (ref 18–48)
MCH RBC QN AUTO: 31.7 PG (ref 27–31)
MCHC RBC AUTO-ENTMCNC: 34 G/DL (ref 32–36)
MCV RBC AUTO: 93 FL (ref 82–98)
MONOCYTES # BLD AUTO: 0.4 K/UL (ref 0.3–1)
MONOCYTES NFR BLD: 12 % (ref 4–15)
NEUTROPHILS # BLD AUTO: 2 K/UL (ref 1.8–7.7)
NEUTROPHILS NFR BLD: 64 % (ref 38–73)
NRBC BLD-RTO: 0 /100 WBC
PLATELET # BLD AUTO: 149 K/UL (ref 150–450)
PMV BLD AUTO: 11.3 FL (ref 9.2–12.9)
POTASSIUM SERPL-SCNC: 3.6 MMOL/L (ref 3.5–5.1)
PROT SERPL-MCNC: 6.5 G/DL (ref 6–8.4)
RBC # BLD AUTO: 4.01 M/UL (ref 4.6–6.2)
SODIUM SERPL-SCNC: 142 MMOL/L (ref 136–145)
WBC # BLD AUTO: 3.08 K/UL (ref 3.9–12.7)

## 2022-08-29 PROCEDURE — 36415 COLL VENOUS BLD VENIPUNCTURE: CPT | Performed by: INTERNAL MEDICINE

## 2022-08-29 PROCEDURE — 84153 ASSAY OF PSA TOTAL: CPT | Performed by: INTERNAL MEDICINE

## 2022-08-29 PROCEDURE — 80053 COMPREHEN METABOLIC PANEL: CPT | Performed by: INTERNAL MEDICINE

## 2022-08-29 PROCEDURE — 85025 COMPLETE CBC W/AUTO DIFF WBC: CPT | Performed by: INTERNAL MEDICINE

## 2022-08-30 ENCOUNTER — TELEPHONE (OUTPATIENT)
Dept: HEMATOLOGY/ONCOLOGY | Facility: CLINIC | Age: 67
End: 2022-08-30

## 2022-08-30 NOTE — TELEPHONE ENCOUNTER
----- Message from Kenya Goodrich RN sent at 8/30/2022  9:11 AM CDT -----    ----- Message -----  From: Gail Alcantar  Sent: 8/29/2022   2:26 PM CDT  To: Kyle Shin Staff    The patient would like for Dr. Wright to call him back to discuss his lab results. He said he is concerned about his results. # [phone]

## 2022-09-26 ENCOUNTER — LAB VISIT (OUTPATIENT)
Dept: LAB | Facility: HOSPITAL | Age: 67
End: 2022-09-26
Attending: INTERNAL MEDICINE
Payer: MEDICARE

## 2022-09-26 DIAGNOSIS — C61 PROSTATE CANCER: ICD-10-CM

## 2022-09-26 DIAGNOSIS — Z12.5 ENCOUNTER FOR SCREENING FOR MALIGNANT NEOPLASM OF PROSTATE: ICD-10-CM

## 2022-09-26 LAB
ALBUMIN SERPL BCP-MCNC: 4.1 G/DL (ref 3.5–5.2)
ALP SERPL-CCNC: 80 U/L (ref 55–135)
ALT SERPL W/O P-5'-P-CCNC: 40 U/L (ref 10–44)
ANION GAP SERPL CALC-SCNC: 9 MMOL/L (ref 8–16)
AST SERPL-CCNC: 34 U/L (ref 10–40)
BASOPHILS # BLD AUTO: 0.05 K/UL (ref 0–0.2)
BASOPHILS NFR BLD: 1.3 % (ref 0–1.9)
BILIRUB SERPL-MCNC: 1.2 MG/DL (ref 0.1–1)
BUN SERPL-MCNC: 19 MG/DL (ref 8–23)
CALCIUM SERPL-MCNC: 9.4 MG/DL (ref 8.7–10.5)
CHLORIDE SERPL-SCNC: 102 MMOL/L (ref 95–110)
CO2 SERPL-SCNC: 32 MMOL/L (ref 23–29)
COMPLEXED PSA SERPL-MCNC: <0.01 NG/ML (ref 0–4)
CREAT SERPL-MCNC: 0.9 MG/DL (ref 0.5–1.4)
DIFFERENTIAL METHOD: ABNORMAL
EOSINOPHIL # BLD AUTO: 0.2 K/UL (ref 0–0.5)
EOSINOPHIL NFR BLD: 4.5 % (ref 0–8)
ERYTHROCYTE [DISTWIDTH] IN BLOOD BY AUTOMATED COUNT: 13.2 % (ref 11.5–14.5)
EST. GFR  (NO RACE VARIABLE): >60 ML/MIN/1.73 M^2
GLUCOSE SERPL-MCNC: 108 MG/DL (ref 70–110)
HCT VFR BLD AUTO: 39.8 % (ref 40–54)
HGB BLD-MCNC: 13.6 G/DL (ref 14–18)
IMM GRANULOCYTES # BLD AUTO: 0.01 K/UL (ref 0–0.04)
IMM GRANULOCYTES NFR BLD AUTO: 0.3 % (ref 0–0.5)
LYMPHOCYTES # BLD AUTO: 0.6 K/UL (ref 1–4.8)
LYMPHOCYTES NFR BLD: 16.8 % (ref 18–48)
MCH RBC QN AUTO: 32.2 PG (ref 27–31)
MCHC RBC AUTO-ENTMCNC: 34.2 G/DL (ref 32–36)
MCV RBC AUTO: 94 FL (ref 82–98)
MONOCYTES # BLD AUTO: 0.3 K/UL (ref 0.3–1)
MONOCYTES NFR BLD: 8.5 % (ref 4–15)
NEUTROPHILS # BLD AUTO: 2.6 K/UL (ref 1.8–7.7)
NEUTROPHILS NFR BLD: 68.6 % (ref 38–73)
NRBC BLD-RTO: 0 /100 WBC
PLATELET # BLD AUTO: 153 K/UL (ref 150–450)
PMV BLD AUTO: 11.2 FL (ref 9.2–12.9)
POTASSIUM SERPL-SCNC: 3.5 MMOL/L (ref 3.5–5.1)
PROT SERPL-MCNC: 6.9 G/DL (ref 6–8.4)
RBC # BLD AUTO: 4.23 M/UL (ref 4.6–6.2)
SODIUM SERPL-SCNC: 143 MMOL/L (ref 136–145)
WBC # BLD AUTO: 3.76 K/UL (ref 3.9–12.7)

## 2022-09-26 PROCEDURE — 85025 COMPLETE CBC W/AUTO DIFF WBC: CPT | Performed by: INTERNAL MEDICINE

## 2022-09-26 PROCEDURE — 36415 COLL VENOUS BLD VENIPUNCTURE: CPT | Performed by: INTERNAL MEDICINE

## 2022-09-26 PROCEDURE — 80053 COMPREHEN METABOLIC PANEL: CPT | Performed by: INTERNAL MEDICINE

## 2022-09-26 PROCEDURE — 84153 ASSAY OF PSA TOTAL: CPT | Performed by: INTERNAL MEDICINE

## 2022-10-24 ENCOUNTER — LAB VISIT (OUTPATIENT)
Dept: LAB | Facility: HOSPITAL | Age: 67
End: 2022-10-24
Attending: INTERNAL MEDICINE
Payer: MEDICARE

## 2022-10-24 DIAGNOSIS — C61 PROSTATE CANCER: ICD-10-CM

## 2022-10-24 LAB
ALBUMIN SERPL BCP-MCNC: 4.1 G/DL (ref 3.5–5.2)
ALP SERPL-CCNC: 87 U/L (ref 55–135)
ALT SERPL W/O P-5'-P-CCNC: 34 U/L (ref 10–44)
ANION GAP SERPL CALC-SCNC: 11 MMOL/L (ref 8–16)
AST SERPL-CCNC: 30 U/L (ref 10–40)
BASOPHILS # BLD AUTO: 0.02 K/UL (ref 0–0.2)
BASOPHILS NFR BLD: 0.5 % (ref 0–1.9)
BILIRUB SERPL-MCNC: 1.1 MG/DL (ref 0.1–1)
BUN SERPL-MCNC: 21 MG/DL (ref 8–23)
CALCIUM SERPL-MCNC: 9.1 MG/DL (ref 8.7–10.5)
CHLORIDE SERPL-SCNC: 99 MMOL/L (ref 95–110)
CO2 SERPL-SCNC: 30 MMOL/L (ref 23–29)
COMPLEXED PSA SERPL-MCNC: <0.01 NG/ML (ref 0–4)
CREAT SERPL-MCNC: 1 MG/DL (ref 0.5–1.4)
DIFFERENTIAL METHOD: ABNORMAL
EOSINOPHIL # BLD AUTO: 0.2 K/UL (ref 0–0.5)
EOSINOPHIL NFR BLD: 3.7 % (ref 0–8)
ERYTHROCYTE [DISTWIDTH] IN BLOOD BY AUTOMATED COUNT: 13 % (ref 11.5–14.5)
EST. GFR  (NO RACE VARIABLE): >60 ML/MIN/1.73 M^2
GLUCOSE SERPL-MCNC: 91 MG/DL (ref 70–110)
HCT VFR BLD AUTO: 40.8 % (ref 40–54)
HGB BLD-MCNC: 13.7 G/DL (ref 14–18)
IMM GRANULOCYTES # BLD AUTO: 0.01 K/UL (ref 0–0.04)
IMM GRANULOCYTES NFR BLD AUTO: 0.2 % (ref 0–0.5)
LYMPHOCYTES # BLD AUTO: 0.6 K/UL (ref 1–4.8)
LYMPHOCYTES NFR BLD: 13.3 % (ref 18–48)
MCH RBC QN AUTO: 31.6 PG (ref 27–31)
MCHC RBC AUTO-ENTMCNC: 33.6 G/DL (ref 32–36)
MCV RBC AUTO: 94 FL (ref 82–98)
MONOCYTES # BLD AUTO: 0.4 K/UL (ref 0.3–1)
MONOCYTES NFR BLD: 10 % (ref 4–15)
NEUTROPHILS # BLD AUTO: 3.1 K/UL (ref 1.8–7.7)
NEUTROPHILS NFR BLD: 72.3 % (ref 38–73)
NRBC BLD-RTO: 0 /100 WBC
PLATELET # BLD AUTO: 160 K/UL (ref 150–450)
PMV BLD AUTO: 11.2 FL (ref 9.2–12.9)
POTASSIUM SERPL-SCNC: 3.5 MMOL/L (ref 3.5–5.1)
PROT SERPL-MCNC: 6.8 G/DL (ref 6–8.4)
RBC # BLD AUTO: 4.34 M/UL (ref 4.6–6.2)
SODIUM SERPL-SCNC: 140 MMOL/L (ref 136–145)
WBC # BLD AUTO: 4.29 K/UL (ref 3.9–12.7)

## 2022-10-24 PROCEDURE — 36415 COLL VENOUS BLD VENIPUNCTURE: CPT | Performed by: INTERNAL MEDICINE

## 2022-10-24 PROCEDURE — 80053 COMPREHEN METABOLIC PANEL: CPT | Performed by: INTERNAL MEDICINE

## 2022-10-24 PROCEDURE — 85025 COMPLETE CBC W/AUTO DIFF WBC: CPT | Performed by: INTERNAL MEDICINE

## 2022-10-24 PROCEDURE — 84153 ASSAY OF PSA TOTAL: CPT | Performed by: INTERNAL MEDICINE

## 2022-11-04 ENCOUNTER — TELEPHONE (OUTPATIENT)
Dept: HEMATOLOGY/ONCOLOGY | Facility: CLINIC | Age: 67
End: 2022-11-04

## 2022-11-15 NOTE — PROGRESS NOTES
[facility] Hematology/Oncology  PROGRESS NOTE - Follow-up Visit      Subjective:       Patient ID:   NAME: Rusty Sen II : 1955     67 y.o. male    Referring Doc: Jamie Martin Jr., MD  Other Physicians: Matheus Sofia           Chief Complaint: prostate cancer f/u       History of Present Illness:     Patient returns today for a regularly scheduled follow-up visit.  The patient is here today to go over the results of the recently ordered labs, tests and studies. He is here by himself     He previously had completed XRT and he is continued on zytiga/ADT. He has some fatigue. Recent BP issues and I instructed him to monitor his BP at home and if it continues to run high then f/u with Dr Sofia sooner than 2023    He continues ADT every 3 months    Breathing ok, No CP, SOB, HA's or N/V                     ROS:   GEN: normal without any fever, night sweats or weight loss  HEENT: normal with no HA's, sore throat, stiff neck, changes in vision  CV: normal with no CP, SOB, PND, FAIRCHILD or orthopnea  PULM: normal with no SOB, cough, hemoptysis, sputum or pleuritic pain  GI: normal with no abdominal pain, nausea, vomiting, constipation, diarrhea, melanotic stools, BRBPR, or hematemesis  : normal with no hematuria, dysuria  BREAST: normal with no mass, discharge, pain  SKIN: normal with no rash, erythema, bruising, or swelling    Pain Scale:  0  Allergies:  Review of patient's allergies indicates:  No Known Allergies    Medications:    Current Outpatient Medications:     acetaminophen (TYLENOL) 325 MG tablet, Take 325 mg by mouth every 6 (six) hours as needed for Pain., Disp: , Rfl:     leuprolide, 3 month, (ELIGARD, 3 MONTH,) 22.5 mg Syrg syringe, Inject 22.5 mg into the skin every 3 (three) months., Disp: 1 each, Rfl: 3    predniSONE (DELTASONE) 5 MG tablet, Take 1 tablet (5 mg total) by mouth once daily., Disp: 90 tablet, Rfl: 3    tamsulosin (FLOMAX) 0.4 mg Cap, Take 1 capsule (0.4 mg total) by  mouth once daily., Disp: 30 capsule, Rfl: 5    abiraterone (ZYTIGA) 250 mg Tab, Take 4 tablets (1,000 mg total) by mouth once daily., Disp: 360 tablet, Rfl: 3    PMHx/PSHx Updates:  See patient's last visit with me on 8/17/2022.  See H&P on 5/16/2022        Pathology:   Cancer Staging   Prostate cancer  Staging form: Prostate, AJCC 8th Edition  - Clinical: Stage IIIC (cT2a, cN0, cM0, PSA: 23.7, Grade Group: 5) - Signed by Jamie Martin Jr., MD on 5/6/2022    CT abdom/pelvis  4/2/7/2022:     IMPRESSION: Ovoid sclerotic lesions at L5, left-sided sacrum and left superior pubic ramus most likely representing bone island however metastatic disease cannot be excluded and correlation with bone scan is recommended     Levoscoliosis with multilevel degenerative disc disease and facet hypertrophy     Sigmoid diverticulosis without diverticulitis        Bone scan 4/27/2022:     IMPRESSION: Increased activity in joint central distribution as well as the spine compatible with degenerative changes     No evidence of metastatic disease     The abnormality seen on CT most likely represent bone islands     MRI prostate  5/12/2022:      Impression:     1. PIRADS 5.  Large lesion in the right peripheral zone with clinically significant cancer highly likely to be present.  Bulging of the prostate capsule raises the possibility of extracapsular extension.  2. Lesion is in close proximity to the right neurovascular bundle and also extends into the right seminal vesicles.  3. There are prominent but nonenlarged mesorectal lymph nodes which are indeterminate as to the possibility of metastatic disease and warrant continued attention on follow-up imaging.  4. There is also an indeterminate sclerotic bone lesion in the left pubic body.  5. Benign prostatic hypertrophy.  Overall Assessment:     PIRADS 1 (clinically significant cancer is highly unlikely to be present)     PIRADS 2 (clinically significant cancer is unlikely to be present)    "  PIRADS 3 (the presence of clinically significant cancer is equivocal)     PIRADS 4 (clinically significant cancer is likely to be present)     PIRADS 5 (clinically significant cancer is highly likely to be present)     Number of targets created for potential MR/US fusion biopsy     Peripheral zone: 1     Transition zone: 0           Objective:     Vitals:  Blood pressure (!) 161/74, pulse (!) 55, temperature 97.2 °F (36.2 °C), resp. rate 18, height 5' 9" (1.753 m), weight 76.5 kg (168 lb 11.2 oz).    Physical Examination:   GEN: no apparent distress, comfortable; AAOx3  HEAD: atraumatic and normocephalic  EYES: no pallor, no icterus, PERRLA  ENT: OMM, no pharyngeal erythema, external ears WNL; no nasal discharge; no thrush  NECK: no masses, thyroid normal, trachea midline, no LAD/LN's, supple  CV: RRR with no murmur; normal pulse; normal S1 and S2; no pedal edema  CHEST: Normal respiratory effort; CTAB; normal breath sounds; no wheeze or crackles  ABDOM: nontender and nondistended; soft; normal bowel sounds; no rebound/guarding  MUSC/Skeletal: ROM normal; no crepitus; joints normal; no deformities or arthropathy  EXTREM: no clubbing, cyanosis, inflammation or swelling  SKIN: no rashes, lesions, ulcers, petechiae or subcutaneous nodules  : no florez  NEURO: grossly intact; motor/sensory WNL; AAOx3; no tremors  PSYCH: normal mood, affect and behavior  LYMPH: normal cervical, supraclavicular, axillary and groin LN's            Labs:     Lab Results   Component Value Date    WBC 4.29 10/24/2022    HGB 13.7 (L) 10/24/2022    HCT 40.8 10/24/2022    MCV 94 10/24/2022     10/24/2022       CMP  Sodium   Date Value Ref Range Status   10/24/2022 140 136 - 145 mmol/L Final     Potassium   Date Value Ref Range Status   10/24/2022 3.5 3.5 - 5.1 mmol/L Final     Chloride   Date Value Ref Range Status   10/24/2022 99 95 - 110 mmol/L Final     CO2   Date Value Ref Range Status   10/24/2022 30 (H) 23 - 29 mmol/L Final "     Glucose   Date Value Ref Range Status   10/24/2022 91 70 - 110 mg/dL Final     BUN   Date Value Ref Range Status   10/24/2022 21 8 - 23 mg/dL Final     Creatinine   Date Value Ref Range Status   10/24/2022 1.0 0.5 - 1.4 mg/dL Final     Calcium   Date Value Ref Range Status   10/24/2022 9.1 8.7 - 10.5 mg/dL Final     Total Protein   Date Value Ref Range Status   10/24/2022 6.8 6.0 - 8.4 g/dL Final     Albumin   Date Value Ref Range Status   10/24/2022 4.1 3.5 - 5.2 g/dL Final     Total Bilirubin   Date Value Ref Range Status   10/24/2022 1.1 (H) 0.1 - 1.0 mg/dL Final     Comment:     For infants and newborns, interpretation of results should be based  on gestational age, weight and in agreement with clinical  observations.    Premature Infant recommended reference ranges:  Up to 24 hours.............<8.0 mg/dL  Up to 48 hours............<12.0 mg/dL  3-5 days..................<15.0 mg/dL  6-29 days.................<15.0 mg/dL       Alkaline Phosphatase   Date Value Ref Range Status   10/24/2022 87 55 - 135 U/L Final     AST   Date Value Ref Range Status   10/24/2022 30 10 - 40 U/L Final     ALT   Date Value Ref Range Status   10/24/2022 34 10 - 44 U/L Final     Anion Gap   Date Value Ref Range Status   10/24/2022 11 8 - 16 mmol/L Final     eGFR if    Date Value Ref Range Status   07/05/2022 >60.0 >60 mL/min/1.73 m^2 Final     eGFR if non    Date Value Ref Range Status   07/05/2022 >60.0 >60 mL/min/1.73 m^2 Final     Comment:     Calculation used to obtain the estimated glomerular filtration  rate (eGFR) is the CKD-EPI equation.        PSA Diagnostic 0.00 - 4.00 ng/mL <0.01           Radiology/Diagnostic Studies:    No results found.    I have reviewed all available lab results and radiology reports.    Assessment/Plan:   (1) 67 y.o. male with diagnosis of prostate cancer who has been referred by Jamie Martin Jr., MD for evaluation by medical hematology/oncology. 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 He was recently diagnosed with prostate cancer after having been found to have an elevated PSA at 23.7. He underwent a transrectal US with biopsy with Dr Walter with  on 4/18/2022.       - Pathology came back showing adenocarcinoma in 5 out of 8 cores with: 80%  RB Garland 4+5 (Group 5), 70% RM Thais 3+4 (Group 3); 30% RA Thais 3 + 4 (Group 2); and 25% LB Thais 3 + 3.   - perineural invasion per path report; Thais 4+5  - He had a CT abdomen pelvis on 4/27 which showed sclerotic lesions at L5, left sacrum and left superior pubic ramus with differential of bone island versus metastatic disease, however, the bone scan on 4/27/2022 was negative.   - Stage IIIC (cT2a N0 M0) per Dr Martin's assessment     - reviewed and discussed the pathology and the latest NCCN guidelines (vers. 4.2022)  - he is considered high/very high risk category given the NCCN criteria  - already started on Eligard ADT about 3 weeks ago  - discussed benefit with addition of abiraterone (Zytiga)  - patient wants to be aggressive with his therapy  - will set up with chemotherapy school; discuss the side-effect profile of the drugs and provide literature on the drugs    6/22/2022:  - he is now on the abiraterone and XRT with ADT  - he is on week #4 of the XRT  - slight drop in hgb and plats but no unexpected  - some diarrhea but very mild    8/17/2022:  - He completed XRT about 2-3 weeks ago and his symptoms improved after about 7-10 days;  - he is continued on zytiga/ADT. He had some recent labs.   - He had eligard injection last Wednesday and continues ADT every 3 months    11/16/2022:  - he is continued on the zytiga and ADT  - some fatigue  - some increase in BP -  I instructed him to monitor his BP at home and if it continues to run high then f/u with Dr Sofia sooner than March 2023        (2) Family Hx/of prostate cancer (dad), breast ca (sister), lymphoma (dad)     (3) Hx/of left rotator  cuff/acromioplasty surgery - Spet 2021 with Dr Craven     (4) GERD     (5) History of positive PPD    VISIT DIAGNOSES:      Prostate cancer        PLAN:  1. continued on zytiga; s/p chemotherapy school for the Zytiga; discuss the side-effect profile, provide literature on the drug and obtain consents  2  continue ADT; check labs every 3 months  3. See BP discussion above  4. F/u with PCP, , Rad/onc as directed  5. RTC in 3 months     Fax note to Jamie Martin Jr., MD; Kimberlyn Walter Kepp[place], [place]       Discussion:     COVID-19 Discussion:    I had long discussion with patient and any applicable family about the COVID-19 coronavirus epidemic and the recommended precautions with regard to cancer and/or hematology patients. I have re-iterated the CDC recommendations for adequate hand washing, use of hand -like products, and coughing into elbow, etc. In addition, especially for our patients who are on chemotherapy and/or our otherwise immunocompromised patients, I have recommended avoidance of crowds, including movie theaters, restaurants, churches, etc. I have recommended avoidance of any sick or symptomatic family members and/or friends. I have also recommended avoidance of any raw and unwashed food products, and general avoidance of food items that have not been prepared by themselves. The patient has been asked to call us immediately with any symptom developments, issues, questions or other general concerns.       Pathology Discussion:    I reviewed and discussed the pathology report(s) and radiograph reports (if available) in as simple to understand and/or laymen's terms to the best of my ability. I had an indepth conversation with the patient and went over the patient's individual diagnosis based on the information that was currently available. I discussed the TNM staging process with regard to the patient's particular cancer type, and the calculated stage based on the currently  "available TNM data and literature. I discussed the available prognostic data with regard to the current staging information and how it relates to the prognosis of their particular neoplastic process.        NCCN Guidelines:    I discussed the available treatment option(s) in accordance with the latest literature from the NCCN Clinical Practice Guidelines for the patient's particular type of cancer disorder. The NCCN Guidelines provide a "document evidence-based (and) consensus-driven management" of the care of oncology patients. The treatment recommendations were made not only in accordance to the NCCN guidelines, but also factored in to account the patient's overall age, condition, performance status and their medical co-morbidities. I went over the risks and benefits of the the treatment options (if any could be made) with regard to their particular cancer type, their cancer stage, their age, and their co-morbidities.       Chemotherapy Discussion:      I discussed the available treatment option(s) in accordance with the latest/current national evidence-based guidelines (NCCN, UpToDate, NCI, ASCO, etc where applicable), their overall age/condition and their co-morbidities. I also went over the risks and benefits of the chemotherapy with regard to their particular cancer type, their cancer stage, their age/condition, and their co-morbidities. I provided literature on the chemotherapy regimen and discussed the chemotherapy side-effect profiles of the drug(s). I discussed the importance of compliance with obtaining and monitoring weekly lab work, and went over the potential hematopathology issues and risks with anemia, leucopenia and thrombocytopenia that can occur with chemotherapy. I discussed the potential risks of liver and kidney damage, which could be permanent and could necessitate dialysis long-term if kidney failure developed. I discussed the emetic and/or diarrheal potential of the regimen and the potential " need for use of antiemetic and anti-diarrheal medications. I discussed the risk for development of anaphylactic shock, bronchospasm, dysrhythmia, and respiratory/cardiovascular arrest and/or failure. I discussed the potential risks for development of alopecia, cold sensory issues, ringing in ears, vertigo, cataracts, glaucoma, and neuropathy, all of which could end up being chronic and life-long. Some chemotherpyI discussed the risks of hand-foot syndrome and rashes, and development of other autoimmune mediated processes such as pneumonitis, hepatitis, and colitis which could be life threatening. I discussed the risks of the potential development of a rare but fatal viral mediated disease known as PML (Progressive Multifocal Leukoencephalopathy), and risk of future development of leukemia and/or lymphoma from use of certain chemotherapy agents. I discussed the need for neutropenic precautions, basic hygiene/sanitation behaviors and dietary restrictions.    The patient's consent has been obtained to proceed with the chemotherapy.The patient will be referred to Chemotherapy School /[facility] for training and education on chemotherapy, use of antiemetics and/or anti-diarrheals, use of NSAID's, potential chemotherapy side-effects, and any specific recommendations and precautions with the particular chemotherapy agents.      I answered all of the patient's (and family's, if applicable) questions to the best of my ability and to their complete satisfaction. The patient acknowledged full understanding of the risks, recommendations and plan(s).     I spent over 25 mins of time with the patient. Reviewed results of the recently ordered labs, tests and studies; made directives with regards to the results. Over half of this time was spent couseling and coordinating care.    I have explained all of the above in detail and the patient understands all of the current recommendation(s). I have answered all of their  questions to the best of my ability and to their complete satisfaction.   The patient is to continue with the current management plan.            Electronically signed by Kip Wright MD            Answers submitted by the patient for this visit:  Review of Systems Questionnaire (Submitted on 11/15/2022)  appetite change : No  unexpected weight change: No  mouth sores: No  visual disturbance: No  cough: No  shortness of breath: No  chest pain: No  abdominal pain: No  diarrhea: No  frequency: No  back pain: No  rash: No  headaches: No  adenopathy: No  nervous/ anxious: No

## 2022-11-16 ENCOUNTER — OFFICE VISIT (OUTPATIENT)
Dept: HEMATOLOGY/ONCOLOGY | Facility: CLINIC | Age: 67
End: 2022-11-16
Payer: MEDICARE

## 2022-11-16 VITALS
SYSTOLIC BLOOD PRESSURE: 161 MMHG | TEMPERATURE: 97 F | DIASTOLIC BLOOD PRESSURE: 74 MMHG | HEART RATE: 55 BPM | BODY MASS INDEX: 24.99 KG/M2 | HEIGHT: 69 IN | RESPIRATION RATE: 18 BRPM | WEIGHT: 168.69 LBS

## 2022-11-16 DIAGNOSIS — C61 PROSTATE CANCER: Primary | ICD-10-CM

## 2022-11-16 PROCEDURE — 99214 PR OFFICE/OUTPT VISIT, EST, LEVL IV, 30-39 MIN: ICD-10-PCS | Mod: S$GLB,,, | Performed by: INTERNAL MEDICINE

## 2022-11-16 PROCEDURE — 99214 OFFICE O/P EST MOD 30 MIN: CPT | Mod: S$GLB,,, | Performed by: INTERNAL MEDICINE

## 2022-11-21 ENCOUNTER — CLINICAL SUPPORT (OUTPATIENT)
Dept: FAMILY MEDICINE | Facility: CLINIC | Age: 67
End: 2022-11-21
Payer: MEDICARE

## 2022-11-21 VITALS — SYSTOLIC BLOOD PRESSURE: 164 MMHG | DIASTOLIC BLOOD PRESSURE: 98 MMHG | HEART RATE: 56 BPM

## 2022-11-21 DIAGNOSIS — I10 PRIMARY HYPERTENSION: Primary | ICD-10-CM

## 2022-11-21 DIAGNOSIS — C61 PROSTATE CANCER: ICD-10-CM

## 2022-11-21 DIAGNOSIS — I10 HYPERTENSION, UNSPECIFIED TYPE: Primary | ICD-10-CM

## 2022-11-21 PROCEDURE — 99999 PR PBB SHADOW E&M-EST. PATIENT-LVL I: ICD-10-PCS | Mod: PBBFAC,,,

## 2022-11-21 PROCEDURE — 99211 OFF/OP EST MAY X REQ PHY/QHP: CPT | Mod: PBBFAC,PN

## 2022-11-21 PROCEDURE — 99999 PR PBB SHADOW E&M-EST. PATIENT-LVL I: CPT | Mod: PBBFAC,,,

## 2022-11-21 RX ORDER — RAMIPRIL 5 MG/1
5 CAPSULE ORAL DAILY
Qty: 30 CAPSULE | Refills: 11 | Status: SHIPPED | OUTPATIENT
Start: 2022-11-21 | End: 2022-12-08 | Stop reason: DRUGHIGH

## 2022-12-08 DIAGNOSIS — I15.8 OTHER SECONDARY HYPERTENSION: ICD-10-CM

## 2022-12-08 RX ORDER — RAMIPRIL 10 MG/1
10 CAPSULE ORAL DAILY
Qty: 90 CAPSULE | Refills: 3 | Status: SHIPPED | OUTPATIENT
Start: 2022-12-08 | End: 2023-10-20 | Stop reason: SDUPTHER

## 2022-12-22 ENCOUNTER — PATIENT OUTREACH (OUTPATIENT)
Dept: ADMINISTRATIVE | Facility: HOSPITAL | Age: 67
End: 2022-12-22
Payer: MEDICARE

## 2022-12-22 NOTE — LETTER
AUTHORIZATION FOR RELEASE OF   CONFIDENTIAL INFORMATION    Dr Cornejo    We are seeing Rusty Sen II, date of birth 1955, in the clinic at [facility]. Ancelmo Sofia Jr, MD is the patient's PCP. Rusty Sen II has an outstanding lab/procedure at the time we reviewed his chart. In order to help keep his health information updated, he has authorized us to request the following medical record(s):        (  )  MAMMOGRAM                                      (  X)  COLONOSCOPY      (  )  PAP SMEAR                                          (  )  OUTSIDE LAB RESULTS     (  )  DEXA SCAN                                          (  )  EYE EXAM            (  )  FOOT EXAM                                          (  )  ENTIRE RECORD     (  )  OUTSIDE IMMUNIZATIONS                 (  )  _______________         Please fax records to [facility], James H Newcomb Jr, MD, [phone]    Thank you in advance,       Deysi NAIDU  Care Coordinator  [facility]  [address] [place] [id number]  Phone [phone]  Fax [phone]           Patient Name: Rusty Sen II  : 1955  Patient Phone #: [phone]

## 2022-12-30 DIAGNOSIS — I15.8 OTHER SECONDARY HYPERTENSION: Primary | ICD-10-CM

## 2022-12-30 RX ORDER — AMLODIPINE BESYLATE 5 MG/1
5 TABLET ORAL DAILY
Qty: 30 TABLET | Refills: 11 | Status: SHIPPED | OUTPATIENT
Start: 2022-12-30 | End: 2023-01-25 | Stop reason: SDUPTHER

## 2023-01-04 LAB — CRC RECOMMENDATION EXT: NORMAL

## 2023-01-05 ENCOUNTER — PATIENT OUTREACH (OUTPATIENT)
Dept: ADMINISTRATIVE | Facility: HOSPITAL | Age: 68
End: 2023-01-05
Payer: MEDICARE

## 2023-01-25 DIAGNOSIS — I15.8 OTHER SECONDARY HYPERTENSION: Primary | ICD-10-CM

## 2023-01-25 RX ORDER — AMLODIPINE BESYLATE 5 MG/1
5 TABLET ORAL DAILY
Qty: 90 TABLET | Refills: 3 | Status: SHIPPED | OUTPATIENT
Start: 2023-01-25 | End: 2023-10-20 | Stop reason: SDUPTHER

## 2023-02-13 ENCOUNTER — TELEPHONE (OUTPATIENT)
Dept: HEMATOLOGY/ONCOLOGY | Facility: CLINIC | Age: 68
End: 2023-02-13

## 2023-02-14 ENCOUNTER — LAB VISIT (OUTPATIENT)
Dept: LAB | Facility: HOSPITAL | Age: 68
End: 2023-02-14
Attending: INTERNAL MEDICINE
Payer: MEDICARE

## 2023-02-14 DIAGNOSIS — C61 PROSTATE CANCER: ICD-10-CM

## 2023-02-14 DIAGNOSIS — Z12.5 ENCOUNTER FOR SCREENING FOR MALIGNANT NEOPLASM OF PROSTATE: ICD-10-CM

## 2023-02-14 LAB
ALBUMIN SERPL BCP-MCNC: 4 G/DL (ref 3.5–5.2)
ALP SERPL-CCNC: 89 U/L (ref 55–135)
ALT SERPL W/O P-5'-P-CCNC: 24 U/L (ref 10–44)
ANION GAP SERPL CALC-SCNC: 7 MMOL/L (ref 8–16)
AST SERPL-CCNC: 23 U/L (ref 10–40)
BASOPHILS # BLD AUTO: 0.02 K/UL (ref 0–0.2)
BASOPHILS NFR BLD: 0.4 % (ref 0–1.9)
BILIRUB SERPL-MCNC: 0.6 MG/DL (ref 0.1–1)
BUN SERPL-MCNC: 21 MG/DL (ref 8–23)
CALCIUM SERPL-MCNC: 9.4 MG/DL (ref 8.7–10.5)
CHLORIDE SERPL-SCNC: 101 MMOL/L (ref 95–110)
CO2 SERPL-SCNC: 27 MMOL/L (ref 23–29)
COMPLEXED PSA SERPL-MCNC: <0.01 NG/ML (ref 0–4)
COMPLEXED PSA SERPL-MCNC: <0.01 NG/ML (ref 0–4)
CREAT SERPL-MCNC: 0.9 MG/DL (ref 0.5–1.4)
DIFFERENTIAL METHOD: ABNORMAL
EOSINOPHIL # BLD AUTO: 0.1 K/UL (ref 0–0.5)
EOSINOPHIL NFR BLD: 1.1 % (ref 0–8)
ERYTHROCYTE [DISTWIDTH] IN BLOOD BY AUTOMATED COUNT: 13.4 % (ref 11.5–14.5)
EST. GFR  (NO RACE VARIABLE): >60 ML/MIN/1.73 M^2
GLUCOSE SERPL-MCNC: 132 MG/DL (ref 70–110)
HCT VFR BLD AUTO: 38.7 % (ref 40–54)
HGB BLD-MCNC: 12.8 G/DL (ref 14–18)
IMM GRANULOCYTES # BLD AUTO: 0.01 K/UL (ref 0–0.04)
IMM GRANULOCYTES NFR BLD AUTO: 0.2 % (ref 0–0.5)
LYMPHOCYTES # BLD AUTO: 0.9 K/UL (ref 1–4.8)
LYMPHOCYTES NFR BLD: 18.9 % (ref 18–48)
MCH RBC QN AUTO: 31.5 PG (ref 27–31)
MCHC RBC AUTO-ENTMCNC: 33.1 G/DL (ref 32–36)
MCV RBC AUTO: 95 FL (ref 82–98)
MONOCYTES # BLD AUTO: 0.4 K/UL (ref 0.3–1)
MONOCYTES NFR BLD: 8.6 % (ref 4–15)
NEUTROPHILS # BLD AUTO: 3.2 K/UL (ref 1.8–7.7)
NEUTROPHILS NFR BLD: 70.8 % (ref 38–73)
NRBC BLD-RTO: 0 /100 WBC
PLATELET # BLD AUTO: 197 K/UL (ref 150–450)
PMV BLD AUTO: 11 FL (ref 9.2–12.9)
POTASSIUM SERPL-SCNC: 3.7 MMOL/L (ref 3.5–5.1)
PROT SERPL-MCNC: 7.1 G/DL (ref 6–8.4)
RBC # BLD AUTO: 4.06 M/UL (ref 4.6–6.2)
SODIUM SERPL-SCNC: 135 MMOL/L (ref 136–145)
WBC # BLD AUTO: 4.55 K/UL (ref 3.9–12.7)

## 2023-02-14 PROCEDURE — 84153 ASSAY OF PSA TOTAL: CPT | Mod: 91 | Performed by: INTERNAL MEDICINE

## 2023-02-14 PROCEDURE — 36415 COLL VENOUS BLD VENIPUNCTURE: CPT | Performed by: INTERNAL MEDICINE

## 2023-02-14 PROCEDURE — 80053 COMPREHEN METABOLIC PANEL: CPT | Performed by: INTERNAL MEDICINE

## 2023-02-14 PROCEDURE — 85025 COMPLETE CBC W/AUTO DIFF WBC: CPT | Performed by: INTERNAL MEDICINE

## 2023-02-14 PROCEDURE — 84153 ASSAY OF PSA TOTAL: CPT | Performed by: INTERNAL MEDICINE

## 2023-02-15 NOTE — PROGRESS NOTES
[facility] Hematology/Oncology  PROGRESS NOTE - Follow-up Visit      Subjective:       Patient ID:   NAME: Rusty Sen II : 1955     67 y.o. male    Referring Doc: Jamie Martin Jr., MD  Other Physicians: Matheus Sofia           Chief Complaint: prostate cancer f/u       History of Present Illness:     Patient returns today for a regularly scheduled follow-up visit.  The patient is here today to go over the results of the recently ordered labs, tests and studies. He is here by himself     He previously had completed XRT and he is continued on zytiga/ADT. He has some fatigue.     He continues ADT every 3 months    He had recent TB Gold test and is due for CXR and discussed Dr Sofia and is on amlodipine and ramipril and BP issues are better    Breathing ok, No CP, SOB, HA's or N/V; recent upper respiratory infection but recovered; mild residual nonproductive cough                     ROS:   GEN: normal without any fever, night sweats or weight loss  HEENT: normal with no HA's, sore throat, stiff neck, changes in vision  CV: normal with no CP, SOB, PND, FAIRCHILD or orthopnea  PULM: normal with no SOB, cough, hemoptysis, sputum or pleuritic pain  GI: normal with no abdominal pain, nausea, vomiting, constipation, diarrhea, melanotic stools, BRBPR, or hematemesis  : normal with no hematuria, dysuria  BREAST: normal with no mass, discharge, pain  SKIN: normal with no rash, erythema, bruising, or swelling    Pain Scale:  0  Allergies:  Review of patient's allergies indicates:  No Known Allergies    Medications:    Current Outpatient Medications:     acetaminophen (TYLENOL) 325 MG tablet, Take 325 mg by mouth every 6 (six) hours as needed for Pain., Disp: , Rfl:     amLODIPine (NORVASC) 5 MG tablet, Take 1 tablet (5 mg total) by mouth once daily., Disp: 90 tablet, Rfl: 3    leuprolide, 3 month, (ELIGARD, 3 MONTH,) 22.5 mg Syrg syringe, Inject 22.5 mg into the skin every 3 (three) months., Disp: 1 each,  Rfl: 3    predniSONE (DELTASONE) 5 MG tablet, Take 1 tablet (5 mg total) by mouth once daily., Disp: 90 tablet, Rfl: 3    ramipriL (ALTACE) 10 MG capsule, Take 1 capsule (10 mg total) by mouth once daily., Disp: 90 capsule, Rfl: 3    abiraterone (ZYTIGA) 250 mg Tab, Take 4 tablets (1,000 mg total) by mouth once daily., Disp: 360 tablet, Rfl: 3    PMHx/PSHx Updates:  See patient's last visit with me on  11/16/2022.  See H&P on 5/16/2022        Pathology:   Cancer Staging   Prostate cancer  Staging form: Prostate, AJCC 8th Edition  - Clinical: Stage IIIC (cT2a, cN0, cM0, PSA: 23.7, Grade Group: 5) - Signed by Jamie Martin Jr., MD on 5/6/2022      CT abdom/pelvis  4/2/7/2022:     IMPRESSION: Ovoid sclerotic lesions at L5, left-sided sacrum and left superior pubic ramus most likely representing bone island however metastatic disease cannot be excluded and correlation with bone scan is recommended     Levoscoliosis with multilevel degenerative disc disease and facet hypertrophy     Sigmoid diverticulosis without diverticulitis        Bone scan 4/27/2022:     IMPRESSION: Increased activity in joint central distribution as well as the spine compatible with degenerative changes     No evidence of metastatic disease     The abnormality seen on CT most likely represent bone islands     MRI prostate  5/12/2022:      Impression:     1. PIRADS 5.  Large lesion in the right peripheral zone with clinically significant cancer highly likely to be present.  Bulging of the prostate capsule raises the possibility of extracapsular extension.  2. Lesion is in close proximity to the right neurovascular bundle and also extends into the right seminal vesicles.  3. There are prominent but nonenlarged mesorectal lymph nodes which are indeterminate as to the possibility of metastatic disease and warrant continued attention on follow-up imaging.  4. There is also an indeterminate sclerotic bone lesion in the left pubic body.  5. Benign  prostatic hypertrophy.  Overall Assessment:     PIRADS 1 (clinically significant cancer is highly unlikely to be present)     PIRADS 2 (clinically significant cancer is unlikely to be present)     PIRADS 3 (the presence of clinically significant cancer is equivocal)     PIRADS 4 (clinically significant cancer is likely to be present)     PIRADS 5 (clinically significant cancer is highly likely to be present)     Number of targets created for potential MR/US fusion biopsy     Peripheral zone: 1     Transition zone: 0           Objective:     Vitals:  Blood pressure (!) 109/58, pulse 62, temperature 98 °F (36.7 °C), resp. rate 18, weight 76.3 kg (168 lb 4.8 oz).    Physical Examination:   GEN: no apparent distress, comfortable; AAOx3  HEAD: atraumatic and normocephalic  EYES: no pallor, no icterus, PERRLA  ENT: OMM, no pharyngeal erythema, external ears WNL; no nasal discharge; no thrush  NECK: no masses, thyroid normal, trachea midline, no LAD/LN's, supple  CV: RRR with no murmur; normal pulse; normal S1 and S2; no pedal edema  CHEST: Normal respiratory effort; CTAB; normal breath sounds; no wheeze or crackles  ABDOM: nontender and nondistended; soft; normal bowel sounds; no rebound/guarding  MUSC/Skeletal: ROM normal; no crepitus; joints normal; no deformities or arthropathy  EXTREM: no clubbing, cyanosis, inflammation or swelling  SKIN: no rashes, lesions, ulcers, petechiae or subcutaneous nodules  : no florez  NEURO: grossly intact; motor/sensory WNL; AAOx3; no tremors  PSYCH: normal mood, affect and behavior  LYMPH: normal cervical, supraclavicular, axillary and groin LN's            Labs:     Lab Results   Component Value Date    WBC 4.55 02/14/2023    HGB 12.8 (L) 02/14/2023    HCT 38.7 (L) 02/14/2023    MCV 95 02/14/2023     02/14/2023       CMP  Sodium   Date Value Ref Range Status   02/14/2023 135 (L) 136 - 145 mmol/L Final     Potassium   Date Value Ref Range Status   02/14/2023 3.7 3.5 - 5.1 mmol/L  Final     Chloride   Date Value Ref Range Status   02/14/2023 101 95 - 110 mmol/L Final     CO2   Date Value Ref Range Status   02/14/2023 27 23 - 29 mmol/L Final     Glucose   Date Value Ref Range Status   02/14/2023 132 (H) 70 - 110 mg/dL Final     BUN   Date Value Ref Range Status   02/14/2023 21 8 - 23 mg/dL Final     Creatinine   Date Value Ref Range Status   02/14/2023 0.9 0.5 - 1.4 mg/dL Final     Calcium   Date Value Ref Range Status   02/14/2023 9.4 8.7 - 10.5 mg/dL Final     Total Protein   Date Value Ref Range Status   02/14/2023 7.1 6.0 - 8.4 g/dL Final     Albumin   Date Value Ref Range Status   02/14/2023 4.0 3.5 - 5.2 g/dL Final     Total Bilirubin   Date Value Ref Range Status   02/14/2023 0.6 0.1 - 1.0 mg/dL Final     Comment:     For infants and newborns, interpretation of results should be based  on gestational age, weight and in agreement with clinical  observations.    Premature Infant recommended reference ranges:  Up to 24 hours.............<8.0 mg/dL  Up to 48 hours............<12.0 mg/dL  3-5 days..................<15.0 mg/dL  6-29 days.................<15.0 mg/dL       Alkaline Phosphatase   Date Value Ref Range Status   02/14/2023 89 55 - 135 U/L Final     AST   Date Value Ref Range Status   02/14/2023 23 10 - 40 U/L Final     ALT   Date Value Ref Range Status   02/14/2023 24 10 - 44 U/L Final     Anion Gap   Date Value Ref Range Status   02/14/2023 7 (L) 8 - 16 mmol/L Final     eGFR if    Date Value Ref Range Status   07/05/2022 >60.0 >60 mL/min/1.73 m^2 Final     eGFR if non    Date Value Ref Range Status   07/05/2022 >60.0 >60 mL/min/1.73 m^2 Final     Comment:     Calculation used to obtain the estimated glomerular filtration  rate (eGFR) is the CKD-EPI equation.           PSA Diagnostic 0.00 - 4.00 ng/mL <0.01          Radiology/Diagnostic Studies:    No results found.    I have reviewed all available lab results and radiology  reports.    Assessment/Plan:   (1) 67 y.o. male with diagnosis of prostate cancer who has been referred by Jamie Martin Jr., MD for evaluation by medical hematology/oncology. 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 He was recently diagnosed with prostate cancer after having been found to have an elevated PSA at 23.7. He underwent a transrectal US with biopsy with Dr Walter with  on 4/18/2022.       - Pathology came back showing adenocarcinoma in 5 out of 8 cores with: 80%  RB [place] 4+5 (Group 5), 70% RM [place] 3+4 (Group 3); 30% RA [place] 3 + 4 (Group 2); and 25% LB [place] 3 + 3.   - perineural invasion per path report; [place] 4+5  - He had a CT abdomen pelvis on 4/27 which showed sclerotic lesions at L5, left sacrum and left superior pubic ramus with differential of bone island versus metastatic disease, however, the bone scan on 4/27/2022 was negative.   - Stage IIIC (cT2a N0 M0) per Dr Martin's assessment     - reviewed and discussed the pathology and the latest NCCN guidelines (vers. 4.2022)  - he is considered high/very high risk category given the NCCN criteria  - already started on Eligard ADT about 3 weeks ago  - discussed benefit with addition of abiraterone (Zytiga)  - patient wants to be aggressive with his therapy  - will set up with chemotherapy school; discuss the side-effect profile of the drugs and provide literature on the drugs    6/22/2022:  - he is now on the abiraterone and XRT with ADT  - he is on week #4 of the XRT  - slight drop in hgb and plats but no unexpected  - some diarrhea but very mild    8/17/2022:  - He completed XRT about 2-3 weeks ago and his symptoms improved after about 7-10 days;  - he is continued on zytiga/ADT. He had some recent labs.   - He had eligard injection last Wednesday and continues ADT every 3 months    11/16/2022:  - he is continued on the zytiga and ADT  - some fatigue  - some increase in BP -  I instructed him to monitor his BP at home and if it  continues to run high then f/u with Dr Sofia sooner than March 2023 2/16/2023:  - continued on zytiga/pred and ADT  - PSA remains <0.01        (2) Family Hx/of prostate cancer (dad), breast ca (sister), lymphoma (dad)     (3) Hx/of left rotator cuff/acromioplasty surgery - Spet 2021 with Dr Craven     (4) GERD - prior colonoscopy with Dr Cornejo with GI in Jan 2023     (5) History of positive PPD - recent TB Gold positive - will get CXR - Dr Sofia is aware    VISIT DIAGNOSES:      Prostate cancer          PLAN:  1. continued on zytiga; s/p chemotherapy school for the Zytiga; discuss the side-effect profile, provided literature on the drug and obtain consents  2  continue ADT; check labs every 3 months - ADT due in march 2023  3. BP Management as per primary  4. F/u with PCP, , Rad/onc as directed  5. Check CXR  6. RTC in 3 months     Fax note to Jamie Martin Jr., MD; Kimberlyn Walter [place], [place]       Discussion:     COVID-19 Discussion:    I had long discussion with patient and any applicable family about the COVID-19 coronavirus epidemic and the recommended precautions with regard to cancer and/or hematology patients. I have re-iterated the CDC recommendations for adequate hand washing, use of hand -like products, and coughing into elbow, etc. In addition, especially for our patients who are on chemotherapy and/or our otherwise immunocompromised patients, I have recommended avoidance of crowds, including movie theaters, restaurants, churches, etc. I have recommended avoidance of any sick or symptomatic family members and/or friends. I have also recommended avoidance of any raw and unwashed food products, and general avoidance of food items that have not been prepared by themselves. The patient has been asked to call us immediately with any symptom developments, issues, questions or other general concerns.       Pathology Discussion:    I reviewed and discussed the pathology  "report(s) and radiograph reports (if available) in as simple to understand and/or laymen's terms to the best of my ability. I had an indepth conversation with the patient and went over the patient's individual diagnosis based on the information that was currently available. I discussed the TNM staging process with regard to the patient's particular cancer type, and the calculated stage based on the currently available TNM data and literature. I discussed the available prognostic data with regard to the current staging information and how it relates to the prognosis of their particular neoplastic process.        NCCN Guidelines:    I discussed the available treatment option(s) in accordance with the latest literature from the NCCN Clinical Practice Guidelines for the patient's particular type of cancer disorder. The NCCN Guidelines provide a "document evidence-based (and) consensus-driven management" of the care of oncology patients. The treatment recommendations were made not only in accordance to the NCCN guidelines, but also factored in to account the patient's overall age, condition, performance status and their medical co-morbidities. I went over the risks and benefits of the the treatment options (if any could be made) with regard to their particular cancer type, their cancer stage, their age, and their co-morbidities.       Chemotherapy Discussion:      I discussed the available treatment option(s) in accordance with the latest/current national evidence-based guidelines (NCCN, UpToDate, NCI, ASCO, etc where applicable), their overall age/condition and their co-morbidities. I also went over the risks and benefits of the chemotherapy with regard to their particular cancer type, their cancer stage, their age/condition, and their co-morbidities. I provided literature on the chemotherapy regimen and discussed the chemotherapy side-effect profiles of the drug(s). I discussed the importance of compliance with " obtaining and monitoring weekly lab work, and went over the potential hematopathology issues and risks with anemia, leucopenia and thrombocytopenia that can occur with chemotherapy. I discussed the potential risks of liver and kidney damage, which could be permanent and could necessitate dialysis long-term if kidney failure developed. I discussed the emetic and/or diarrheal potential of the regimen and the potential need for use of antiemetic and anti-diarrheal medications. I discussed the risk for development of anaphylactic shock, bronchospasm, dysrhythmia, and respiratory/cardiovascular arrest and/or failure. I discussed the potential risks for development of alopecia, cold sensory issues, ringing in ears, vertigo, cataracts, glaucoma, and neuropathy, all of which could end up being chronic and life-long. Some chemotherpyI discussed the risks of hand-foot syndrome and rashes, and development of other autoimmune mediated processes such as pneumonitis, hepatitis, and colitis which could be life threatening. I discussed the risks of the potential development of a rare but fatal viral mediated disease known as PML (Progressive Multifocal Leukoencephalopathy), and risk of future development of leukemia and/or lymphoma from use of certain chemotherapy agents. I discussed the need for neutropenic precautions, basic hygiene/sanitation behaviors and dietary restrictions.    The patient's consent has been obtained to proceed with the chemotherapy.The patient will be referred to Chemotherapy School /[facility] for training and education on chemotherapy, use of antiemetics and/or anti-diarrheals, use of NSAID's, potential chemotherapy side-effects, and any specific recommendations and precautions with the particular chemotherapy agents.      I answered all of the patient's (and family's, if applicable) questions to the best of my ability and to their complete satisfaction. The patient acknowledged full understanding  of the risks, recommendations and plan(s).     I spent over 25 mins of time with the patient. Reviewed results of the recently ordered labs, tests and studies; made directives with regards to the results. Over half of this time was spent couseling and coordinating care.    I have explained all of the above in detail and the patient understands all of the current recommendation(s). I have answered all of their questions to the best of my ability and to their complete satisfaction.   The patient is to continue with the current management plan.            Electronically signed by Kip Wright MD

## 2023-02-16 ENCOUNTER — OFFICE VISIT (OUTPATIENT)
Dept: HEMATOLOGY/ONCOLOGY | Facility: CLINIC | Age: 68
End: 2023-02-16
Payer: MEDICARE

## 2023-02-16 ENCOUNTER — HOSPITAL ENCOUNTER (OUTPATIENT)
Dept: RADIOLOGY | Facility: HOSPITAL | Age: 68
Discharge: HOME | End: 2023-02-16
Attending: INTERNAL MEDICINE
Payer: MEDICARE

## 2023-02-16 VITALS
HEART RATE: 62 BPM | SYSTOLIC BLOOD PRESSURE: 109 MMHG | RESPIRATION RATE: 18 BRPM | TEMPERATURE: 98 F | BODY MASS INDEX: 24.85 KG/M2 | WEIGHT: 168.31 LBS | DIASTOLIC BLOOD PRESSURE: 58 MMHG

## 2023-02-16 DIAGNOSIS — D64.9 ANEMIA, UNSPECIFIED TYPE: ICD-10-CM

## 2023-02-16 DIAGNOSIS — C61 PROSTATE CANCER: ICD-10-CM

## 2023-02-16 DIAGNOSIS — C61 PROSTATE CANCER: Primary | ICD-10-CM

## 2023-02-16 DIAGNOSIS — D53.9 NUTRITIONAL ANEMIA, UNSPECIFIED: ICD-10-CM

## 2023-02-16 PROCEDURE — 99214 OFFICE O/P EST MOD 30 MIN: CPT | Mod: S$GLB,,, | Performed by: INTERNAL MEDICINE

## 2023-02-16 PROCEDURE — 99214 PR OFFICE/OUTPT VISIT, EST, LEVL IV, 30-39 MIN: ICD-10-PCS | Mod: S$GLB,,, | Performed by: INTERNAL MEDICINE

## 2023-02-16 PROCEDURE — 71046 X-RAY EXAM CHEST 2 VIEWS: CPT | Mod: TC,PO

## 2023-02-17 ENCOUNTER — TELEPHONE (OUTPATIENT)
Dept: HEMATOLOGY/ONCOLOGY | Facility: CLINIC | Age: 68
End: 2023-02-17

## 2023-02-17 NOTE — TELEPHONE ENCOUNTER
----- Message from Kip Wright MD sent at 2/16/2023 12:33 PM CST -----  Please let him know there are no acute changes on CXR per radiology - stable since Sept 2021\    Cc: Kimberlyn

## 2023-05-01 ENCOUNTER — TELEPHONE (OUTPATIENT)
Dept: PHARMACY | Facility: CLINIC | Age: 68
End: 2023-05-01
Payer: MEDICARE

## 2023-05-01 DIAGNOSIS — C61 PROSTATE CANCER: ICD-10-CM

## 2023-05-01 RX ORDER — ABIRATERONE ACETATE 250 MG/1
1000 TABLET ORAL DAILY
Qty: 360 TABLET | Refills: 3 | Status: SHIPPED | OUTPATIENT
Start: 2023-05-01 | End: 2023-05-01

## 2023-05-01 NOTE — TELEPHONE ENCOUNTER
Patient called in and reported he is having difficulty getting his zytiga through [facility] and is now out. He is concerned he will miss doses as he took his last pill today. Spoke with [facility], who reports that they have the medication in stock and may be able to get the medication to the patient by tomorrow morning. Reviewed with patient, who verbalized understanding.

## 2023-05-01 NOTE — TELEPHONE ENCOUNTER
Incoming call from nurse with provider office stating the patient is out of the Zytiga and the pharmacy [facility] usually fills with ([facility] told him the earliest it can be filled is on 5/22. Informed nurse [facility] will need a prescription to work on the order. Nurse verbalized understanding. Routing the assigned team.

## 2023-05-02 ENCOUNTER — TELEPHONE (OUTPATIENT)
Dept: HEMATOLOGY/ONCOLOGY | Facility: CLINIC | Age: 68
End: 2023-05-02

## 2023-05-02 NOTE — TELEPHONE ENCOUNTER
Attempted to talked to pt regarding BW  left a message for patient to get labs done for appointment on 5/11/23

## 2023-05-03 ENCOUNTER — LAB VISIT (OUTPATIENT)
Dept: LAB | Facility: HOSPITAL | Age: 68
End: 2023-05-03
Attending: INTERNAL MEDICINE
Payer: MEDICARE

## 2023-05-03 DIAGNOSIS — C61 PROSTATE CANCER: ICD-10-CM

## 2023-05-03 LAB
ALBUMIN SERPL BCP-MCNC: 4.1 G/DL (ref 3.5–5.2)
ALP SERPL-CCNC: 82 U/L (ref 55–135)
ALT SERPL W/O P-5'-P-CCNC: 25 U/L (ref 10–44)
ANION GAP SERPL CALC-SCNC: 5 MMOL/L (ref 8–16)
AST SERPL-CCNC: 26 U/L (ref 10–40)
BASOPHILS # BLD AUTO: 0.03 K/UL (ref 0–0.2)
BASOPHILS NFR BLD: 0.6 % (ref 0–1.9)
BILIRUB SERPL-MCNC: 1.5 MG/DL (ref 0.1–1)
BUN SERPL-MCNC: 21 MG/DL (ref 8–23)
CALCIUM SERPL-MCNC: 9.5 MG/DL (ref 8.7–10.5)
CHLORIDE SERPL-SCNC: 105 MMOL/L (ref 95–110)
CO2 SERPL-SCNC: 29 MMOL/L (ref 23–29)
COMPLEXED PSA SERPL-MCNC: <0.01 NG/ML (ref 0–4)
CREAT SERPL-MCNC: 0.9 MG/DL (ref 0.5–1.4)
DIFFERENTIAL METHOD: ABNORMAL
EOSINOPHIL # BLD AUTO: 0.1 K/UL (ref 0–0.5)
EOSINOPHIL NFR BLD: 1.5 % (ref 0–8)
ERYTHROCYTE [DISTWIDTH] IN BLOOD BY AUTOMATED COUNT: 13.5 % (ref 11.5–14.5)
EST. GFR  (NO RACE VARIABLE): >60 ML/MIN/1.73 M^2
GLUCOSE SERPL-MCNC: 119 MG/DL (ref 70–110)
HCT VFR BLD AUTO: 40.1 % (ref 40–54)
HGB BLD-MCNC: 13.3 G/DL (ref 14–18)
IMM GRANULOCYTES # BLD AUTO: 0.01 K/UL (ref 0–0.04)
IMM GRANULOCYTES NFR BLD AUTO: 0.2 % (ref 0–0.5)
LYMPHOCYTES # BLD AUTO: 0.7 K/UL (ref 1–4.8)
LYMPHOCYTES NFR BLD: 14.4 % (ref 18–48)
MCH RBC QN AUTO: 31.7 PG (ref 27–31)
MCHC RBC AUTO-ENTMCNC: 33.2 G/DL (ref 32–36)
MCV RBC AUTO: 96 FL (ref 82–98)
MONOCYTES # BLD AUTO: 0.4 K/UL (ref 0.3–1)
MONOCYTES NFR BLD: 8.3 % (ref 4–15)
NEUTROPHILS # BLD AUTO: 3.6 K/UL (ref 1.8–7.7)
NEUTROPHILS NFR BLD: 75 % (ref 38–73)
NRBC BLD-RTO: 0 /100 WBC
PLATELET # BLD AUTO: 176 K/UL (ref 150–450)
PMV BLD AUTO: 11.2 FL (ref 9.2–12.9)
POTASSIUM SERPL-SCNC: 3.6 MMOL/L (ref 3.5–5.1)
PROT SERPL-MCNC: 7 G/DL (ref 6–8.4)
RBC # BLD AUTO: 4.2 M/UL (ref 4.6–6.2)
SODIUM SERPL-SCNC: 139 MMOL/L (ref 136–145)
WBC # BLD AUTO: 4.8 K/UL (ref 3.9–12.7)

## 2023-05-03 PROCEDURE — 85025 COMPLETE CBC W/AUTO DIFF WBC: CPT | Performed by: INTERNAL MEDICINE

## 2023-05-03 PROCEDURE — 36415 COLL VENOUS BLD VENIPUNCTURE: CPT | Performed by: INTERNAL MEDICINE

## 2023-05-03 PROCEDURE — 84153 ASSAY OF PSA TOTAL: CPT | Performed by: INTERNAL MEDICINE

## 2023-05-03 PROCEDURE — 80053 COMPREHEN METABOLIC PANEL: CPT | Performed by: INTERNAL MEDICINE

## 2023-05-04 ENCOUNTER — OFFICE VISIT (OUTPATIENT)
Dept: FAMILY MEDICINE | Facility: CLINIC | Age: 68
End: 2023-05-04
Payer: MEDICARE

## 2023-05-04 VITALS
BODY MASS INDEX: 25.34 KG/M2 | OXYGEN SATURATION: 95 % | SYSTOLIC BLOOD PRESSURE: 114 MMHG | HEIGHT: 69 IN | WEIGHT: 171.06 LBS | TEMPERATURE: 98 F | HEART RATE: 54 BPM | DIASTOLIC BLOOD PRESSURE: 74 MMHG

## 2023-05-04 DIAGNOSIS — I15.8 OTHER SECONDARY HYPERTENSION: Primary | ICD-10-CM

## 2023-05-04 DIAGNOSIS — R73.9 HYPERGLYCEMIA: ICD-10-CM

## 2023-05-04 DIAGNOSIS — C61 PROSTATE CANCER: ICD-10-CM

## 2023-05-04 PROCEDURE — 99213 PR OFFICE/OUTPT VISIT, EST, LEVL III, 20-29 MIN: ICD-10-PCS | Mod: S$PBB,,, | Performed by: FAMILY MEDICINE

## 2023-05-04 PROCEDURE — 99999 PR PBB SHADOW E&M-EST. PATIENT-LVL III: CPT | Mod: PBBFAC,,, | Performed by: FAMILY MEDICINE

## 2023-05-04 PROCEDURE — 99999 PR PBB SHADOW E&M-EST. PATIENT-LVL III: ICD-10-PCS | Mod: PBBFAC,,, | Performed by: FAMILY MEDICINE

## 2023-05-04 PROCEDURE — 99213 OFFICE O/P EST LOW 20 MIN: CPT | Mod: S$PBB,,, | Performed by: FAMILY MEDICINE

## 2023-05-04 PROCEDURE — 99213 OFFICE O/P EST LOW 20 MIN: CPT | Mod: PBBFAC,PN | Performed by: FAMILY MEDICINE

## 2023-05-04 RX ORDER — ABIRATERONE ACETATE 250 MG/1
4 TABLET ORAL DAILY
COMMUNITY
Start: 2023-05-02 | End: 2023-05-17 | Stop reason: SDUPTHER

## 2023-05-04 NOTE — PROGRESS NOTES
Subjective     Patient ID: Rusty Sen II is a 68 y.o. male.    Chief Complaint: Hypertension and Prostate Cancer    Patient presents here for annual follow-up of hypertension and prostate cancer.  Shortly before his last visit last year, he was diagnosed with prostate cancer and has been undergoing treatment.  He is followed by both Urology as well as Oncology.  He did have radiation therapy to the prostate and is presently taking Eligard injections every 3 months as well as abiraterone 1000 mg daily.  He is tolerating the medication well.  His most recent PSA was less than 0.01.  Other lab showed a mild anemia with a hemoglobin of 13.3 and hematocrit of 40.1.  His comprehensive metabolic profile was completely normal except for slight elevation of total bilirubin to 1.5 and a nonfasting glucose of 119.  His hypertension is well controlled with his present use of amlodipine and ramipril, and he is tolerating the medication well.  His weight is stable since his visit last year and his BMI today is 25.26.  Also since his last visit, he did have a colonoscopy on 01/04/2023 which was completely normal except for some mild diverticulosis.  He does exercise on a regular basis and is following a healthy diet.  As far as health maintenance, he is up-to-date with all of his recommended screening exams and immunizations with the exception of pneumococcal vaccine, tetanus vaccine, shingles vaccine, 2nd COVID booster, and hemoglobin A1c for screening.  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  He states that he try to retrieve these but apparently he was told they were lost.  He will look to see if he has a copy of these at home.      Review of Systems   Constitutional:  Negative for chills, fatigue, fever and unexpected weight change.   HENT:  Negative for nasal congestion, ear discharge,  ear pain, postnasal drip and sore throat.    Respiratory:  Negative for cough and shortness of breath.    Cardiovascular:  Negative for chest pain and palpitations.   Gastrointestinal:  Positive for blood in stool (Occasionally from radiation proctitis). Negative for abdominal pain, diarrhea, nausea and vomiting.   Genitourinary:  Negative for difficulty urinating and dysuria.   Musculoskeletal:  Negative for arthralgias and back pain.   Neurological:  Negative for dizziness, light-headedness and headaches.   Psychiatric/Behavioral:  Negative for sleep disturbance. The patient is not nervous/anxious.         Objective     Physical Exam  Vitals reviewed.   Constitutional:       General: He is not in acute distress.     Appearance: Normal appearance. He is well-developed and normal weight.   HENT:      Right Ear: Tympanic membrane and external ear normal.      Left Ear: Tympanic membrane and external ear normal.      Mouth/Throat:      Pharynx: Oropharynx is clear. No posterior oropharyngeal erythema.   Neck:      Thyroid: No thyromegaly.      Vascular: No carotid bruit.   Cardiovascular:      Rate and Rhythm: Normal rate and regular rhythm.      Pulses: Normal pulses.      Heart sounds: Normal heart sounds. No murmur heard.  Pulmonary:      Effort: Pulmonary effort is normal.      Breath sounds: Normal breath sounds. No wheezing or rales.   Abdominal:      General: Bowel sounds are normal.      Palpations: Abdomen is soft.      Tenderness: There is no abdominal tenderness. There is no guarding or rebound.   Musculoskeletal:         General: Normal range of motion.      Cervical back: Normal range of motion and neck supple.      Right lower leg: No edema.      Left lower leg: No edema.   Lymphadenopathy:      Cervical: No cervical adenopathy.   Neurological:      General: No focal deficit present.      Mental Status: He is alert and oriented to person, place, and time.      Cranial Nerves: No cranial nerve deficit.       Deep Tendon Reflexes: Reflexes are normal and symmetric.          Assessment and Plan     Problem List Items Addressed This Visit       Prostate cancer    Other secondary hypertension - Primary     Other Visit Diagnoses       Hyperglycemia        Relevant Orders    Hemoglobin A1C            1. Continue present medication as his hypertension and prostate cancer are stable and controlled   2. Continue low-sodium diet and exercise.  BMI today is 25.26   3. Hemoglobin A1c with his next blood draw from his oncologist due to his mild hyperglycemia  4. Continue follow-up with Oncology and Urology as scheduled  5.  Follow up with me in 1 year or p.r.n.

## 2023-05-10 NOTE — PROGRESS NOTES
[facility] Hematology/Oncology  PROGRESS NOTE - Follow-up Visit      Subjective:       Patient ID:   NAME: Rusty Sen II : 1955     68 y.o. male    Referring Doc: Jamie Martin Jr., MD  Other Physicians: Matheus Sofia           Chief Complaint: prostate cancer f/u       History of Present Illness:     Patient returns today for a regularly scheduled follow-up visit.  The patient is here today to go over the results of the recently ordered labs, tests and studies. He is here by himself     He previously had completed XRT and he is continued on zytiga/ADT. He has some fatigue.     He had bout of proctitis about 6 weeks ago (probably from the radiation). He had colonoscopy with Dr Cornejo about 6 weeks prior to that    He continues ADT every 3 months    He recently saw Dr Sofia on 2023    Breathing ok, No CP, SOB, HA's or N/V;                      ROS:   GEN: normal without any fever, night sweats or weight loss  HEENT: normal with no HA's, sore throat, stiff neck, changes in vision  CV: normal with no CP, SOB, PND, FAIRCHILD or orthopnea  PULM: normal with no SOB, cough, hemoptysis, sputum or pleuritic pain  GI: normal with no abdominal pain, nausea, vomiting, constipation, diarrhea, melanotic stools, BRBPR, or hematemesis  : normal with no hematuria, dysuria  BREAST: normal with no mass, discharge, pain  SKIN: normal with no rash, erythema, bruising, or swelling    Pain Scale:  0  Allergies:  Review of patient's allergies indicates:  No Known Allergies    Medications:    Current Outpatient Medications:     abiraterone (ZYTIGA) 250 mg Tab, Take 4 tablets by mouth once daily., Disp: , Rfl:     amLODIPine (NORVASC) 5 MG tablet, Take 1 tablet (5 mg total) by mouth once daily., Disp: 90 tablet, Rfl: 3    predniSONE (DELTASONE) 5 MG tablet, Take 1 tablet (5 mg total) by mouth once daily., Disp: 90 tablet, Rfl: 3    ramipriL (ALTACE) 10 MG capsule, Take 1 capsule (10 mg total) by mouth once daily.,  Disp: 90 capsule, Rfl: 3    leuprolide, 3 month, (ELIGARD, 3 MONTH,) 22.5 mg Syrg syringe, Inject 22.5 mg into the skin every 3 (three) months., Disp: 1 each, Rfl: 3    PMHx/PSHx Updates:  See patient's last visit with me on  2/16/2023  See H&P on 5/16/2022        Pathology:   Cancer Staging   Prostate cancer  Staging form: Prostate, AJCC 8th Edition  - Clinical: Stage IIIC (cT2a, cN0, cM0, PSA: 23.7, Grade Group: 5) - Signed by Jamie Martin Jr., MD on 5/6/2022      CT abdom/pelvis  4/2/7/2022:     IMPRESSION: Ovoid sclerotic lesions at L5, left-sided sacrum and left superior pubic ramus most likely representing bone island however metastatic disease cannot be excluded and correlation with bone scan is recommended     Levoscoliosis with multilevel degenerative disc disease and facet hypertrophy     Sigmoid diverticulosis without diverticulitis        Bone scan 4/27/2022:     IMPRESSION: Increased activity in joint central distribution as well as the spine compatible with degenerative changes     No evidence of metastatic disease     The abnormality seen on CT most likely represent bone islands     MRI prostate  5/12/2022:      Impression:     1. PIRADS 5.  Large lesion in the right peripheral zone with clinically significant cancer highly likely to be present.  Bulging of the prostate capsule raises the possibility of extracapsular extension.  2. Lesion is in close proximity to the right neurovascular bundle and also extends into the right seminal vesicles.  3. There are prominent but nonenlarged mesorectal lymph nodes which are indeterminate as to the possibility of metastatic disease and warrant continued attention on follow-up imaging.  4. There is also an indeterminate sclerotic bone lesion in the left pubic body.  5. Benign prostatic hypertrophy.  Overall Assessment:     PIRADS 1 (clinically significant cancer is highly unlikely to be present)     PIRADS 2 (clinically significant cancer is unlikely to be  "present)     PIRADS 3 (the presence of clinically significant cancer is equivocal)     PIRADS 4 (clinically significant cancer is likely to be present)     PIRADS 5 (clinically significant cancer is highly likely to be present)     Number of targets created for potential MR/US fusion biopsy     Peripheral zone: 1     Transition zone: 0           Objective:     Vitals:  Blood pressure (!) 145/81, pulse (!) 56, temperature 98 °F (36.7 °C), resp. rate 16, height 5' 6" (1.676 m), weight 78.2 kg (172 lb 8 oz).    Physical Examination:   GEN: no apparent distress, comfortable; AAOx3  HEAD: atraumatic and normocephalic  EYES: no pallor, no icterus, PERRLA  ENT: OMM, no pharyngeal erythema, external ears WNL; no nasal discharge; no thrush  NECK: no masses, thyroid normal, trachea midline, no LAD/LN's, supple  CV: RRR with no murmur; normal pulse; normal S1 and S2; no pedal edema  CHEST: Normal respiratory effort; CTAB; normal breath sounds; no wheeze or crackles  ABDOM: nontender and nondistended; soft; normal bowel sounds; no rebound/guarding  MUSC/Skeletal: ROM normal; no crepitus; joints normal; no deformities or arthropathy  EXTREM: no clubbing, cyanosis, inflammation or swelling  SKIN: no rashes, lesions, ulcers, petechiae or subcutaneous nodules  : no florez  NEURO: grossly intact; motor/sensory WNL; AAOx3; no tremors  PSYCH: normal mood, affect and behavior  LYMPH: normal cervical, supraclavicular, axillary and groin LN's            Labs:     Lab Results   Component Value Date    WBC 4.80 05/03/2023    HGB 13.3 (L) 05/03/2023    HCT 40.1 05/03/2023    MCV 96 05/03/2023     05/03/2023       CMP  Sodium   Date Value Ref Range Status   05/03/2023 139 136 - 145 mmol/L Final     Potassium   Date Value Ref Range Status   05/03/2023 3.6 3.5 - 5.1 mmol/L Final     Chloride   Date Value Ref Range Status   05/03/2023 105 95 - 110 mmol/L Final     CO2   Date Value Ref Range Status   05/03/2023 29 23 - 29 mmol/L Final "     Glucose   Date Value Ref Range Status   05/03/2023 119 (H) 70 - 110 mg/dL Final     BUN   Date Value Ref Range Status   05/03/2023 21 8 - 23 mg/dL Final     Creatinine   Date Value Ref Range Status   05/03/2023 0.9 0.5 - 1.4 mg/dL Final     Calcium   Date Value Ref Range Status   05/03/2023 9.5 8.7 - 10.5 mg/dL Final     Total Protein   Date Value Ref Range Status   05/03/2023 7.0 6.0 - 8.4 g/dL Final     Albumin   Date Value Ref Range Status   05/03/2023 4.1 3.5 - 5.2 g/dL Final     Total Bilirubin   Date Value Ref Range Status   05/03/2023 1.5 (H) 0.1 - 1.0 mg/dL Final     Comment:     For infants and newborns, interpretation of results should be based  on gestational age, weight and in agreement with clinical  observations.    Premature Infant recommended reference ranges:  Up to 24 hours.............<8.0 mg/dL  Up to 48 hours............<12.0 mg/dL  3-5 days..................<15.0 mg/dL  6-29 days.................<15.0 mg/dL       Alkaline Phosphatase   Date Value Ref Range Status   05/03/2023 82 55 - 135 U/L Final     AST   Date Value Ref Range Status   05/03/2023 26 10 - 40 U/L Final     ALT   Date Value Ref Range Status   05/03/2023 25 10 - 44 U/L Final     Anion Gap   Date Value Ref Range Status   05/03/2023 5 (L) 8 - 16 mmol/L Final     eGFR if    Date Value Ref Range Status   07/05/2022 >60.0 >60 mL/min/1.73 m^2 Final     eGFR if non    Date Value Ref Range Status   07/05/2022 >60.0 >60 mL/min/1.73 m^2 Final     Comment:     Calculation used to obtain the estimated glomerular filtration  rate (eGFR) is the CKD-EPI equation.           PSA Diagnostic 0.00 - 4.00 ng/mL <0.01          Radiology/Diagnostic Studies:    CXR 2/16/2023:  IMPRESSION:  1. No acute radiographic abnormalities or detrimental changes compared to September 2021.        I have reviewed all available lab results and radiology reports.    Assessment/Plan:   (1) 68 y.o. male with diagnosis of prostate  cancer who has been referred by Jamie Martin Jr., MD for evaluation by medical hematology/oncology. 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 He was recently diagnosed with prostate cancer after having been found to have an elevated PSA at 23.7. He underwent a transrectal US with biopsy with Dr Walter with  on 4/18/2022.       - Pathology came back showing adenocarcinoma in 5 out of 8 cores with: 80%  RB [place] 4+5 (Group 5), 70% RM [place] 3+4 (Group 3); 30% RA [place] 3 + 4 (Group 2); and 25% LB [place] 3 + 3.   - perineural invasion per path report; [place] 4+5  - He had a CT abdomen pelvis on 4/27 which showed sclerotic lesions at L5, left sacrum and left superior pubic ramus with differential of bone island versus metastatic disease, however, the bone scan on 4/27/2022 was negative.   - Stage IIIC (cT2a N0 M0) per Dr Martin's assessment     - reviewed and discussed the pathology and the latest NCCN guidelines (vers. 4.2022)  - he is considered high/very high risk category given the NCCN criteria  - already started on Eligard ADT about 3 weeks ago  - discussed benefit with addition of abiraterone (Zytiga)  - patient wants to be aggressive with his therapy  - will set up with chemotherapy school; discuss the side-effect profile of the drugs and provide literature on the drugs    6/22/2022:  - he is now on the abiraterone and XRT with ADT  - he is on week #4 of the XRT  - slight drop in hgb and plats but no unexpected  - some diarrhea but very mild    8/17/2022:  - He completed XRT about 2-3 weeks ago and his symptoms improved after about 7-10 days;  - he is continued on zytiga/ADT. He had some recent labs.   - He had eligard injection last Wednesday and continues ADT every 3 months    11/16/2022:  - he is continued on the zytiga and ADT  - some fatigue  - some increase in BP -  I instructed him to monitor his BP at home and if it continues to run high then f/u with Dr Sofia sooner than March  2023 2/16/2023:  - continued on zytiga/pred and ADT  - PSA remains <0.01    5/11/2023:  - doing ok with no new issues  - PSA <0.01  - continued on the zytiga and ADT          (2) Family Hx/of prostate cancer (dad), breast ca (sister), lymphoma (dad)     (3) Hx/of left rotator cuff/acromioplasty surgery - Spet 2021 with Dr Craven     (4) GERD - prior colonoscopy with Dr Cornejo with GI in Jan 2023     (5) History of positive PPD - recent TB Gold positive - will get CXR - Dr Sofia is aware    VISIT DIAGNOSES:      Prostate cancer          PLAN:  1. continued on zytiga; s/p chemotherapy school for the Zytiga; discuss the side-effect profile, provided literature on the drug and obtain consents  2  continue ADT; check labs every 3 months    3. BP Management as per primary  4. F/u with PCP, , Rad/onc as directed  5. RTC in 3 months     Fax note to Jamie Martin Jr., MD; Kimberlyn Walter Ke[place], [place]       Discussion:     COVID-19 Discussion:    I had long discussion with patient and any applicable family about the COVID-19 coronavirus epidemic and the recommended precautions with regard to cancer and/or hematology patients. I have re-iterated the CDC recommendations for adequate hand washing, use of hand -like products, and coughing into elbow, etc. In addition, especially for our patients who are on chemotherapy and/or our otherwise immunocompromised patients, I have recommended avoidance of crowds, including movie theaters, restaurants, churches, etc. I have recommended avoidance of any sick or symptomatic family members and/or friends. I have also recommended avoidance of any raw and unwashed food products, and general avoidance of food items that have not been prepared by themselves. The patient has been asked to call us immediately with any symptom developments, issues, questions or other general concerns.       Pathology Discussion:    I reviewed and discussed the pathology  "report(s) and radiograph reports (if available) in as simple to understand and/or laymen's terms to the best of my ability. I had an indepth conversation with the patient and went over the patient's individual diagnosis based on the information that was currently available. I discussed the TNM staging process with regard to the patient's particular cancer type, and the calculated stage based on the currently available TNM data and literature. I discussed the available prognostic data with regard to the current staging information and how it relates to the prognosis of their particular neoplastic process.        NCCN Guidelines:    I discussed the available treatment option(s) in accordance with the latest literature from the NCCN Clinical Practice Guidelines for the patient's particular type of cancer disorder. The NCCN Guidelines provide a "document evidence-based (and) consensus-driven management" of the care of oncology patients. The treatment recommendations were made not only in accordance to the NCCN guidelines, but also factored in to account the patient's overall age, condition, performance status and their medical co-morbidities. I went over the risks and benefits of the the treatment options (if any could be made) with regard to their particular cancer type, their cancer stage, their age, and their co-morbidities.       Chemotherapy Discussion:      I discussed the available treatment option(s) in accordance with the latest/current national evidence-based guidelines (NCCN, UpToDate, NCI, ASCO, etc where applicable), their overall age/condition and their co-morbidities. I also went over the risks and benefits of the chemotherapy with regard to their particular cancer type, their cancer stage, their age/condition, and their co-morbidities. I provided literature on the chemotherapy regimen and discussed the chemotherapy side-effect profiles of the drug(s). I discussed the importance of compliance with " obtaining and monitoring weekly lab work, and went over the potential hematopathology issues and risks with anemia, leucopenia and thrombocytopenia that can occur with chemotherapy. I discussed the potential risks of liver and kidney damage, which could be permanent and could necessitate dialysis long-term if kidney failure developed. I discussed the emetic and/or diarrheal potential of the regimen and the potential need for use of antiemetic and anti-diarrheal medications. I discussed the risk for development of anaphylactic shock, bronchospasm, dysrhythmia, and respiratory/cardiovascular arrest and/or failure. I discussed the potential risks for development of alopecia, cold sensory issues, ringing in ears, vertigo, cataracts, glaucoma, and neuropathy, all of which could end up being chronic and life-long. Some chemotherpyI discussed the risks of hand-foot syndrome and rashes, and development of other autoimmune mediated processes such as pneumonitis, hepatitis, and colitis which could be life threatening. I discussed the risks of the potential development of a rare but fatal viral mediated disease known as PML (Progressive Multifocal Leukoencephalopathy), and risk of future development of leukemia and/or lymphoma from use of certain chemotherapy agents. I discussed the need for neutropenic precautions, basic hygiene/sanitation behaviors and dietary restrictions.    The patient's consent has been obtained to proceed with the chemotherapy.The patient will be referred to Chemotherapy School /[facility] for training and education on chemotherapy, use of antiemetics and/or anti-diarrheals, use of NSAID's, potential chemotherapy side-effects, and any specific recommendations and precautions with the particular chemotherapy agents.      I answered all of the patient's (and family's, if applicable) questions to the best of my ability and to their complete satisfaction. The patient acknowledged full understanding  of the risks, recommendations and plan(s).     I spent over 25 mins of time with the patient. Reviewed results of the recently ordered labs, tests and studies; made directives with regards to the results. Over half of this time was spent couseling and coordinating care.    I have explained all of the above in detail and the patient understands all of the current recommendation(s). I have answered all of their questions to the best of my ability and to their complete satisfaction.   The patient is to continue with the current management plan.            Electronically signed by Kip Wright MD

## 2023-05-11 ENCOUNTER — OFFICE VISIT (OUTPATIENT)
Dept: HEMATOLOGY/ONCOLOGY | Facility: CLINIC | Age: 68
End: 2023-05-11
Payer: MEDICARE

## 2023-05-11 VITALS
BODY MASS INDEX: 27.72 KG/M2 | HEART RATE: 56 BPM | WEIGHT: 172.5 LBS | TEMPERATURE: 98 F | HEIGHT: 66 IN | RESPIRATION RATE: 16 BRPM | SYSTOLIC BLOOD PRESSURE: 145 MMHG | DIASTOLIC BLOOD PRESSURE: 81 MMHG

## 2023-05-11 DIAGNOSIS — C61 PROSTATE CANCER: Primary | ICD-10-CM

## 2023-05-11 PROCEDURE — 99214 PR OFFICE/OUTPT VISIT, EST, LEVL IV, 30-39 MIN: ICD-10-PCS | Mod: S$GLB,,, | Performed by: INTERNAL MEDICINE

## 2023-05-11 PROCEDURE — 99214 OFFICE O/P EST MOD 30 MIN: CPT | Mod: S$GLB,,, | Performed by: INTERNAL MEDICINE

## 2023-05-17 ENCOUNTER — TELEPHONE (OUTPATIENT)
Dept: PHARMACY | Facility: CLINIC | Age: 68
End: 2023-05-17
Payer: MEDICARE

## 2023-05-17 ENCOUNTER — TELEPHONE (OUTPATIENT)
Dept: HEMATOLOGY/ONCOLOGY | Facility: CLINIC | Age: 68
End: 2023-05-17

## 2023-05-17 DIAGNOSIS — C61 PROSTATE CANCER: Primary | ICD-10-CM

## 2023-05-17 DIAGNOSIS — D64.9 ANEMIA, UNSPECIFIED TYPE: ICD-10-CM

## 2023-05-17 RX ORDER — ABIRATERONE ACETATE 250 MG/1
1000 TABLET ORAL DAILY
Qty: 120 TABLET | Refills: 11 | Status: ACTIVE | OUTPATIENT
Start: 2023-05-17

## 2023-05-17 NOTE — TELEPHONE ENCOUNTER
Carey, this is Katherine Wood, clinical pharmacist with [facility] that is part of your care team.  We have begun working on your prescription that your doctor has sent us. Our next steps include:     Working with your insurance company to obtain approval for your medication  Working with you to ensure your medication is affordable     We will be calling you along the way with updates on your medication but if you have any concerns or receive information that you would like to discuss please reach us at [phone].    Welcome call outcome: Patient/caregiver reached    Patients wife reports not being able to tell exact amount on hand but has several days. Would like to fill at [facility] over local [employer]. 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     Now has [facility] phone number and will reach out if any questions.

## 2023-05-17 NOTE — PROGRESS NOTES
Talked to patient, sent to [facility] specialty will see what his copay is and see if they can send to specialty pharmacy for .

## 2023-05-25 ENCOUNTER — SPECIALTY PHARMACY (OUTPATIENT)
Dept: PHARMACY | Facility: CLINIC | Age: 68
End: 2023-05-25
Payer: MEDICARE

## 2023-05-25 NOTE — TELEPHONE ENCOUNTER
Specialty Pharmacy - Initial Clinical Assessment    Specialty Medication Orders Linked to Encounter      Flowsheet Row Most Recent Value   Medication #1 abiraterone (ZYTIGA) 250 mg Tab (Order#[id number], Rx#[id number])          Patient Diagnosis   C61 - Prostate cancer    Subjective    Rusty Sen II is a 68 y.o. male, who is followed by the specialty pharmacy service for management and education.    Recent Encounters       Date Type Provider Description    05/25/2023 Specialty Pharmacy Katherine Wood, Pradeep Initial Clinical Assessment    05/25/2023 Specialty Pharmacy Katherine Wood, Pradeep Referral Authorization            Current Outpatient Medications   Medication Sig    abiraterone (ZYTIGA) 250 mg Tab Take 4 tablets (1,000 mg total) by mouth once daily.    amLODIPine (NORVASC) 5 MG tablet Take 1 tablet (5 mg total) by mouth once daily.    leuprolide, 3 month, (ELIGARD, 3 MONTH,) 22.5 mg Syrg syringe Inject 22.5 mg into the skin every 3 (three) months.    predniSONE (DELTASONE) 5 MG tablet Take 1 tablet (5 mg total) by mouth once daily.    ramipriL (ALTACE) 10 MG capsule Take 1 capsule (10 mg total) by mouth once daily.   Last reviewed on 5/11/2023  9:16 AM by Kip Wright MD    Review of patient's allergies indicates:  No Known AllergiesLast reviewed on  5/25/2023 9:39 AM by Katherine Wood    Drug Interactions    Drug interactions evaluated: no  Clinically relevant drug interactions identified: no           Assessment Questions - Documented Responses      Flowsheet Row Most Recent Value   Assessment    Medication Reconciliation completed for patient No   During the past 4 weeks, has patient missed any activities due to condition or medication? No   During the past 4 weeks, did patient have any of the following urgent care visits? None   Goals of Therapy Status Achieving   Status of the patients ability to self-administer: Is Able   All education points have been covered with patient? No,  "patient declined- printed education provided   Welcome packet contents reviewed and discussed with patient? Yes   Assesment completed? No   Plan Therapy on-hold   Do you need to open a clinical intervention (i-vent)? No   Do you want to schedule first shipment? Yes   Medication #1 Assessment Info    Patient status Existing medication, New to [facility]   Is this medication appropriate for the patient? Yes   Is this medication effective? Yes          Refill Questions - Documented Responses      Flowsheet Row Most Recent Value   Patient Availability and HIPAA Verification    Does patient want to proceed with activity? Yes   HIPAA/medical authority confirmed? Yes   Relationship to patient of person spoken to? Self   Refill Screening Questions    When does the patient need to receive the medication? 05/31/23   Refill Delivery Questions    How will the patient receive the medication? MED[facility]   When does the patient need to receive the medication? 05/31/23   Shipping Address Home   Address in [facility] confirmed and updated if neccessary? Yes   Expected Copay ($) 77.38   Is the patient able to afford the medication copay? Yes   Payment Method zero copay   Days supply of Refill 30   Supplies needed? No supplies needed   Refill activity completed? Yes   Refill activity plan Refill scheduled   Shipment/Pickup Date: 05/26/23            Objective    He has a past medical history of Cancer.    Tried/failed medications: With Prednisone and Eliguard    BP Readings from Last 4 Encounters:   05/11/23 (!) 145/81   05/04/23 114/74   02/16/23 (!) 109/58   11/21/22 (!) 164/98     Ht Readings from Last 4 Encounters:   05/11/23 5' 6" (1.676 m)   05/04/23 5' 9" (1.753 m)   11/16/22 5' 9" (1.753 m)   08/17/22 5' 9" (1.753 m)     Wt Readings from Last 4 Encounters:   05/11/23 78.2 kg (172 lb 8 oz)   05/04/23 77.6 kg (171 lb 1.2 oz)   02/16/23 76.3 kg (168 lb 4.8 oz)   11/16/22 76.5 kg (168 lb 11.2 oz)     Recent Labs   Lab Result Units " 05/03/23  1035   RBC M/uL 4.20 L   Hemoglobin g/dL 13.3 L   Hematocrit % 40.1   WBC K/uL 4.80   Gran # (ANC) K/uL 3.6   Gran % % 75.0 H   Platelets K/uL 176   Sodium mmol/L 139   Potassium mmol/L 3.6   Chloride mmol/L 105   Glucose mg/dL 119 H   BUN mg/dL 21   Creatinine mg/dL 0.9   Calcium mg/dL 9.5   Total Protein g/dL 7.0   Albumin g/dL 4.1   Total Bilirubin mg/dL 1.5 H   Alkaline Phosphatase U/L 82   AST U/L 26   ALT U/L 25     The goals of cancer treatment include:  Achieving remission of cancer, if possible  Reducing tumor size and spread of cancer, if remission is not possible  Minimizing pain and symptoms of the cancer  Preventing infection and other complications of treatment  Promoting adequate nutrition  Encouraging proper hydration  Improving or maintaining quality of life  Maintaining optimal therapy adherence  Minimizing and managing side effects    Goals of Therapy Status: Achieving    Assessment/Plan  Patient plans to continue therapy without changes      Indication, dosage, appropriateness, effectiveness, safety and convenience of his specialty medication(s) were reviewed today.     Patient Education   Pharmacist offer to  patient was declined. Printed educational materials will be provided with medication.  Patient did accept verbal education on the following topics:  · Duration of therapy  · New or changed medications, including prescribe and over the counter medications and supplements  · Storage, safe handling, and disposal        Tasks added this encounter   No tasks added.   Tasks due within next 3 months   5/25/2023 - Initial Clinical Assessment/Patient Education (180 Day Reassessment)  5/25/2023 - Set up Initial Fill     Katherine Wood, PharmD  [facility]  [address] [place] [id number]  Phone: [phone]  Fax: [phone]  Phone: [phone]  Fax: [phone]

## 2023-06-07 DIAGNOSIS — C61 PROSTATE CANCER: ICD-10-CM

## 2023-06-08 RX ORDER — PREDNISONE 5 MG/1
TABLET ORAL
Qty: 90 TABLET | Refills: 0 | Status: SHIPPED | OUTPATIENT
Start: 2023-06-08 | End: 2023-09-05 | Stop reason: SDUPTHER

## 2023-06-19 ENCOUNTER — SPECIALTY PHARMACY (OUTPATIENT)
Dept: PHARMACY | Facility: CLINIC | Age: 68
End: 2023-06-19
Payer: MEDICARE

## 2023-06-19 NOTE — TELEPHONE ENCOUNTER
Specialty Pharmacy - Refill Coordination    Specialty Medication Orders Linked to Encounter      Flowsheet Row Most Recent Value   Medication #1 abiraterone (ZYTIGA) 250 mg Tab (Order#[id number], Rx#[id number])            Refill Questions - Documented Responses      Flowsheet Row Most Recent Value   Patient Availability and HIPAA Verification    Does patient want to proceed with activity? Yes   HIPAA/medical authority confirmed? Yes   Relationship to patient of person spoken to? Self   Refill Screening Questions    Changes to allergies? No   Changes to medications? No   New conditions since last clinic visit? No   Unplanned office visit, urgent care, ED, or hospital admission in the last 4 weeks? No   How does patient/caregiver feel medication is working? Good   Financial problems or insurance changes? No   How many doses of your specialty medications were missed in the last 4 weeks? 0   Would patient like to speak to a pharmacist? No   When does the patient need to receive the medication? 06/23/23   Refill Delivery Questions    How will the patient receive the medication? MEDRx   When does the patient need to receive the medication? 06/23/23   Shipping Address Home   Address in [facility] confirmed and updated if neccessary? Yes   Expected Copay ($) 77.38   Is the patient able to afford the medication copay? Yes   Payment Method CC on file   Days supply of Refill 30   Supplies needed? No supplies needed   Refill activity completed? Yes   Refill activity plan Refill scheduled   Shipment/Pickup Date: 06/20/23            Current Outpatient Medications   Medication Sig    abiraterone (ZYTIGA) 250 mg Tab Take 4 tablets (1,000 mg total) by mouth once daily.    amLODIPine (NORVASC) 5 MG tablet Take 1 tablet (5 mg total) by mouth once daily.    leuprolide, 3 month, (ELIGARD, 3 MONTH,) 22.5 mg Syrg syringe Inject 22.5 mg into the skin every 3 (three) months.    predniSONE (DELTASONE) 5 MG tablet Take 1 tablet by  mouth once daily    ramipriL (ALTACE) 10 MG capsule Take 1 capsule (10 mg total) by mouth once daily.   Last reviewed on 5/11/2023  9:16 AM by Kip Wright MD    Review of patient's allergies indicates:  No Known Allergies Last reviewed on  5/25/2023 9:39 AM by Katherine Avelar      Tasks added this encounter   No tasks added.   Tasks due within next 3 months   6/18/2023 - Refill Coordination Outreach (1 time occurrence)     Kathie Araujo [facility]  [address] [place] [id number]  Phone: [phone]  Fax: [phone]

## 2023-06-25 ENCOUNTER — OFFICE VISIT (OUTPATIENT)
Dept: URGENT CARE | Facility: CLINIC | Age: 68
End: 2023-06-25
Payer: MEDICARE

## 2023-06-25 VITALS
WEIGHT: 175 LBS | SYSTOLIC BLOOD PRESSURE: 144 MMHG | DIASTOLIC BLOOD PRESSURE: 71 MMHG | HEART RATE: 54 BPM | OXYGEN SATURATION: 97 % | TEMPERATURE: 99 F | HEIGHT: 66 IN | BODY MASS INDEX: 28.12 KG/M2 | RESPIRATION RATE: 16 BRPM

## 2023-06-25 DIAGNOSIS — J02.9 SORE THROAT: ICD-10-CM

## 2023-06-25 DIAGNOSIS — J02.9 VIRAL PHARYNGITIS: Primary | ICD-10-CM

## 2023-06-25 LAB
CTP QC/QA: YES
S PYO RRNA THROAT QL PROBE: NEGATIVE

## 2023-06-25 PROCEDURE — 87880 POCT RAPID STREP A: ICD-10-PCS | Mod: QW,,, | Performed by: NURSE PRACTITIONER

## 2023-06-25 PROCEDURE — 99203 PR OFFICE/OUTPT VISIT, NEW, LEVL III, 30-44 MIN: ICD-10-PCS | Mod: S$GLB,,, | Performed by: NURSE PRACTITIONER

## 2023-06-25 PROCEDURE — 87880 STREP A ASSAY W/OPTIC: CPT | Mod: QW,,, | Performed by: NURSE PRACTITIONER

## 2023-06-25 PROCEDURE — 99203 OFFICE O/P NEW LOW 30 MIN: CPT | Mod: S$GLB,,, | Performed by: NURSE PRACTITIONER

## 2023-06-25 RX ORDER — MOLNUPIRAVIR 200 MG/1
CAPSULE ORAL
COMMUNITY
Start: 2023-06-22 | End: 2023-10-04

## 2023-06-25 NOTE — PROGRESS NOTES
"Subjective:      Patient ID: Rusty Sen II is a 68 y.o. male.    Vitals:  height is 5' 6" (1.676 m) and weight is 79.4 kg (175 lb). His temperature is 98.5 °F (36.9 °C). His blood pressure is 144/71 (abnormal) and his pulse is 54 (abnormal). His respiration is 16 and oxygen saturation is 97%.     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 His wife recently tested positive for covid. He prophylactically started lagevrio (molnupiravir) given his exposure. He denies fever, chills, N/V/D, chest pain, or SOB. He noticed some white exudate on the palatine tonsils bilat and was concerned about possible strep.     Sore Throat   This is a new problem. The current episode started in the past 7 days (x's 4 days). The problem has been gradually worsening. There has been no fever. Associated symptoms include coughing and swollen glands. Pertinent negatives include no congestion, diarrhea, headaches, shortness of breath, trouble swallowing or vomiting. Treatments tried: naproxen. The treatment provided no relief.     Constitution: Negative for chills, sweating and fatigue.   HENT:  Positive for sore throat. Negative for congestion and trouble swallowing.    Neck: Negative for neck stiffness and painful lymph nodes.   Cardiovascular:  Negative for chest pain.   Respiratory:  Positive for cough. Negative for shortness of breath.    Gastrointestinal:  Negative for nausea, vomiting and diarrhea.   Genitourinary:  Negative for dysuria.   Musculoskeletal:  Negative for back pain.   Skin:  Negative for hives.   Allergic/Immunologic: Negative for hives.   Neurological:  Negative for dizziness, light-headedness and headaches.   Hematologic/Lymphatic: Negative for swollen lymph nodes.    Objective:     Physical Exam   Constitutional: He is oriented to person, place, and time. He appears well-developed. He " is cooperative.  Non-toxic appearance. He does not appear ill. No distress.   HENT:   Head: Normocephalic and atraumatic.   Ears:   Right Ear: Hearing and external ear normal.   Left Ear: Hearing and external ear normal.   Nose: Nose normal. No mucosal edema or nasal deformity. No epistaxis. Right sinus exhibits no maxillary sinus tenderness and no frontal sinus tenderness. Left sinus exhibits no maxillary sinus tenderness and no frontal sinus tenderness.   Mouth/Throat: Uvula is midline and mucous membranes are normal. Mucous membranes are moist. No trismus in the jaw. Normal dentition. No uvula swelling. Oropharyngeal exudate and posterior oropharyngeal erythema present. No posterior oropharyngeal edema.      Comments: Mild posterior oropharyngeal erythema. Mild White exudate bilat on palatine tonsils  Eyes: Conjunctivae and lids are normal. No scleral icterus.   Neck: Trachea normal and phonation normal.      Comments: Left submandibular tender adenopathy No edema present. No erythema present.   Cardiovascular: Normal rate.   Pulmonary/Chest: Effort normal. No respiratory distress. He has no decreased breath sounds.   Abdominal: Normal appearance. He exhibits no distension.   Musculoskeletal: Normal range of motion.         General: Normal range of motion.      Cervical back: He exhibits tenderness.   Lymphadenopathy:     He has cervical adenopathy.   Neurological: He is alert and oriented to person, place, and time. He exhibits normal muscle tone.   Skin: Skin is warm, dry, intact, not diaphoretic and not pale.   Psychiatric: His speech is normal and behavior is normal.   Nursing note and vitals reviewed.    Assessment:     1. Viral pharyngitis    2. Sore throat        Plan:       Viral pharyngitis    Sore throat  -     POCT rapid strep A        Rapid strep is negative in clinic and pt informed. More likely viral pharyngitis, concern for covid given exposure even with negative home test. Continue symptomatic  management with oral hydration, pain control with acetaminophen/naproxen.

## 2023-07-13 ENCOUNTER — SPECIALTY PHARMACY (OUTPATIENT)
Dept: PHARMACY | Facility: CLINIC | Age: 68
End: 2023-07-13
Payer: MEDICARE

## 2023-07-13 NOTE — TELEPHONE ENCOUNTER
Outgoing call regarding Zytiga refill, pt states he does not need it at the moment, follow up in a week and he'll count his on hand and will informed [facility] if refill is needed or not. Informed pt will follow up on 7/20 to check on hand count.

## 2023-07-20 NOTE — TELEPHONE ENCOUNTER
Specialty Pharmacy - Refill Coordination    Specialty Medication Orders Linked to Encounter      Flowsheet Row Most Recent Value   Medication #1 abiraterone (ZYTIGA) 250 mg Tab (Order#[id number], Rx#[id number])            Refill Questions - Documented Responses      Flowsheet Row Most Recent Value   Patient Availability and HIPAA Verification    Does patient want to proceed with activity? Yes   HIPAA/medical authority confirmed? Yes   Relationship to patient of person spoken to? Self   Refill Screening Questions    Changes to allergies? No   Changes to medications? No   New conditions since last clinic visit? No   Unplanned office visit, urgent care, ED, or hospital admission in the last 4 weeks? No   How does patient/caregiver feel medication is working? Good   Financial problems or insurance changes? Yes   How many doses of your specialty medications were missed in the last 4 weeks? 0   Would patient like to speak to a pharmacist? No   When does the patient need to receive the medication? 23   Refill Delivery Questions    How will the patient receive the medication? MEDRx   When does the patient need to receive the medication? 23   Shipping Address Home   Address in [facility] confirmed and updated if neccessary? Yes   Expected Copay ($) 77.38   Is the patient able to afford the medication copay? Yes   Payment Method new CC added to file   Days supply of Refill 30   Supplies needed? No supplies needed   Refill activity completed? Yes   Refill activity plan Refill scheduled   Shipment/Pickup Date: 23            Current Outpatient Medications   Medication Sig    abiraterone (ZYTIGA) 250 mg Tab Take 4 tablets (1,000 mg total) by mouth once daily.    amLODIPine (NORVASC) 5 MG tablet Take 1 tablet (5 mg total) by mouth once daily.    LAGEVRIO, EUA, 200 mg capsule (EUA) SMARTSI Capsule(s) By Mouth Every 12 Hours    leuprolide, 3 month, (ELIGARD, 3 MONTH,) 22.5 mg Syrg syringe Inject 22.5 mg  into the skin every 3 (three) months.    predniSONE (DELTASONE) 5 MG tablet Take 1 tablet by mouth once daily    ramipriL (ALTACE) 10 MG capsule Take 1 capsule (10 mg total) by mouth once daily.   Last reviewed on 6/25/2023  2:57 PM by Lisa Rivera NP    Review of patient's allergies indicates:  No Known Allergies Last reviewed on  6/25/2023 2:57 PM by Lisa Rivera      Tasks added this encounter   No tasks added.   Tasks due within next 3 months   7/20/2023 - Refill Coordination Outreach (1 time occurrence)     [facility] - Special[facility]  [address] [facility] [address]  Phone: [phone]  Fax: [phone]

## 2023-07-20 NOTE — TELEPHONE ENCOUNTER
Specialty Pharmacy - Refill Coordination    Specialty Medication Orders Linked to Encounter      Flowsheet Row Most Recent Value   Medication #1 abiraterone (ZYTIGA) 250 mg Tab (Order#[id number], Rx#[id number])            Refill Questions - Documented Responses      Flowsheet Row Most Recent Value   Patient Availability and HIPAA Verification    Does patient want to proceed with activity? Yes   HIPAA/medical authority confirmed? Yes   Relationship to patient of person spoken to? Self   Refill Screening Questions    Changes to allergies? No   Changes to medications? No   New conditions since last clinic visit? No   Unplanned office visit, urgent care, ED, or hospital admission in the last 4 weeks? No   How does patient/caregiver feel medication is working? Good   Financial problems or insurance changes? No   How many doses of your specialty medications were missed in the last 4 weeks? 0   Would patient like to speak to a pharmacist? No   When does the patient need to receive the medication? 23   Refill Delivery Questions    How will the patient receive the medication? MEDRx   When does the patient need to receive the medication? 23   Shipping Address Home   Address in [facility] confirmed and updated if neccessary? Yes   Expected Copay ($) 77.38   Is the patient able to afford the medication copay? Yes   Payment Method new CC added to file   Days supply of Refill 30   Supplies needed? No supplies needed   Refill activity completed? Yes   Refill activity plan Refill scheduled   Shipment/Pickup Date: 23            Current Outpatient Medications   Medication Sig    abiraterone (ZYTIGA) 250 mg Tab Take 4 tablets (1,000 mg total) by mouth once daily.    amLODIPine (NORVASC) 5 MG tablet Take 1 tablet (5 mg total) by mouth once daily.    LAGEVRIO, EUA, 200 mg capsule (EUA) SMARTSI Capsule(s) By Mouth Every 12 Hours    leuprolide, 3 month, (ELIGARD, 3 MONTH,) 22.5 mg Syrg syringe Inject 22.5 mg  into the skin every 3 (three) months.    predniSONE (DELTASONE) 5 MG tablet Take 1 tablet by mouth once daily    ramipriL (ALTACE) 10 MG capsule Take 1 capsule (10 mg total) by mouth once daily.   Last reviewed on 6/25/2023  2:57 PM by Lisa Rivera NP    Review of patient's allergies indicates:  No Known Allergies Last reviewed on  6/25/2023 2:57 PM by Lisa Rivera      Tasks added this encounter   No tasks added.   Tasks due within next 3 months   7/20/2023 - Refill Coordination Outreach (1 time occurrence)     [facility]jo [facility] - Specialty Pharmacy  [address] [facility] [address]  Phone: [phone]  Fax: [phone]

## 2023-07-20 NOTE — TELEPHONE ENCOUNTER
Outgoing call regarding refill on pts Zytiga. Set up refill and informed pt [facility]  2023. Pt stated he will call [facility] back with new [facility]. Informed pt medication cannot be shipped until payment obtained.

## 2023-08-09 ENCOUNTER — TELEPHONE (OUTPATIENT)
Dept: HEMATOLOGY/ONCOLOGY | Facility: CLINIC | Age: 68
End: 2023-08-09

## 2023-08-11 ENCOUNTER — PATIENT MESSAGE (OUTPATIENT)
Dept: PHARMACY | Facility: CLINIC | Age: 68
End: 2023-08-11
Payer: MEDICARE

## 2023-08-14 ENCOUNTER — LAB VISIT (OUTPATIENT)
Dept: LAB | Facility: HOSPITAL | Age: 68
End: 2023-08-14
Attending: INTERNAL MEDICINE
Payer: MEDICARE

## 2023-08-14 ENCOUNTER — TELEPHONE (OUTPATIENT)
Dept: PHARMACY | Facility: CLINIC | Age: 68
End: 2023-08-14
Payer: MEDICARE

## 2023-08-14 DIAGNOSIS — C61 PROSTATE CANCER: ICD-10-CM

## 2023-08-14 DIAGNOSIS — D53.9 NUTRITIONAL ANEMIA, UNSPECIFIED: ICD-10-CM

## 2023-08-14 DIAGNOSIS — D64.9 ANEMIA, UNSPECIFIED TYPE: ICD-10-CM

## 2023-08-14 LAB
ALBUMIN SERPL BCP-MCNC: 4 G/DL (ref 3.5–5.2)
ALP SERPL-CCNC: 78 U/L (ref 55–135)
ALT SERPL W/O P-5'-P-CCNC: 25 U/L (ref 10–44)
ANION GAP SERPL CALC-SCNC: 5 MMOL/L (ref 8–16)
AST SERPL-CCNC: 24 U/L (ref 10–40)
BASOPHILS # BLD AUTO: 0.02 K/UL (ref 0–0.2)
BASOPHILS NFR BLD: 0.5 % (ref 0–1.9)
BILIRUB SERPL-MCNC: 1 MG/DL (ref 0.1–1)
BUN SERPL-MCNC: 19 MG/DL (ref 8–23)
CALCIUM SERPL-MCNC: 9.3 MG/DL (ref 8.7–10.5)
CHLORIDE SERPL-SCNC: 106 MMOL/L (ref 95–110)
CO2 SERPL-SCNC: 29 MMOL/L (ref 23–29)
COMPLEXED PSA SERPL-MCNC: <0.01 NG/ML (ref 0–4)
CREAT SERPL-MCNC: 0.9 MG/DL (ref 0.5–1.4)
DIFFERENTIAL METHOD: ABNORMAL
EOSINOPHIL # BLD AUTO: 0.1 K/UL (ref 0–0.5)
EOSINOPHIL NFR BLD: 1.7 % (ref 0–8)
ERYTHROCYTE [DISTWIDTH] IN BLOOD BY AUTOMATED COUNT: 14.3 % (ref 11.5–14.5)
EST. GFR  (NO RACE VARIABLE): >60 ML/MIN/1.73 M^2
FERRITIN SERPL-MCNC: 145 NG/ML (ref 20–300)
FOLATE SERPL-MCNC: >24.8 NG/ML (ref 4–24)
GLUCOSE SERPL-MCNC: 105 MG/DL (ref 70–110)
HCT VFR BLD AUTO: 39.7 % (ref 40–54)
HGB BLD-MCNC: 13.2 G/DL (ref 14–18)
IMM GRANULOCYTES # BLD AUTO: 0.01 K/UL (ref 0–0.04)
IMM GRANULOCYTES NFR BLD AUTO: 0.2 % (ref 0–0.5)
IRON SERPL-MCNC: 121 UG/DL (ref 45–160)
LYMPHOCYTES # BLD AUTO: 0.8 K/UL (ref 1–4.8)
LYMPHOCYTES NFR BLD: 18.7 % (ref 18–48)
MCH RBC QN AUTO: 31.4 PG (ref 27–31)
MCHC RBC AUTO-ENTMCNC: 33.2 G/DL (ref 32–36)
MCV RBC AUTO: 94 FL (ref 82–98)
MONOCYTES # BLD AUTO: 0.4 K/UL (ref 0.3–1)
MONOCYTES NFR BLD: 10.2 % (ref 4–15)
NEUTROPHILS # BLD AUTO: 2.8 K/UL (ref 1.8–7.7)
NEUTROPHILS NFR BLD: 68.7 % (ref 38–73)
NRBC BLD-RTO: 0 /100 WBC
PLATELET # BLD AUTO: 187 K/UL (ref 150–450)
PMV BLD AUTO: 10.9 FL (ref 9.2–12.9)
POTASSIUM SERPL-SCNC: 3.9 MMOL/L (ref 3.5–5.1)
PROT SERPL-MCNC: 7.1 G/DL (ref 6–8.4)
RBC # BLD AUTO: 4.21 M/UL (ref 4.6–6.2)
SATURATED IRON: 32 % (ref 20–50)
SODIUM SERPL-SCNC: 140 MMOL/L (ref 136–145)
TOTAL IRON BINDING CAPACITY: 382 UG/DL (ref 250–450)
TRANSFERRIN SERPL-MCNC: 273 MG/DL (ref 200–375)
VIT B12 SERPL-MCNC: 544 PG/ML (ref 210–950)
WBC # BLD AUTO: 4.12 K/UL (ref 3.9–12.7)

## 2023-08-14 PROCEDURE — 82728 ASSAY OF FERRITIN: CPT | Performed by: INTERNAL MEDICINE

## 2023-08-14 PROCEDURE — 80053 COMPREHEN METABOLIC PANEL: CPT | Performed by: INTERNAL MEDICINE

## 2023-08-14 PROCEDURE — 84466 ASSAY OF TRANSFERRIN: CPT | Performed by: INTERNAL MEDICINE

## 2023-08-14 PROCEDURE — 82746 ASSAY OF FOLIC ACID SERUM: CPT | Performed by: INTERNAL MEDICINE

## 2023-08-14 PROCEDURE — 84153 ASSAY OF PSA TOTAL: CPT | Performed by: INTERNAL MEDICINE

## 2023-08-14 PROCEDURE — 82607 VITAMIN B-12: CPT | Performed by: INTERNAL MEDICINE

## 2023-08-14 PROCEDURE — 36415 COLL VENOUS BLD VENIPUNCTURE: CPT | Performed by: INTERNAL MEDICINE

## 2023-08-14 PROCEDURE — 85025 COMPLETE CBC W/AUTO DIFF WBC: CPT | Performed by: INTERNAL MEDICINE

## 2023-08-14 NOTE — TELEPHONE ENCOUNTER
Pt returning call regarding refill. Pt reports has 2 weeks remaining on hand. Agreed for a callback next week.

## 2023-08-16 NOTE — PROGRESS NOTES
[facility] Hematology/Oncology  PROGRESS NOTE - Follow-up Visit      Subjective:       Patient ID:   NAME: Rusty Sen II : 1955     68 y.o. male    Referring Doc: Jamie Martin Jr., MD  Other Physicians: Matheus Sofia           Chief Complaint: prostate cancer f/u       History of Present Illness:     Patient returns today for a regularly scheduled follow-up visit.  The patient is here today to go over the results of the recently ordered labs, tests and studies. He is here by himself. Some stress at home with wife who had hip surgery with some post-op complications.      He previously had completed XRT and he is continued on zytiga/ADT. He denies any current fatigue. He has been back to the gym 3x/week    Prior bout of proctitis resolved - followed by Dr Cornejo with GI    He continues ADT every 3 months    He last saw Dr Sofia on 2023; BP meds have been adjusted    Breathing ok, No CP, SOB, HA's or N/V;                      ROS:   GEN: normal without any fever, night sweats or weight loss  HEENT: normal with no HA's, sore throat, stiff neck, changes in vision  CV: normal with no CP, SOB, PND, FAIRCHILD or orthopnea  PULM: normal with no SOB, cough, hemoptysis, sputum or pleuritic pain  GI: normal with no abdominal pain, nausea, vomiting, constipation, diarrhea, melanotic stools, BRBPR, or hematemesis  : normal with no hematuria, dysuria  BREAST: normal with no mass, discharge, pain  SKIN: normal with no rash, erythema, bruising, or swelling    Pain Scale:  0  Allergies:  Review of patient's allergies indicates:  No Known Allergies    Medications:    Current Outpatient Medications:     abiraterone (ZYTIGA) 250 mg Tab, Take 4 tablets (1,000 mg total) by mouth once daily., Disp: 120 tablet, Rfl: 11    amLODIPine (NORVASC) 5 MG tablet, Take 1 tablet (5 mg total) by mouth once daily., Disp: 90 tablet, Rfl: 3    LAGEVRIO, EUA, 200 mg capsule (EUA), SMARTSI Capsule(s) By Mouth Every 12 Hours,  Disp: , Rfl:     predniSONE (DELTASONE) 5 MG tablet, Take 1 tablet by mouth once daily, Disp: 90 tablet, Rfl: 0    ramipriL (ALTACE) 10 MG capsule, Take 1 capsule (10 mg total) by mouth once daily., Disp: 90 capsule, Rfl: 3    leuprolide, 3 month, (ELIGARD, 3 MONTH,) 22.5 mg Syrg syringe, Inject 22.5 mg into the skin every 3 (three) months., Disp: 1 each, Rfl: 3    PMHx/PSHx Updates:  See patient's last visit with me on  5/11/2023  See H&P on 5/16/2022        Pathology:   Cancer Staging   Prostate cancer  Staging form: Prostate, AJCC 8th Edition  - Clinical: Stage IIIC (cT2a, cN0, cM0, PSA: 23.7, Grade Group: 5) - Signed by Jamie Martin Jr., MD on 5/6/2022      CT abdom/pelvis  4/2/7/2022:     IMPRESSION: Ovoid sclerotic lesions at L5, left-sided sacrum and left superior pubic ramus most likely representing bone island however metastatic disease cannot be excluded and correlation with bone scan is recommended     Levoscoliosis with multilevel degenerative disc disease and facet hypertrophy     Sigmoid diverticulosis without diverticulitis        Bone scan 4/27/2022:     IMPRESSION: Increased activity in joint central distribution as well as the spine compatible with degenerative changes     No evidence of metastatic disease     The abnormality seen on CT most likely represent bone islands     MRI prostate  5/12/2022:      Impression:     1. PIRADS 5.  Large lesion in the right peripheral zone with clinically significant cancer highly likely to be present.  Bulging of the prostate capsule raises the possibility of extracapsular extension.  2. Lesion is in close proximity to the right neurovascular bundle and also extends into the right seminal vesicles.  3. There are prominent but nonenlarged mesorectal lymph nodes which are indeterminate as to the possibility of metastatic disease and warrant continued attention on follow-up imaging.  4. There is also an indeterminate sclerotic bone lesion in the left pubic  "body.  5. Benign prostatic hypertrophy.  Overall Assessment:     PIRADS 1 (clinically significant cancer is highly unlikely to be present)     PIRADS 2 (clinically significant cancer is unlikely to be present)     PIRADS 3 (the presence of clinically significant cancer is equivocal)     PIRADS 4 (clinically significant cancer is likely to be present)     PIRADS 5 (clinically significant cancer is highly likely to be present)     Number of targets created for potential MR/US fusion biopsy     Peripheral zone: 1     Transition zone: 0           Objective:     Vitals:  Blood pressure 130/71, pulse (!) 56, temperature 98.1 °F (36.7 °C), resp. rate 16, height 5' 6" (1.676 m), weight 77.7 kg (171 lb 4.8 oz).    Physical Examination:   GEN: no apparent distress, comfortable; AAOx3  HEAD: atraumatic and normocephalic  EYES: no pallor, no icterus, PERRLA  ENT: OMM, no pharyngeal erythema, external ears WNL; no nasal discharge; no thrush  NECK: no masses, thyroid normal, trachea midline, no LAD/LN's, supple  CV: RRR with no murmur; normal pulse; normal S1 and S2; no pedal edema  CHEST: Normal respiratory effort; CTAB; normal breath sounds; no wheeze or crackles  ABDOM: nontender and nondistended; soft; normal bowel sounds; no rebound/guarding  MUSC/Skeletal: ROM normal; no crepitus; joints normal; no deformities or arthropathy  EXTREM: no clubbing, cyanosis, inflammation or swelling  SKIN: no rashes, lesions, ulcers, petechiae or subcutaneous nodules  : no florez  NEURO: grossly intact; motor/sensory WNL; AAOx3; no tremors  PSYCH: normal mood, affect and behavior  LYMPH: normal cervical, supraclavicular, axillary and groin LN's            Labs:     Lab Results   Component Value Date    WBC 4.12 08/14/2023    HGB 13.2 (L) 08/14/2023    HCT 39.7 (L) 08/14/2023    MCV 94 08/14/2023     08/14/2023       CMP  Sodium   Date Value Ref Range Status   08/14/2023 140 136 - 145 mmol/L Final     Potassium   Date Value Ref Range " Status   08/14/2023 3.9 3.5 - 5.1 mmol/L Final     Chloride   Date Value Ref Range Status   08/14/2023 106 95 - 110 mmol/L Final     CO2   Date Value Ref Range Status   08/14/2023 29 23 - 29 mmol/L Final     Glucose   Date Value Ref Range Status   08/14/2023 105 70 - 110 mg/dL Final     BUN   Date Value Ref Range Status   08/14/2023 19 8 - 23 mg/dL Final     Creatinine   Date Value Ref Range Status   08/14/2023 0.9 0.5 - 1.4 mg/dL Final     Calcium   Date Value Ref Range Status   08/14/2023 9.3 8.7 - 10.5 mg/dL Final     Total Protein   Date Value Ref Range Status   08/14/2023 7.1 6.0 - 8.4 g/dL Final     Albumin   Date Value Ref Range Status   08/14/2023 4.0 3.5 - 5.2 g/dL Final     Total Bilirubin   Date Value Ref Range Status   08/14/2023 1.0 0.1 - 1.0 mg/dL Final     Comment:     For infants and newborns, interpretation of results should be based  on gestational age, weight and in agreement with clinical  observations.    Premature Infant recommended reference ranges:  Up to 24 hours.............<8.0 mg/dL  Up to 48 hours............<12.0 mg/dL  3-5 days..................<15.0 mg/dL  6-29 days.................<15.0 mg/dL       Alkaline Phosphatase   Date Value Ref Range Status   08/14/2023 78 55 - 135 U/L Final     AST   Date Value Ref Range Status   08/14/2023 24 10 - 40 U/L Final     ALT   Date Value Ref Range Status   08/14/2023 25 10 - 44 U/L Final     Anion Gap   Date Value Ref Range Status   08/14/2023 5 (L) 8 - 16 mmol/L Final     eGFR if    Date Value Ref Range Status   07/05/2022 >60.0 >60 mL/min/1.73 m^2 Final     eGFR if non    Date Value Ref Range Status   07/05/2022 >60.0 >60 mL/min/1.73 m^2 Final     Comment:     Calculation used to obtain the estimated glomerular filtration  rate (eGFR) is the CKD-EPI equation.        Lab Results   Component Value Date    IRON 121 08/14/2023    TRANSFERRIN 273 08/14/2023    TIBC 382 08/14/2023    FESATURATED 32 08/14/2023      Lab  Results   Component Value Date    FERRITIN 145 08/14/2023     PSA Diagnostic 0.00 - 4.00 ng/mL <0.01          Lab Results   Component Value Date    [id number] 544 08/14/2023     Lab Results   Component Value Date    FOLATE >24.8 (H) 08/14/2023             Radiology/Diagnostic Studies:    CXR 2/16/2023:  IMPRESSION:  1. No acute radiographic abnormalities or detrimental changes compared to September 2021.        I have reviewed all available lab results and radiology reports.    Assessment/Plan:   (1) 68 y.o. male with diagnosis of prostate cancer who has been referred by Jamie Martin Jr., MD for evaluation by medical hematology/oncology. 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 He was recently diagnosed with prostate cancer after having been found to have an elevated PSA at 23.7. He underwent a transrectal US with biopsy with Dr Walter with  on 4/18/2022.       - Pathology came back showing adenocarcinoma in 5 out of 8 cores with: 80%  RB [place] 4+5 (Group 5), 70% RM [place] 3+4 (Group 3); 30% RA [place] 3 + 4 (Group 2); and 25% LB [place] 3 + 3.   - perineural invasion per path report; [place] 4+5  - He had a CT abdomen pelvis on 4/27 which showed sclerotic lesions at L5, left sacrum and left superior pubic ramus with differential of bone island versus metastatic disease, however, the bone scan on 4/27/2022 was negative.   - Stage IIIC (cT2a N0 M0) per Dr Martin's assessment     - reviewed and discussed the pathology and the latest NCCN guidelines (vers. 4.2022)  - he is considered high/very high risk category given the NCCN criteria  - already started on Eligard ADT about 3 weeks ago  - discussed benefit with addition of abiraterone (Zytiga)  - patient wants to be aggressive with his therapy  - will set up with chemotherapy school; discuss the side-effect profile of the drugs and provide literature on the drugs    6/22/2022:  - he is now on the abiraterone and XRT with ADT  - he is on week #4 of the  XRT  - slight drop in hgb and plats but no unexpected  - some diarrhea but very mild    8/17/2022:  - He completed XRT about 2-3 weeks ago and his symptoms improved after about 7-10 days;  - he is continued on zytiga/ADT. He had some recent labs.   - He had eligard injection last Wednesday and continues ADT every 3 months    11/16/2022:  - he is continued on the zytiga and ADT  - some fatigue  - some increase in BP -  I instructed him to monitor his BP at home and if it continues to run high then f/u with Dr Sofia sooner than March 2023 2/16/2023:  - continued on zytiga/pred and ADT  - PSA remains <0.01    5/11/2023:  - doing ok with no new issues  - PSA <0.01  - continued on the zytiga and ADT    8/17/2023:  - continued on ADT and zytiga  - PSa remains <0.01  - mild borderline anemia with normal iron panel, B1q2 and folate          (2) Family Hx/of prostate cancer (dad), breast ca (sister), lymphoma (dad)     (3) Hx/of left rotator cuff/acromioplasty surgery - Spet 2021 with Dr Craven     (4) GERD - prior colonoscopy with Dr Cornejo with GI in Jan 2023     (5) History of positive PPD - recent TB Gold positive - will get CXR - Dr Sofia is aware    VISIT DIAGNOSES:      Prostate cancer          PLAN:  1. continued on zytiga; s/p chemotherapy school for the Zytiga; discuss the side-effect profile, provided literature on the drug and obtain consents  2  continue ADT; check labs every 3 months  - sees  again next month  3. BP Management as per primary  4. F/u with PCP, , Rad/onc as directed  5. RTC in 3 months     Fax note to Jamie Martin Jr., MD; Kimberlyn Walter Ke[place], [place]       Discussion:     COVID-19 Discussion:    I had long discussion with patient and any applicable family about the COVID-19 coronavirus epidemic and the recommended precautions with regard to cancer and/or hematology patients. I have re-iterated the CDC recommendations for adequate hand washing, use of hand  "-like products, and coughing into elbow, etc. In addition, especially for our patients who are on chemotherapy and/or our otherwise immunocompromised patients, I have recommended avoidance of crowds, including movie theaters, restaurants, churches, etc. I have recommended avoidance of any sick or symptomatic family members and/or friends. I have also recommended avoidance of any raw and unwashed food products, and general avoidance of food items that have not been prepared by themselves. The patient has been asked to call us immediately with any symptom developments, issues, questions or other general concerns.       Pathology Discussion:    I reviewed and discussed the pathology report(s) and radiograph reports (if available) in as simple to understand and/or laymen's terms to the best of my ability. I had an indepth conversation with the patient and went over the patient's individual diagnosis based on the information that was currently available. I discussed the TNM staging process with regard to the patient's particular cancer type, and the calculated stage based on the currently available TNM data and literature. I discussed the available prognostic data with regard to the current staging information and how it relates to the prognosis of their particular neoplastic process.        NCCN Guidelines:    I discussed the available treatment option(s) in accordance with the latest literature from the NCCN Clinical Practice Guidelines for the patient's particular type of cancer disorder. The NCCN Guidelines provide a "document evidence-based (and) consensus-driven management" of the care of oncology patients. The treatment recommendations were made not only in accordance to the NCCN guidelines, but also factored in to account the patient's overall age, condition, performance status and their medical co-morbidities. I went over the risks and benefits of the the treatment options (if any could be made) with " regard to their particular cancer type, their cancer stage, their age, and their co-morbidities.       Chemotherapy Discussion:      I discussed the available treatment option(s) in accordance with the latest/current national evidence-based guidelines (NCCN, UpToDate, NCI, ASCO, etc where applicable), their overall age/condition and their co-morbidities. I also went over the risks and benefits of the chemotherapy with regard to their particular cancer type, their cancer stage, their age/condition, and their co-morbidities. I provided literature on the chemotherapy regimen and discussed the chemotherapy side-effect profiles of the drug(s). I discussed the importance of compliance with obtaining and monitoring weekly lab work, and went over the potential hematopathology issues and risks with anemia, leucopenia and thrombocytopenia that can occur with chemotherapy. I discussed the potential risks of liver and kidney damage, which could be permanent and could necessitate dialysis long-term if kidney failure developed. I discussed the emetic and/or diarrheal potential of the regimen and the potential need for use of antiemetic and anti-diarrheal medications. I discussed the risk for development of anaphylactic shock, bronchospasm, dysrhythmia, and respiratory/cardiovascular arrest and/or failure. I discussed the potential risks for development of alopecia, cold sensory issues, ringing in ears, vertigo, cataracts, glaucoma, and neuropathy, all of which could end up being chronic and life-long. Some chemotherpyI discussed the risks of hand-foot syndrome and rashes, and development of other autoimmune mediated processes such as pneumonitis, hepatitis, and colitis which could be life threatening. I discussed the risks of the potential development of a rare but fatal viral mediated disease known as PML (Progressive Multifocal Leukoencephalopathy), and risk of future development of leukemia and/or lymphoma from use of certain  chemotherapy agents. I discussed the need for neutropenic precautions, basic hygiene/sanitation behaviors and dietary restrictions.    The patient's consent has been obtained to proceed with the chemotherapy.The patient will be referred to Chemotherapy School /[facility] for training and education on chemotherapy, use of antiemetics and/or anti-diarrheals, use of NSAID's, potential chemotherapy side-effects, and any specific recommendations and precautions with the particular chemotherapy agents.      I answered all of the patient's (and family's, if applicable) questions to the best of my ability and to their complete satisfaction. The patient acknowledged full understanding of the risks, recommendations and plan(s).     I spent over 25 mins of time with the patient. Reviewed results of the recently ordered labs, tests and studies; made directives with regards to the results. Over half of this time was spent couseling and coordinating care.    I have explained all of the above in detail and the patient understands all of the current recommendation(s). I have answered all of their questions to the best of my ability and to their complete satisfaction.   The patient is to continue with the current management plan.            Electronically signed by Kip Wright MD

## 2023-08-17 ENCOUNTER — OFFICE VISIT (OUTPATIENT)
Dept: HEMATOLOGY/ONCOLOGY | Facility: CLINIC | Age: 68
End: 2023-08-17
Payer: MEDICARE

## 2023-08-17 ENCOUNTER — TELEPHONE (OUTPATIENT)
Dept: HEMATOLOGY/ONCOLOGY | Facility: CLINIC | Age: 68
End: 2023-08-17

## 2023-08-17 VITALS
RESPIRATION RATE: 16 BRPM | DIASTOLIC BLOOD PRESSURE: 71 MMHG | TEMPERATURE: 98 F | HEIGHT: 66 IN | BODY MASS INDEX: 27.53 KG/M2 | SYSTOLIC BLOOD PRESSURE: 130 MMHG | HEART RATE: 56 BPM | WEIGHT: 171.31 LBS

## 2023-08-17 DIAGNOSIS — C61 PROSTATE CANCER: Primary | ICD-10-CM

## 2023-08-17 DIAGNOSIS — D64.9 ANEMIA, UNSPECIFIED TYPE: ICD-10-CM

## 2023-08-17 PROCEDURE — 99214 PR OFFICE/OUTPT VISIT, EST, LEVL IV, 30-39 MIN: ICD-10-PCS | Mod: S$GLB,,, | Performed by: INTERNAL MEDICINE

## 2023-08-17 PROCEDURE — 99214 OFFICE O/P EST MOD 30 MIN: CPT | Mod: S$GLB,,, | Performed by: INTERNAL MEDICINE

## 2023-08-21 ENCOUNTER — SPECIALTY PHARMACY (OUTPATIENT)
Dept: PHARMACY | Facility: CLINIC | Age: 68
End: 2023-08-21
Payer: MEDICARE

## 2023-08-21 NOTE — TELEPHONE ENCOUNTER
Specialty Pharmacy - Refill Coordination    Specialty Medication Orders Linked to Encounter      Flowsheet Row Most Recent Value   Medication #1 abiraterone (ZYTIGA) 250 mg Tab (Order#[id number], Rx#[id number])            Refill Questions - Documented Responses      Flowsheet Row Most Recent Value   Patient Availability and HIPAA Verification    Does patient want to proceed with activity? Yes   HIPAA/medical authority confirmed? Yes   Relationship to patient of person spoken to? Self   Refill Screening Questions    Changes to allergies? No   Changes to medications? No   New conditions since last clinic visit? No   Unplanned office visit, urgent care, ED, or hospital admission in the last 4 weeks? No   How does patient/caregiver feel medication is working? Good   Financial problems or insurance changes? No   How many doses of your specialty medications were missed in the last 4 weeks? 0   Would patient like to speak to a pharmacist? No   When does the patient need to receive the medication? 23   Refill Delivery Questions    How will the patient receive the medication? [facility]   When does the patient need to receive the medication? 23   Shipping Address Home   Address in [facility] confirmed and updated if neccessary? Yes   Expected Copay ($) 77.38   Is the patient able to afford the medication copay? Yes   Payment Method CC on file   Days supply of Refill 30   Supplies needed? No supplies needed   Refill activity completed? Yes   Refill activity plan Refill scheduled   Shipment/Pickup Date: 23            Current Outpatient Medications   Medication Sig    abiraterone (ZYTIGA) 250 mg Tab Take 4 tablets (1,000 mg total) by mouth once daily.    amLODIPine (NORVASC) 5 MG tablet Take 1 tablet (5 mg total) by mouth once daily.    LAGEVRIO, EUA, 200 mg capsule (EUA) SMARTSI Capsule(s) By Mouth Every 12 Hours    leuprolide, 3 month, (ELIGARD, 3 MONTH,) 22.5 mg Syrg syringe Inject 22.5 mg into the  skin every 3 (three) months.    predniSONE (DELTASONE) 5 MG tablet Take 1 tablet by mouth once daily    ramipriL (ALTACE) 10 MG capsule Take 1 capsule (10 mg total) by mouth once daily.   Last reviewed on 8/17/2023  9:10 AM by Kip Wright MD    Review of patient's allergies indicates:  No Known Allergies Last reviewed on  8/17/2023 9:10 AM by Kip Wright      Tasks added this encounter   No tasks added.   Tasks due within next 3 months   11/14/2023 - Clinical Assessment (6 month recurrence)     Karly Payne, PharmD  [facility]  [address] [place] [id number]  Phone: [phone]  Fax: [phone]

## 2023-09-05 DIAGNOSIS — C61 PROSTATE CANCER: ICD-10-CM

## 2023-09-05 RX ORDER — PREDNISONE 5 MG/1
5 TABLET ORAL DAILY
Qty: 90 TABLET | Refills: 1 | Status: SHIPPED | OUTPATIENT
Start: 2023-09-05 | End: 2024-02-29

## 2023-10-04 ENCOUNTER — TELEPHONE (OUTPATIENT)
Dept: FAMILY MEDICINE | Facility: CLINIC | Age: 68
End: 2023-10-04
Payer: MEDICARE

## 2023-10-04 NOTE — TELEPHONE ENCOUNTER
Patient called back- he had covid about 3 months ago- he is asking if Dr. Sofia recommends he get the current covid vaccine booster. Also wanted to know about rsv vaccine. I explained it's important if he would be exposed to newborns or pregnant women.     He will get the a1c lab done when he does his next 3 month lab for Dr. Wright.

## 2023-10-04 NOTE — TELEPHONE ENCOUNTER
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 Sofia about his lab work.  Best Call Back Number:  [phone]  Additional Information:  Please call back at the phone number listed above to advise. Thank you!

## 2023-10-04 NOTE — TELEPHONE ENCOUNTER
I do not think he needs the RSV. Only upper respiratory symptoms in adults.  As far as Covid vaccine, there is a new booster out now. Whether he should take it or not is a personal choice. The vaccines have been somewhat effective in reducing Covid infection but not as much as hoped for or advertised. The natural covid antibodies from an acute infection are better than any vaccine and tend to last 6-12 months. The vast majority of Covid infections that we see now are just cold or flu type symptoms. He is generally healthy and lower risk except for the chemotherapy. Again, his choice.

## 2023-10-20 DIAGNOSIS — C61 PROSTATE CANCER: ICD-10-CM

## 2023-10-20 DIAGNOSIS — I15.8 OTHER SECONDARY HYPERTENSION: ICD-10-CM

## 2023-10-20 RX ORDER — AMLODIPINE BESYLATE 5 MG/1
5 TABLET ORAL DAILY
Qty: 90 TABLET | Refills: 1 | Status: SHIPPED | OUTPATIENT
Start: 2023-10-20 | End: 2024-04-17

## 2023-10-20 RX ORDER — RAMIPRIL 10 MG/1
10 CAPSULE ORAL DAILY
Qty: 90 CAPSULE | Refills: 1 | Status: SHIPPED | OUTPATIENT
Start: 2023-10-20 | End: 2024-04-17

## 2023-10-20 NOTE — TELEPHONE ENCOUNTER
----- Message from Arturo Witt sent at 10/20/2023  9:49 AM CDT -----  Contact: self  Type: Needs Medical Advice  Who Called:  Patient    Best Call Back Number: [phone]    Additional Information: Pt states he would like to speak with office.Please call back

## 2023-10-20 NOTE — TELEPHONE ENCOUNTER
Refill Decision Note   Rusty Sen  is requesting a refill authorization.  Brief Assessment and Rationale for Refill:  Approve     Medication Therapy Plan:         Comments:     Note composed:10:22 AM 10/20/2023

## 2023-10-20 NOTE — TELEPHONE ENCOUNTER
No care due was identified.  [facility] Embedded Care Due Messages. Reference number: [id number].   10/20/2023 10:11:20 AM CDT

## 2023-11-10 ENCOUNTER — LAB VISIT (OUTPATIENT)
Dept: LAB | Facility: HOSPITAL | Age: 68
End: 2023-11-10
Attending: FAMILY MEDICINE
Payer: MEDICARE

## 2023-11-10 DIAGNOSIS — R73.9 HYPERGLYCEMIA: ICD-10-CM

## 2023-11-10 LAB
ESTIMATED AVG GLUCOSE: 134 MG/DL (ref 68–131)
HBA1C MFR BLD: 6.3 % (ref 4.5–6.2)

## 2023-11-10 PROCEDURE — 36415 COLL VENOUS BLD VENIPUNCTURE: CPT | Performed by: FAMILY MEDICINE

## 2023-11-10 PROCEDURE — 83036 HEMOGLOBIN GLYCOSYLATED A1C: CPT | Performed by: FAMILY MEDICINE

## 2023-11-13 DIAGNOSIS — I15.8 OTHER SECONDARY HYPERTENSION: Primary | ICD-10-CM

## 2023-11-13 RX ORDER — EPLERENONE 25 MG/1
25 TABLET, FILM COATED ORAL DAILY
Qty: 30 TABLET | Refills: 11 | Status: SHIPPED | OUTPATIENT
Start: 2023-11-13 | End: 2023-12-11 | Stop reason: SDUPTHER

## 2023-11-15 NOTE — PROGRESS NOTES
[facility] Hematology/Oncology  PROGRESS NOTE - Follow-up Visit      Subjective:       Patient ID:   NAME: Rusty Sen II : 1955     68 y.o. male    Referring Doc: Jamie Martin Jr., MD  Other Physicians: Matheus Sofia           Chief Complaint: prostate cancer f/u       History of Present Illness:     Patient returns today for a regularly scheduled follow-up visit.  The patient is here today to go over the results of the recently ordered labs, tests and studies. He is here by himself.        He previously had completed XRT and he is continued on zytiga/ADT. He denies any current fatigue. He has been back to the gym 3x/week    Some BP issues recently and he spoke to Dr Sofia recently and adjusted his meds and he is starting Inspra    He continues ADT every 3 months    Breathing ok, No CP, SOB, HA's or N/V;                      ROS:   GEN: normal without any fever, night sweats or weight loss  HEENT: normal with no HA's, sore throat, stiff neck, changes in vision  CV: normal with no CP, SOB, PND, FAIRCHILD or orthopnea  PULM: normal with no SOB, cough, hemoptysis, sputum or pleuritic pain  GI: normal with no abdominal pain, nausea, vomiting, constipation, diarrhea, melanotic stools, BRBPR, or hematemesis  : normal with no hematuria, dysuria  BREAST: normal with no mass, discharge, pain  SKIN: normal with no rash, erythema, bruising, or swelling    Pain Scale:  0  Allergies:  Review of patient's allergies indicates:  No Known Allergies    Medications:    Current Outpatient Medications:     abiraterone (ZYTIGA) 250 mg Tab, Take 4 tablets (1,000 mg total) by mouth once daily., Disp: 120 tablet, Rfl: 11    amLODIPine (NORVASC) 5 MG tablet, Take 1 tablet (5 mg total) by mouth once daily., Disp: 90 tablet, Rfl: 1    eplerenone (INSPRA) 25 MG Tab, Take 1 tablet (25 mg total) by mouth once daily., Disp: 30 tablet, Rfl: 11    predniSONE (DELTASONE) 5 MG tablet, Take 1 tablet (5 mg total) by mouth once  daily., Disp: 90 tablet, Rfl: 1    ramipriL (ALTACE) 10 MG capsule, Take 1 capsule (10 mg total) by mouth once daily., Disp: 90 capsule, Rfl: 1    leuprolide, 3 month, (ELIGARD, 3 MONTH,) 22.5 mg Syrg syringe, Inject 22.5 mg into the skin every 3 (three) months., Disp: 1 each, Rfl: 3    PMHx/PSHx Updates:  See patient's last visit with me on  8/17/2023  See H&P on 5/16/2022        Pathology:   Cancer Staging   Prostate cancer  Staging form: Prostate, AJCC 8th Edition  - Clinical: Stage IIIC (cT2a, cN0, cM0, PSA: 23.7, Grade Group: 5) - Signed by Jamie Martin Jr., MD on 5/6/2022      CT abdom/pelvis  4/2/7/2022:     IMPRESSION: Ovoid sclerotic lesions at L5, left-sided sacrum and left superior pubic ramus most likely representing bone island however metastatic disease cannot be excluded and correlation with bone scan is recommended     Levoscoliosis with multilevel degenerative disc disease and facet hypertrophy     Sigmoid diverticulosis without diverticulitis        Bone scan 4/27/2022:     IMPRESSION: Increased activity in joint central distribution as well as the spine compatible with degenerative changes     No evidence of metastatic disease     The abnormality seen on CT most likely represent bone islands     MRI prostate  5/12/2022:      Impression:     1. PIRADS 5.  Large lesion in the right peripheral zone with clinically significant cancer highly likely to be present.  Bulging of the prostate capsule raises the possibility of extracapsular extension.  2. Lesion is in close proximity to the right neurovascular bundle and also extends into the right seminal vesicles.  3. There are prominent but nonenlarged mesorectal lymph nodes which are indeterminate as to the possibility of metastatic disease and warrant continued attention on follow-up imaging.  4. There is also an indeterminate sclerotic bone lesion in the left pubic body.  5. Benign prostatic hypertrophy.  Overall Assessment:     PIRADS 1 (clinically  significant cancer is highly unlikely to be present)     PIRADS 2 (clinically significant cancer is unlikely to be present)     PIRADS 3 (the presence of clinically significant cancer is equivocal)     PIRADS 4 (clinically significant cancer is likely to be present)     PIRADS 5 (clinically significant cancer is highly likely to be present)     Number of targets created for potential MR/US fusion biopsy     Peripheral zone: 1     Transition zone: 0           Objective:     Vitals:  Blood pressure 129/75, pulse (!) 57, temperature 97.7 °F (36.5 °C), resp. rate 16, weight 79.6 kg (175 lb 8 oz).    Physical Examination:   GEN: no apparent distress, comfortable; AAOx3  HEAD: atraumatic and normocephalic  EYES: no pallor, no icterus, PERRLA  ENT: OMM, no pharyngeal erythema, external ears WNL; no nasal discharge; no thrush  NECK: no masses, thyroid normal, trachea midline, no LAD/LN's, supple  CV: RRR with no murmur; normal pulse; normal S1 and S2; no pedal edema  CHEST: Normal respiratory effort; CTAB; normal breath sounds; no wheeze or crackles  ABDOM: nontender and nondistended; soft; normal bowel sounds; no rebound/guarding  MUSC/Skeletal: ROM normal; no crepitus; joints normal; no deformities or arthropathy  EXTREM: no clubbing, cyanosis, inflammation or swelling  SKIN: no rashes, lesions, ulcers, petechiae or subcutaneous nodules  : no florez  NEURO: grossly intact; motor/sensory WNL; AAOx3; no tremors  PSYCH: normal mood, affect and behavior  LYMPH: normal cervical, supraclavicular, axillary and groin LN's            Labs:     Lab Results   Component Value Date    WBC 4.29 11/10/2023    HGB 13.3 (L) 11/10/2023    HCT 40.0 11/10/2023    MCV 95 11/10/2023     11/10/2023       CMP  Sodium   Date Value Ref Range Status   11/10/2023 140 136 - 145 mmol/L Final     Potassium   Date Value Ref Range Status   11/10/2023 3.9 3.5 - 5.1 mmol/L Final     Chloride   Date Value Ref Range Status   11/10/2023 104 95 - 110  mmol/L Final     CO2   Date Value Ref Range Status   11/10/2023 32 (H) 23 - 29 mmol/L Final     Glucose   Date Value Ref Range Status   11/10/2023 111 (H) 70 - 110 mg/dL Final     BUN   Date Value Ref Range Status   11/10/2023 20 8 - 23 mg/dL Final     Creatinine   Date Value Ref Range Status   11/10/2023 0.9 0.5 - 1.4 mg/dL Final     Calcium   Date Value Ref Range Status   11/10/2023 9.9 8.7 - 10.5 mg/dL Final     Total Protein   Date Value Ref Range Status   11/10/2023 6.8 6.0 - 8.4 g/dL Final     Albumin   Date Value Ref Range Status   11/10/2023 4.2 3.5 - 5.2 g/dL Final     Total Bilirubin   Date Value Ref Range Status   11/10/2023 0.9 0.1 - 1.0 mg/dL Final     Comment:     For infants and newborns, interpretation of results should be based  on gestational age, weight and in agreement with clinical  observations.    Premature Infant recommended reference ranges:  Up to 24 hours.............<8.0 mg/dL  Up to 48 hours............<12.0 mg/dL  3-5 days..................<15.0 mg/dL  6-29 days.................<15.0 mg/dL       Alkaline Phosphatase   Date Value Ref Range Status   11/10/2023 88 55 - 135 U/L Final     AST   Date Value Ref Range Status   11/10/2023 21 10 - 40 U/L Final     ALT   Date Value Ref Range Status   11/10/2023 21 10 - 44 U/L Final     Anion Gap   Date Value Ref Range Status   11/10/2023 4 (L) 8 - 16 mmol/L Final     eGFR if    Date Value Ref Range Status   07/05/2022 >60.0 >60 mL/min/1.73 m^2 Final     eGFR if non    Date Value Ref Range Status   07/05/2022 >60.0 >60 mL/min/1.73 m^2 Final     Comment:     Calculation used to obtain the estimated glomerular filtration  rate (eGFR) is the CKD-EPI equation.        Lab Results   Component Value Date    IRON 121 08/14/2023    TRANSFERRIN 273 08/14/2023    TIBC 382 08/14/2023    FESATURATED 32 08/14/2023      Lab Results   Component Value Date    FERRITIN 145 08/14/2023       11/10/2023  PSA Diagnostic 0.00 - 4.00 ng/mL  <0.01          Lab Results   Component Value Date    SCOZMFUT51 544 08/14/2023     Lab Results   Component Value Date    FOLATE >24.8 (H) 08/14/2023             Radiology/Diagnostic Studies:    CXR 2/16/2023:  IMPRESSION:  1. No acute radiographic abnormalities or detrimental changes compared to September 2021.        I have reviewed all available lab results and radiology reports.    Assessment/Plan:   (1) 68 y.o. male with diagnosis of prostate cancer who has been referred by Jamie Martin Jr., MD for evaluation by medical hematology/oncology. 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 He was recently diagnosed with prostate cancer after having been found to have an elevated PSA at 23.7. He underwent a transrectal US with biopsy with Dr Walter with  on 4/18/2022.       - Pathology came back showing adenocarcinoma in 5 out of 8 cores with: 80%  RB Thais 4+5 (Group 5), 70% RM Rebuck 3+4 (Group 3); 30% RA Rebuck 3 + 4 (Group 2); and 25% LB Thais 3 + 3.   - perineural invasion per path report; Rebuck 4+5  - He had a CT abdomen pelvis on 4/27 which showed sclerotic lesions at L5, left sacrum and left superior pubic ramus with differential of bone island versus metastatic disease, however, the bone scan on 4/27/2022 was negative.   - Stage IIIC (cT2a N0 M0) per Dr Martin's assessment     - reviewed and discussed the pathology and the latest NCCN guidelines (vers. 4.2022)  - he is considered high/very high risk category given the NCCN criteria  - already started on Eligard ADT about 3 weeks ago  - discussed benefit with addition of abiraterone (Zytiga)  - patient wants to be aggressive with his therapy  - will set up with chemotherapy school; discuss the side-effect profile of the drugs and provide literature on the drugs    6/22/2022:  - he is now on the abiraterone and XRT with ADT  - he is on week #4 of the XRT  - slight drop in hgb and plats but no unexpected  - some diarrhea but very  mild    8/17/2022:  - He completed XRT about 2-3 weeks ago and his symptoms improved after about 7-10 days;  - he is continued on zytiga/ADT. He had some recent labs.   - He had eligard injection last Wednesday and continues ADT every 3 months    11/16/2022:  - he is continued on the zytiga and ADT  - some fatigue  - some increase in BP -  I instructed him to monitor his BP at home and if it continues to run high then f/u with Dr Sofia sooner than March 2023 2/16/2023:  - continued on zytiga/pred and ADT  - PSA remains <0.01    5/11/2023:  - doing ok with no new issues  - PSA <0.01  - continued on the zytiga and ADT    8/17/2023:  - continued on ADT and zytiga  - PSa remains <0.01  - mild borderline anemia with normal iron panel, B1q2 and folate          (2) Family Hx/of prostate cancer (dad), breast ca (sister), lymphoma (dad)     (3) Hx/of left rotator cuff/acromioplasty surgery - Spet 2021 with Dr Craven     (4) GERD - prior colonoscopy with Dr Cornejo with GI in Jan 2023     (5) History of positive PPD - recent TB Gold positive - will get CXR - Dr Sofia is aware    VISIT DIAGNOSES:      Prostate cancer          PLAN:  1. continued on zytiga through June 2024; s/p chemotherapy school for the Zytiga; discuss the side-effect profile, provided literature on the drug and obtain consents  2  continue ADT;   3. check labs every 3 months    3. BP Management as per primary - hopefully will normalize once he is off the zytiga in June 2024  4. F/u with PCP, , Rad/onc as directed - sees  in 2 weeks  5. RTC in 3 months     Fax note to Jamie Martin Jr., MD; Kimberlyn Walter Ke[place], [place]       Discussion:     COVID-19 Discussion:    I had long discussion with patient and any applicable family about the COVID-19 coronavirus epidemic and the recommended precautions with regard to cancer and/or hematology patients. I have re-iterated the CDC recommendations for adequate hand washing, use of hand  "-like products, and coughing into elbow, etc. In addition, especially for our patients who are on chemotherapy and/or our otherwise immunocompromised patients, I have recommended avoidance of crowds, including movie theaters, restaurants, churches, etc. I have recommended avoidance of any sick or symptomatic family members and/or friends. I have also recommended avoidance of any raw and unwashed food products, and general avoidance of food items that have not been prepared by themselves. The patient has been asked to call us immediately with any symptom developments, issues, questions or other general concerns.       Pathology Discussion:    I reviewed and discussed the pathology report(s) and radiograph reports (if available) in as simple to understand and/or laymen's terms to the best of my ability. I had an indepth conversation with the patient and went over the patient's individual diagnosis based on the information that was currently available. I discussed the TNM staging process with regard to the patient's particular cancer type, and the calculated stage based on the currently available TNM data and literature. I discussed the available prognostic data with regard to the current staging information and how it relates to the prognosis of their particular neoplastic process.        NCCN Guidelines:    I discussed the available treatment option(s) in accordance with the latest literature from the NCCN Clinical Practice Guidelines for the patient's particular type of cancer disorder. The NCCN Guidelines provide a "document evidence-based (and) consensus-driven management" of the care of oncology patients. The treatment recommendations were made not only in accordance to the NCCN guidelines, but also factored in to account the patient's overall age, condition, performance status and their medical co-morbidities. I went over the risks and benefits of the the treatment options (if any could be made) with " regard to their particular cancer type, their cancer stage, their age, and their co-morbidities.       Chemotherapy Discussion:      I discussed the available treatment option(s) in accordance with the latest/current national evidence-based guidelines (NCCN, UpToDate, NCI, ASCO, etc where applicable), their overall age/condition and their co-morbidities. I also went over the risks and benefits of the chemotherapy with regard to their particular cancer type, their cancer stage, their age/condition, and their co-morbidities. I provided literature on the chemotherapy regimen and discussed the chemotherapy side-effect profiles of the drug(s). I discussed the importance of compliance with obtaining and monitoring weekly lab work, and went over the potential hematopathology issues and risks with anemia, leucopenia and thrombocytopenia that can occur with chemotherapy. I discussed the potential risks of liver and kidney damage, which could be permanent and could necessitate dialysis long-term if kidney failure developed. I discussed the emetic and/or diarrheal potential of the regimen and the potential need for use of antiemetic and anti-diarrheal medications. I discussed the risk for development of anaphylactic shock, bronchospasm, dysrhythmia, and respiratory/cardiovascular arrest and/or failure. I discussed the potential risks for development of alopecia, cold sensory issues, ringing in ears, vertigo, cataracts, glaucoma, and neuropathy, all of which could end up being chronic and life-long. Some chemotherpyI discussed the risks of hand-foot syndrome and rashes, and development of other autoimmune mediated processes such as pneumonitis, hepatitis, and colitis which could be life threatening. I discussed the risks of the potential development of a rare but fatal viral mediated disease known as PML (Progressive Multifocal Leukoencephalopathy), and risk of future development of leukemia and/or lymphoma from use of certain  chemotherapy agents. I discussed the need for neutropenic precautions, basic hygiene/sanitation behaviors and dietary restrictions.    The patient's consent has been obtained to proceed with the chemotherapy.The patient will be referred to Chemotherapy School /[facility] for training and education on chemotherapy, use of antiemetics and/or anti-diarrheals, use of NSAID's, potential chemotherapy side-effects, and any specific recommendations and precautions with the particular chemotherapy agents.      I answered all of the patient's (and family's, if applicable) questions to the best of my ability and to their complete satisfaction. The patient acknowledged full understanding of the risks, recommendations and plan(s).     I spent over 25 mins of time with the patient. Reviewed results of the recently ordered labs, tests and studies; made directives with regards to the results. Over half of this time was spent couseling and coordinating care.    I have explained all of the above in detail and the patient understands all of the current recommendation(s). I have answered all of their questions to the best of my ability and to their complete satisfaction.   The patient is to continue with the current management plan.            Electronically signed by Kip Wright MD

## 2023-11-16 ENCOUNTER — OFFICE VISIT (OUTPATIENT)
Dept: HEMATOLOGY/ONCOLOGY | Facility: CLINIC | Age: 68
End: 2023-11-16
Payer: MEDICARE

## 2023-11-16 VITALS
BODY MASS INDEX: 28.33 KG/M2 | HEART RATE: 57 BPM | RESPIRATION RATE: 16 BRPM | SYSTOLIC BLOOD PRESSURE: 129 MMHG | WEIGHT: 175.5 LBS | DIASTOLIC BLOOD PRESSURE: 75 MMHG | TEMPERATURE: 98 F

## 2023-11-16 DIAGNOSIS — C61 PROSTATE CANCER: Primary | ICD-10-CM

## 2023-11-16 DIAGNOSIS — R79.9 ABNORMAL FINDING OF BLOOD CHEMISTRY, UNSPECIFIED: ICD-10-CM

## 2023-11-16 PROCEDURE — 99214 OFFICE O/P EST MOD 30 MIN: CPT | Mod: S$GLB,,, | Performed by: INTERNAL MEDICINE

## 2023-11-16 PROCEDURE — 99214 PR OFFICE/OUTPT VISIT, EST, LEVL IV, 30-39 MIN: ICD-10-PCS | Mod: S$GLB,,, | Performed by: INTERNAL MEDICINE

## 2023-11-28 ENCOUNTER — LAB VISIT (OUTPATIENT)
Dept: LAB | Facility: HOSPITAL | Age: 68
End: 2023-11-28
Attending: FAMILY MEDICINE
Payer: MEDICARE

## 2023-11-28 DIAGNOSIS — I15.8 OTHER SECONDARY HYPERTENSION: ICD-10-CM

## 2023-11-28 LAB
ANION GAP SERPL CALC-SCNC: 6 MMOL/L (ref 8–16)
BUN SERPL-MCNC: 22 MG/DL (ref 8–23)
CALCIUM SERPL-MCNC: 9.6 MG/DL (ref 8.7–10.5)
CHLORIDE SERPL-SCNC: 103 MMOL/L (ref 95–110)
CO2 SERPL-SCNC: 31 MMOL/L (ref 23–29)
CREAT SERPL-MCNC: 0.9 MG/DL (ref 0.5–1.4)
EST. GFR  (NO RACE VARIABLE): >60 ML/MIN/1.73 M^2
GLUCOSE SERPL-MCNC: 111 MG/DL (ref 70–110)
POTASSIUM SERPL-SCNC: 4 MMOL/L (ref 3.5–5.1)
SODIUM SERPL-SCNC: 140 MMOL/L (ref 136–145)

## 2023-11-28 PROCEDURE — 36415 COLL VENOUS BLD VENIPUNCTURE: CPT | Performed by: FAMILY MEDICINE

## 2023-11-28 PROCEDURE — 80048 BASIC METABOLIC PNL TOTAL CA: CPT | Performed by: FAMILY MEDICINE

## 2023-12-11 DIAGNOSIS — I15.8 OTHER SECONDARY HYPERTENSION: ICD-10-CM

## 2023-12-11 RX ORDER — EPLERENONE 25 MG/1
25 TABLET, FILM COATED ORAL DAILY
Qty: 90 TABLET | Refills: 3 | Status: SHIPPED | OUTPATIENT
Start: 2023-12-11 | End: 2024-12-10

## 2023-12-11 NOTE — TELEPHONE ENCOUNTER
----- Message from Deloris Beltran, Patient Care Assistant sent at 12/11/2023 11:02 AM CST -----  Contact: self  Pt is calling to speak w/ a nurse regarding  eplerenone (INSPRA) 25 MG Tab.  Please call back to advise [phone]  thanks

## 2023-12-11 NOTE — TELEPHONE ENCOUNTER
No care due was identified.  [facility] Embedded Care Due Messages. Reference number: [id number].   12/11/2023 2:19:29 PM CST

## 2024-02-23 ENCOUNTER — LAB VISIT (OUTPATIENT)
Dept: LAB | Facility: HOSPITAL | Age: 69
End: 2024-02-23
Attending: INTERNAL MEDICINE
Payer: MEDICARE

## 2024-02-23 DIAGNOSIS — C61 PROSTATE CANCER: ICD-10-CM

## 2024-02-23 LAB
ALBUMIN SERPL BCP-MCNC: 4.2 G/DL (ref 3.5–5.2)
ALP SERPL-CCNC: 83 U/L (ref 55–135)
ALT SERPL W/O P-5'-P-CCNC: 19 U/L (ref 10–44)
ANION GAP SERPL CALC-SCNC: 4 MMOL/L (ref 8–16)
AST SERPL-CCNC: 20 U/L (ref 10–40)
BASOPHILS # BLD AUTO: 0.03 K/UL (ref 0–0.2)
BASOPHILS NFR BLD: 0.8 % (ref 0–1.9)
BILIRUB SERPL-MCNC: 1 MG/DL (ref 0.1–1)
BUN SERPL-MCNC: 18 MG/DL (ref 8–23)
CALCIUM SERPL-MCNC: 9.7 MG/DL (ref 8.7–10.5)
CHLORIDE SERPL-SCNC: 104 MMOL/L (ref 95–110)
CO2 SERPL-SCNC: 31 MMOL/L (ref 23–29)
COMPLEXED PSA SERPL-MCNC: <0.01 NG/ML (ref 0–4)
CREAT SERPL-MCNC: 0.9 MG/DL (ref 0.5–1.4)
DIFFERENTIAL METHOD BLD: ABNORMAL
EOSINOPHIL # BLD AUTO: 0.2 K/UL (ref 0–0.5)
EOSINOPHIL NFR BLD: 6.2 % (ref 0–8)
ERYTHROCYTE [DISTWIDTH] IN BLOOD BY AUTOMATED COUNT: 13.6 % (ref 11.5–14.5)
EST. GFR  (NO RACE VARIABLE): >60 ML/MIN/1.73 M^2
GLUCOSE SERPL-MCNC: 98 MG/DL (ref 70–110)
HCT VFR BLD AUTO: 40.5 % (ref 40–54)
HGB BLD-MCNC: 13.2 G/DL (ref 14–18)
IMM GRANULOCYTES # BLD AUTO: 0.01 K/UL (ref 0–0.04)
IMM GRANULOCYTES NFR BLD AUTO: 0.3 % (ref 0–0.5)
LYMPHOCYTES # BLD AUTO: 1.2 K/UL (ref 1–4.8)
LYMPHOCYTES NFR BLD: 31.5 % (ref 18–48)
MCH RBC QN AUTO: 30.9 PG (ref 27–31)
MCHC RBC AUTO-ENTMCNC: 32.6 G/DL (ref 32–36)
MCV RBC AUTO: 95 FL (ref 82–98)
MONOCYTES # BLD AUTO: 0.5 K/UL (ref 0.3–1)
MONOCYTES NFR BLD: 13.6 % (ref 4–15)
NEUTROPHILS # BLD AUTO: 1.9 K/UL (ref 1.8–7.7)
NEUTROPHILS NFR BLD: 47.6 % (ref 38–73)
NRBC BLD-RTO: 0 /100 WBC
PLATELET # BLD AUTO: 191 K/UL (ref 150–450)
PMV BLD AUTO: 11.6 FL (ref 9.2–12.9)
POTASSIUM SERPL-SCNC: 3.8 MMOL/L (ref 3.5–5.1)
PROT SERPL-MCNC: 6.9 G/DL (ref 6–8.4)
RBC # BLD AUTO: 4.27 M/UL (ref 4.6–6.2)
SODIUM SERPL-SCNC: 139 MMOL/L (ref 136–145)
WBC # BLD AUTO: 3.9 K/UL (ref 3.9–12.7)

## 2024-02-23 PROCEDURE — 84153 ASSAY OF PSA TOTAL: CPT | Performed by: INTERNAL MEDICINE

## 2024-02-23 PROCEDURE — 85025 COMPLETE CBC W/AUTO DIFF WBC: CPT | Performed by: INTERNAL MEDICINE

## 2024-02-23 PROCEDURE — 80053 COMPREHEN METABOLIC PANEL: CPT | Performed by: INTERNAL MEDICINE

## 2024-02-23 PROCEDURE — 36415 COLL VENOUS BLD VENIPUNCTURE: CPT | Performed by: INTERNAL MEDICINE

## 2024-02-28 NOTE — PROGRESS NOTES
[facility] Hematology/Oncology  PROGRESS NOTE - Follow-up Visit      Subjective:       Patient ID:   NAME: Rusty Sen II : 1955     68 y.o. male    Referring Doc: Jamie Martin Jr., MD  Other Physicians: Matheus Sofia           Chief Complaint: prostate cancer f/u       History of Present Illness:     Patient returns today for a regularly scheduled follow-up visit.  The patient is here today to go over the results of the recently ordered labs, tests and studies. He is here by himself.        He previously had completed XRT and he is continued on zytiga/ADT. He denies any current fatigue. He has been back to the gym 3x/week    His BP is better - followed by Dr Sofia    He continues ADT every 3 months    Breathing ok, No CP, SOB, HA's or N/V;                      ROS:   GEN: normal without any fever, night sweats or weight loss  HEENT: normal with no HA's, sore throat, stiff neck, changes in vision  CV: normal with no CP, SOB, PND, FAIRCHILD or orthopnea  PULM: normal with no SOB, cough, hemoptysis, sputum or pleuritic pain  GI: normal with no abdominal pain, nausea, vomiting, constipation, diarrhea, melanotic stools, BRBPR, or hematemesis  : normal with no hematuria, dysuria  BREAST: normal with no mass, discharge, pain  SKIN: normal with no rash, erythema, bruising, or swelling    Pain Scale:  0  Allergies:  Review of patient's allergies indicates:  No Known Allergies    Medications:    Current Outpatient Medications:     abiraterone (ZYTIGA) 250 mg Tab, Take 4 tablets (1,000 mg total) by mouth once daily., Disp: 120 tablet, Rfl: 11    amLODIPine (NORVASC) 5 MG tablet, Take 1 tablet (5 mg total) by mouth once daily., Disp: 90 tablet, Rfl: 1    eplerenone (INSPRA) 25 MG Tab, Take 1 tablet (25 mg total) by mouth once daily., Disp: 90 tablet, Rfl: 3    predniSONE (DELTASONE) 5 MG tablet, Take 1 tablet (5 mg total) by mouth once daily., Disp: 90 tablet, Rfl: 1    ramipriL (ALTACE) 10 MG capsule, Take  1 capsule (10 mg total) by mouth once daily., Disp: 90 capsule, Rfl: 1    leuprolide, 3 month, (ELIGARD, 3 MONTH,) 22.5 mg Syrg syringe, Inject 22.5 mg into the skin every 3 (three) months., Disp: 1 each, Rfl: 3    PMHx/PSHx Updates:  See patient's last visit with me on  11/16/2023  See H&P on 5/16/2022        Pathology:   Cancer Staging   Prostate cancer  Staging form: Prostate, AJCC 8th Edition  - Clinical: Stage IIIC (cT2a, cN0, cM0, PSA: 23.7, Grade Group: 5) - Signed by Jamie Martin Jr., MD on 5/6/2022      CT abdom/pelvis  4/2/7/2022:     IMPRESSION: Ovoid sclerotic lesions at L5, left-sided sacrum and left superior pubic ramus most likely representing bone island however metastatic disease cannot be excluded and correlation with bone scan is recommended     Levoscoliosis with multilevel degenerative disc disease and facet hypertrophy     Sigmoid diverticulosis without diverticulitis        Bone scan 4/27/2022:     IMPRESSION: Increased activity in joint central distribution as well as the spine compatible with degenerative changes     No evidence of metastatic disease     The abnormality seen on CT most likely represent bone islands     MRI prostate  5/12/2022:      Impression:     1. PIRADS 5.  Large lesion in the right peripheral zone with clinically significant cancer highly likely to be present.  Bulging of the prostate capsule raises the possibility of extracapsular extension.  2. Lesion is in close proximity to the right neurovascular bundle and also extends into the right seminal vesicles.  3. There are prominent but nonenlarged mesorectal lymph nodes which are indeterminate as to the possibility of metastatic disease and warrant continued attention on follow-up imaging.  4. There is also an indeterminate sclerotic bone lesion in the left pubic body.  5. Benign prostatic hypertrophy.  Overall Assessment:     PIRADS 1 (clinically significant cancer is highly unlikely to be present)     PIRADS 2  "(clinically significant cancer is unlikely to be present)     PIRADS 3 (the presence of clinically significant cancer is equivocal)     PIRADS 4 (clinically significant cancer is likely to be present)     PIRADS 5 (clinically significant cancer is highly likely to be present)     Number of targets created for potential MR/US fusion biopsy     Peripheral zone: 1     Transition zone: 0           Objective:     Vitals:  Blood pressure (!) 151/79, pulse (!) 56, temperature 97.1 °F (36.2 °C), resp. rate 17, height 5' 9" (1.753 m), weight 79.5 kg (175 lb 3.2 oz).    Physical Examination:   GEN: no apparent distress, comfortable; AAOx3  HEAD: atraumatic and normocephalic  EYES: no pallor, no icterus, PERRLA  ENT: OMM, no pharyngeal erythema, external ears WNL; no nasal discharge; no thrush  NECK: no masses, thyroid normal, trachea midline, no LAD/LN's, supple  CV: RRR with no murmur; normal pulse; normal S1 and S2; no pedal edema  CHEST: Normal respiratory effort; CTAB; normal breath sounds; no wheeze or crackles  ABDOM: nontender and nondistended; soft; normal bowel sounds; no rebound/guarding  MUSC/Skeletal: ROM normal; no crepitus; joints normal; no deformities or arthropathy  EXTREM: no clubbing, cyanosis, inflammation or swelling  SKIN: no rashes, lesions, ulcers, petechiae or subcutaneous nodules  : no florez  NEURO: grossly intact; motor/sensory WNL; AAOx3; no tremors  PSYCH: normal mood, affect and behavior  LYMPH: normal cervical, supraclavicular, axillary and groin LN's            Labs:     Lab Results   Component Value Date    WBC 3.90 02/23/2024    HGB 13.2 (L) 02/23/2024    HCT 40.5 02/23/2024    MCV 95 02/23/2024     02/23/2024       CMP  Sodium   Date Value Ref Range Status   02/23/2024 139 136 - 145 mmol/L Final     Potassium   Date Value Ref Range Status   02/23/2024 3.8 3.5 - 5.1 mmol/L Final     Chloride   Date Value Ref Range Status   02/23/2024 104 95 - 110 mmol/L Final     CO2   Date Value Ref " Range Status   02/23/2024 31 (H) 23 - 29 mmol/L Final     Glucose   Date Value Ref Range Status   02/23/2024 98 70 - 110 mg/dL Final     BUN   Date Value Ref Range Status   02/23/2024 18 8 - 23 mg/dL Final     Creatinine   Date Value Ref Range Status   02/23/2024 0.9 0.5 - 1.4 mg/dL Final     Calcium   Date Value Ref Range Status   02/23/2024 9.7 8.7 - 10.5 mg/dL Final     Total Protein   Date Value Ref Range Status   02/23/2024 6.9 6.0 - 8.4 g/dL Final     Albumin   Date Value Ref Range Status   02/23/2024 4.2 3.5 - 5.2 g/dL Final     Total Bilirubin   Date Value Ref Range Status   02/23/2024 1.0 0.1 - 1.0 mg/dL Final     Comment:     For infants and newborns, interpretation of results should be based  on gestational age, weight and in agreement with clinical  observations.    Premature Infant recommended reference ranges:  Up to 24 hours.............<8.0 mg/dL  Up to 48 hours............<12.0 mg/dL  3-5 days..................<15.0 mg/dL  6-29 days.................<15.0 mg/dL       Alkaline Phosphatase   Date Value Ref Range Status   02/23/2024 83 55 - 135 U/L Final     AST   Date Value Ref Range Status   02/23/2024 20 10 - 40 U/L Final     ALT   Date Value Ref Range Status   02/23/2024 19 10 - 44 U/L Final     Anion Gap   Date Value Ref Range Status   02/23/2024 4 (L) 8 - 16 mmol/L Final     eGFR if    Date Value Ref Range Status   07/05/2022 >60.0 >60 mL/min/1.73 m^2 Final     eGFR if non    Date Value Ref Range Status   07/05/2022 >60.0 >60 mL/min/1.73 m^2 Final     Comment:     Calculation used to obtain the estimated glomerular filtration  rate (eGFR) is the CKD-EPI equation.        Lab Results   Component Value Date    IRON 121 08/14/2023    TRANSFERRIN 273 08/14/2023    TIBC 382 08/14/2023    FESATURATED 32 08/14/2023      Lab Results   Component Value Date    FERRITIN 145 08/14/2023       11/10/2023  PSA Diagnostic 0.00 - 4.00 ng/mL <0.01          Lab Results   Component  Value Date    BJFVVPXK31 544 08/14/2023     Lab Results   Component Value Date    FOLATE >24.8 (H) 08/14/2023             Radiology/Diagnostic Studies:    CXR 2/16/2023:  IMPRESSION:  1. No acute radiographic abnormalities or detrimental changes compared to September 2021.        I have reviewed all available lab results and radiology reports.    Assessment/Plan:   (1) 68 y.o. male with diagnosis of prostate cancer who has been referred by Jamie Martin Jr., MD for evaluation by medical hematology/oncology. 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 He was recently diagnosed with prostate cancer after having been found to have an elevated PSA at 23.7. He underwent a transrectal US with biopsy with Dr Walter with  on 4/18/2022.       - Pathology came back showing adenocarcinoma in 5 out of 8 cores with: 80%  RB [place] 4+5 (Group 5), 70% RM [place] 3+4 (Group 3); 30% RA [place] 3 + 4 (Group 2); and 25% LB Thais 3 + 3.   - perineural invasion per path report; [place] 4+5  - He had a CT abdomen pelvis on 4/27 which showed sclerotic lesions at L5, left sacrum and left superior pubic ramus with differential of bone island versus metastatic disease, however, the bone scan on 4/27/2022 was negative.   - Stage IIIC (cT2a N0 M0) per Dr Martin's assessment     - reviewed and discussed the pathology and the latest NCCN guidelines (vers. 4.2022)  - he is considered high/very high risk category given the NCCN criteria  - already started on Eligard ADT about 3 weeks ago  - discussed benefit with addition of abiraterone (Zytiga)  - patient wants to be aggressive with his therapy  - will set up with chemotherapy school; discuss the side-effect profile of the drugs and provide literature on the drugs    6/22/2022:  - he is now on the abiraterone and XRT with ADT  - he is on week #4 of the XRT  - slight drop in hgb and plats but no unexpected  - some diarrhea but very mild    8/17/2022:  - He completed XRT about 2-3 weeks  ago and his symptoms improved after about 7-10 days;  - he is continued on zytiga/ADT. He had some recent labs.   - He had eligard injection last Wednesday and continues ADT every 3 months    11/16/2022:  - he is continued on the zytiga and ADT  - some fatigue  - some increase in BP -  I instructed him to monitor his BP at home and if it continues to run high then f/u with Dr Sofia sooner than March 2023 2/16/2023:  - continued on zytiga/pred and ADT  - PSA remains <0.01    5/11/2023:  - doing ok with no new issues  - PSA <0.01  - continued on the zytiga and ADT    8/17/2023:  - continued on ADT and zytiga  - PSa remains <0.01  - mild borderline anemia with normal iron panel, B1q2 and folate    2/29/2024:  - due to discontinue the Zytiga in May 2024  - PSA remains <0.01          (2) Family Hx/of prostate cancer (dad), breast ca (sister), lymphoma (dad)     (3) Hx/of left rotator cuff/acromioplasty surgery - Spet 2021 with Dr Craven     (4) GERD - prior colonoscopy with Dr Cornejo with GI in Jan 2023     (5) History of positive PPD - prior TB Gold positive   - Dr Sofia is aware  - check up to date CXR    VISIT DIAGNOSES:      Prostate cancer          PLAN:  1. continued on zytiga through May 2024; s/p chemotherapy school for the Zytiga; discuss the side-effect profile, provided literature on the drug and obtain consents  2  continue ADT as directed - in couple weeks (last one)   3. check labs every 3 months    3. BP Management as per primary - hopefully will normalize once he is off the zytiga in May 2024  4. F/u with PCP, , Rad/onc as directed - sees  in 2-3 weeks  5. Check repeat CXR due the prior positive TB test   RTC in 3 months     Fax note to Jamie Martin Jr., MD; Kimberlyn Walter [place], [place]       Discussion:     COVID-19 Discussion:    I had long discussion with patient and any applicable family about the COVID-19 coronavirus epidemic and the recommended precautions with  "regard to cancer and/or hematology patients. I have re-iterated the CDC recommendations for adequate hand washing, use of hand -like products, and coughing into elbow, etc. In addition, especially for our patients who are on chemotherapy and/or our otherwise immunocompromised patients, I have recommended avoidance of crowds, including movie theaters, restaurants, churches, etc. I have recommended avoidance of any sick or symptomatic family members and/or friends. I have also recommended avoidance of any raw and unwashed food products, and general avoidance of food items that have not been prepared by themselves. The patient has been asked to call us immediately with any symptom developments, issues, questions or other general concerns.       Pathology Discussion:    I reviewed and discussed the pathology report(s) and radiograph reports (if available) in as simple to understand and/or laymen's terms to the best of my ability. I had an indepth conversation with the patient and went over the patient's individual diagnosis based on the information that was currently available. I discussed the TNM staging process with regard to the patient's particular cancer type, and the calculated stage based on the currently available TNM data and literature. I discussed the available prognostic data with regard to the current staging information and how it relates to the prognosis of their particular neoplastic process.        NCCN Guidelines:    I discussed the available treatment option(s) in accordance with the latest literature from the NCCN Clinical Practice Guidelines for the patient's particular type of cancer disorder. The NCCN Guidelines provide a "document evidence-based (and) consensus-driven management" of the care of oncology patients. The treatment recommendations were made not only in accordance to the NCCN guidelines, but also factored in to account the patient's overall age, condition, performance status " and their medical co-morbidities. I went over the risks and benefits of the the treatment options (if any could be made) with regard to their particular cancer type, their cancer stage, their age, and their co-morbidities.       Chemotherapy Discussion:      I discussed the available treatment option(s) in accordance with the latest/current national evidence-based guidelines (NCCN, UpToDate, NCI, ASCO, etc where applicable), their overall age/condition and their co-morbidities. I also went over the risks and benefits of the chemotherapy with regard to their particular cancer type, their cancer stage, their age/condition, and their co-morbidities. I provided literature on the chemotherapy regimen and discussed the chemotherapy side-effect profiles of the drug(s). I discussed the importance of compliance with obtaining and monitoring weekly lab work, and went over the potential hematopathology issues and risks with anemia, leucopenia and thrombocytopenia that can occur with chemotherapy. I discussed the potential risks of liver and kidney damage, which could be permanent and could necessitate dialysis long-term if kidney failure developed. I discussed the emetic and/or diarrheal potential of the regimen and the potential need for use of antiemetic and anti-diarrheal medications. I discussed the risk for development of anaphylactic shock, bronchospasm, dysrhythmia, and respiratory/cardiovascular arrest and/or failure. I discussed the potential risks for development of alopecia, cold sensory issues, ringing in ears, vertigo, cataracts, glaucoma, and neuropathy, all of which could end up being chronic and life-long. Some chemotherpyI discussed the risks of hand-foot syndrome and rashes, and development of other autoimmune mediated processes such as pneumonitis, hepatitis, and colitis which could be life threatening. I discussed the risks of the potential development of a rare but fatal viral mediated disease known as PML  (Progressive Multifocal Leukoencephalopathy), and risk of future development of leukemia and/or lymphoma from use of certain chemotherapy agents. I discussed the need for neutropenic precautions, basic hygiene/sanitation behaviors and dietary restrictions.    The patient's consent has been obtained to proceed with the chemotherapy.The patient will be referred to Chemotherapy School [facility] for training and education on chemotherapy, use of antiemetics and/or anti-diarrheals, use of NSAID's, potential chemotherapy side-effects, and any specific recommendations and precautions with the particular chemotherapy agents.      I answered all of the patient's (and family's, if applicable) questions to the best of my ability and to their complete satisfaction. The patient acknowledged full understanding of the risks, recommendations and plan(s).     I spent over 25 mins of time with the patient. Reviewed results of the recently ordered labs, tests and studies; made directives with regards to the results. Over half of this time was spent couseling and coordinating care.    I have explained all of the above in detail and the patient understands all of the current recommendation(s). I have answered all of their questions to the best of my ability and to their complete satisfaction.   The patient is to continue with the current management plan.            Electronically signed by Kip Wright MD

## 2024-02-29 ENCOUNTER — OFFICE VISIT (OUTPATIENT)
Dept: HEMATOLOGY/ONCOLOGY | Facility: CLINIC | Age: 69
End: 2024-02-29
Payer: MEDICARE

## 2024-02-29 VITALS
SYSTOLIC BLOOD PRESSURE: 151 MMHG | WEIGHT: 175.19 LBS | BODY MASS INDEX: 25.95 KG/M2 | DIASTOLIC BLOOD PRESSURE: 79 MMHG | HEART RATE: 56 BPM | RESPIRATION RATE: 17 BRPM | TEMPERATURE: 97 F | HEIGHT: 69 IN

## 2024-02-29 DIAGNOSIS — C61 PROSTATE CANCER: Primary | ICD-10-CM

## 2024-02-29 DIAGNOSIS — C61 PROSTATE CANCER: ICD-10-CM

## 2024-02-29 DIAGNOSIS — R76.12 POSITIVE QUANTIFERON-TB GOLD TEST: ICD-10-CM

## 2024-02-29 PROCEDURE — 99214 OFFICE O/P EST MOD 30 MIN: CPT | Mod: S$GLB,,, | Performed by: INTERNAL MEDICINE

## 2024-02-29 RX ORDER — PREDNISONE 5 MG/1
5 TABLET ORAL
Qty: 90 TABLET | Refills: 0 | Status: SHIPPED | OUTPATIENT
Start: 2024-02-29

## 2024-03-01 ENCOUNTER — HOSPITAL ENCOUNTER (OUTPATIENT)
Dept: RADIOLOGY | Facility: HOSPITAL | Age: 69
Discharge: HOME | End: 2024-03-01
Attending: INTERNAL MEDICINE
Payer: MEDICARE

## 2024-03-01 DIAGNOSIS — C61 PROSTATE CANCER: ICD-10-CM

## 2024-03-01 DIAGNOSIS — R76.12 POSITIVE QUANTIFERON-TB GOLD TEST: ICD-10-CM

## 2024-03-01 PROCEDURE — 71046 X-RAY EXAM CHEST 2 VIEWS: CPT | Mod: TC,PO

## 2024-04-24 ENCOUNTER — TELEPHONE (OUTPATIENT)
Dept: FAMILY MEDICINE | Facility: CLINIC | Age: 69
End: 2024-04-24
Payer: MEDICARE

## 2024-04-24 NOTE — TELEPHONE ENCOUNTER
Spoke with patient and advised that he does need to keep appointment in order to get his medication refilled.  Patient verbalized understanding.

## 2024-04-24 NOTE — TELEPHONE ENCOUNTER
----- Message from Kerri Angel sent at 4/24/2024 10:09 AM CDT -----  Contact: pt [phone]  Type: Needs Medical Advice  Who Called:  Pt     Best Call Back Number: [phone]    Additional Information: Pt asking if his annual appt in May is necessary. Pt stated he see Dr Duong every 3 months. Pls call back and advise

## 2024-05-06 ENCOUNTER — OFFICE VISIT (OUTPATIENT)
Dept: FAMILY MEDICINE | Facility: CLINIC | Age: 69
End: 2024-05-06
Payer: MEDICARE

## 2024-05-06 VITALS
TEMPERATURE: 98 F | HEART RATE: 56 BPM | BODY MASS INDEX: 25.55 KG/M2 | HEIGHT: 69 IN | OXYGEN SATURATION: 96 % | DIASTOLIC BLOOD PRESSURE: 60 MMHG | SYSTOLIC BLOOD PRESSURE: 110 MMHG | WEIGHT: 172.5 LBS

## 2024-05-06 DIAGNOSIS — I15.8 OTHER SECONDARY HYPERTENSION: Primary | ICD-10-CM

## 2024-05-06 DIAGNOSIS — C61 PROSTATE CANCER: ICD-10-CM

## 2024-05-06 DIAGNOSIS — R73.09 ELEVATED HEMOGLOBIN A1C: ICD-10-CM

## 2024-05-06 PROCEDURE — 99213 OFFICE O/P EST LOW 20 MIN: CPT | Mod: PBBFAC,PN | Performed by: FAMILY MEDICINE

## 2024-05-06 PROCEDURE — 99999 PR PBB SHADOW E&M-EST. PATIENT-LVL III: CPT | Mod: PBBFAC,,, | Performed by: FAMILY MEDICINE

## 2024-05-06 PROCEDURE — 99214 OFFICE O/P EST MOD 30 MIN: CPT | Mod: S$PBB,,, | Performed by: FAMILY MEDICINE

## 2024-05-06 NOTE — PROGRESS NOTES
Subjective     Patient ID: Rusty Sen II is a 69 y.o. male.    Chief Complaint: Hypertension and Prostate Cancer    Patient presents here for annual follow-up of hypertension and prostate cancer.  His weight is stable at this time and his BMI today is 25.48.  He is exercising 6 days a week and watching his diet fairly well.  His hypertension has been well controlled on his present medications.  It should be noted that he developed hypertension after going on chemotherapy for his prostate cancer.  He is presently on 3 drug therapy.  He is scheduled to complete his 2 year course of abiraterone next month.  This is the medication that cause his blood pressure to elevate so once he finishes, he needs to monitor his blood pressure to see if this comes down and see if we can decrease or eliminate some of his antihypertensive medication.  He has been feeling well otherwise and has no new complaints.  As far as health maintenance, he is up-to-date with all of his recommended screening exams and immunizations with the exception of pneumococcal vaccine, tetanus vaccine, shingles vaccine, RSV vaccine, and 2nd COVID booster.  He states he did have the Shingrix in the past and has documentation at home which he will bring in.  He also thinks he had some combination of pneumococcal vaccines.      Review of Systems   Constitutional:  Negative for chills, fatigue, fever and unexpected weight change.   HENT:  Positive for hearing loss (He does have bilateral hearing aids now). Negative for nasal congestion, postnasal drip and sore throat.    Respiratory:  Negative for cough and shortness of breath.    Cardiovascular:  Negative for chest pain, palpitations and leg swelling.   Gastrointestinal:  Negative for abdominal pain, diarrhea, nausea and vomiting.   Genitourinary:  Negative for difficulty urinating and dysuria.   Musculoskeletal:  Negative for arthralgias and back pain.   Neurological:  Negative for dizziness,  light-headedness and headaches.   Psychiatric/Behavioral:  Negative for sleep disturbance. The patient is not nervous/anxious.           Objective     Physical Exam  Vitals reviewed.   Constitutional:       General: He is not in acute distress.     Appearance: Normal appearance. He is well-developed and normal weight.   HENT:      Right Ear: Tympanic membrane and external ear normal.      Left Ear: Tympanic membrane and external ear normal.      Mouth/Throat:      Pharynx: Oropharynx is clear. No posterior oropharyngeal erythema.   Neck:      Thyroid: No thyromegaly.      Vascular: No carotid bruit.   Cardiovascular:      Rate and Rhythm: Normal rate and regular rhythm.      Pulses: Normal pulses.      Heart sounds: Normal heart sounds. No murmur heard.  Pulmonary:      Effort: Pulmonary effort is normal.      Breath sounds: Normal breath sounds. No wheezing or rales.   Abdominal:      General: Bowel sounds are normal.      Palpations: Abdomen is soft.      Tenderness: There is no abdominal tenderness. There is no guarding or rebound.   Musculoskeletal:         General: Normal range of motion.      Cervical back: Normal range of motion and neck supple.      Right lower leg: No edema.      Left lower leg: No edema.   Lymphadenopathy:      Cervical: No cervical adenopathy.   Neurological:      General: No focal deficit present.      Mental Status: He is alert and oriented to person, place, and time.      Cranial Nerves: No cranial nerve deficit.      Deep Tendon Reflexes: Reflexes are normal and symmetric.            Assessment and Plan     1. Other secondary hypertension    2. Prostate cancer    3. Elevated hemoglobin A1c        1. Continue present medication as his hypertension and prostate cancer stable   2. Continue low-sodium diet as well as regular exercise.  BMI today is 25.48  3. Continue follow-up with Oncology and Urology as scheduled  4. Monitor blood pressure closely after stopping abiraterone and let me  know if we need to decrease medications  5. Follow up in 1 year          Follow up in about 1 year (around 5/6/2025).

## 2024-05-12 DIAGNOSIS — I15.8 OTHER SECONDARY HYPERTENSION: ICD-10-CM

## 2024-05-12 NOTE — TELEPHONE ENCOUNTER
No care due was identified.  [facility] Embedded Care Due Messages. Reference number: [id number].   5/12/2024 6:41:49 AM CDT

## 2024-05-13 RX ORDER — RAMIPRIL 10 MG/1
10 CAPSULE ORAL DAILY
Qty: 90 CAPSULE | Refills: 3 | Status: SHIPPED | OUTPATIENT
Start: 2024-05-13 | End: 2025-05-13

## 2024-05-13 RX ORDER — RAMIPRIL 10 MG/1
10 CAPSULE ORAL
Qty: 90 CAPSULE | Refills: 0 | OUTPATIENT
Start: 2024-05-13

## 2024-05-13 NOTE — TELEPHONE ENCOUNTER
Refill Decision Note   Rusty Sen  is requesting a refill authorization.  Brief Assessment and Rationale for Refill:  Quick Discontinue     Medication Therapy Plan:  Duplicate      Comments:     Note composed:2:00 AM 05/13/2024

## 2024-05-13 NOTE — TELEPHONE ENCOUNTER
No care due was identified.  [facility] Embedded Care Due Messages. Reference number: [id number].   5/12/2024 7:01:58 PM CDT

## 2024-05-21 ENCOUNTER — TELEPHONE (OUTPATIENT)
Facility: CLINIC | Age: 69
End: 2024-05-21
Payer: MEDICARE

## 2024-05-22 ENCOUNTER — TELEPHONE (OUTPATIENT)
Facility: CLINIC | Age: 69
End: 2024-05-22
Payer: MEDICARE

## 2024-05-22 ENCOUNTER — LAB VISIT (OUTPATIENT)
Dept: LAB | Facility: HOSPITAL | Age: 69
End: 2024-05-22
Attending: INTERNAL MEDICINE
Payer: MEDICARE

## 2024-05-22 DIAGNOSIS — R79.9 ABNORMAL FINDING OF BLOOD CHEMISTRY, UNSPECIFIED: ICD-10-CM

## 2024-05-22 DIAGNOSIS — D64.9 ANEMIA, UNSPECIFIED TYPE: ICD-10-CM

## 2024-05-22 DIAGNOSIS — C61 PROSTATE CANCER: ICD-10-CM

## 2024-05-22 LAB
ALBUMIN SERPL BCP-MCNC: 4.2 G/DL (ref 3.5–5.2)
ALP SERPL-CCNC: 67 U/L (ref 55–135)
ALT SERPL W/O P-5'-P-CCNC: 18 U/L (ref 10–44)
ANION GAP SERPL CALC-SCNC: 4 MMOL/L (ref 8–16)
AST SERPL-CCNC: 20 U/L (ref 10–40)
BASOPHILS # BLD AUTO: 0.02 K/UL (ref 0–0.2)
BASOPHILS NFR BLD: 0.3 % (ref 0–1.9)
BILIRUB SERPL-MCNC: 1 MG/DL (ref 0.1–1)
BUN SERPL-MCNC: 21 MG/DL (ref 8–23)
CALCIUM SERPL-MCNC: 9.6 MG/DL (ref 8.7–10.5)
CHLORIDE SERPL-SCNC: 105 MMOL/L (ref 95–110)
CO2 SERPL-SCNC: 31 MMOL/L (ref 23–29)
COMPLEXED PSA SERPL-MCNC: <0.01 NG/ML (ref 0–4)
CREAT SERPL-MCNC: 0.9 MG/DL (ref 0.5–1.4)
DIFFERENTIAL METHOD BLD: ABNORMAL
EOSINOPHIL # BLD AUTO: 0.1 K/UL (ref 0–0.5)
EOSINOPHIL NFR BLD: 1.5 % (ref 0–8)
ERYTHROCYTE [DISTWIDTH] IN BLOOD BY AUTOMATED COUNT: 13.5 % (ref 11.5–14.5)
EST. GFR  (NO RACE VARIABLE): >60 ML/MIN/1.73 M^2
ESTIMATED AVG GLUCOSE: 128 MG/DL (ref 68–131)
GLUCOSE SERPL-MCNC: 121 MG/DL (ref 70–110)
HBA1C MFR BLD: 6.1 % (ref 4.5–6.2)
HCT VFR BLD AUTO: 39.3 % (ref 40–54)
HGB BLD-MCNC: 13.2 G/DL (ref 14–18)
IMM GRANULOCYTES # BLD AUTO: 0.01 K/UL (ref 0–0.04)
IMM GRANULOCYTES NFR BLD AUTO: 0.2 % (ref 0–0.5)
LYMPHOCYTES # BLD AUTO: 0.7 K/UL (ref 1–4.8)
LYMPHOCYTES NFR BLD: 11.2 % (ref 18–48)
MCH RBC QN AUTO: 32.1 PG (ref 27–31)
MCHC RBC AUTO-ENTMCNC: 33.6 G/DL (ref 32–36)
MCV RBC AUTO: 96 FL (ref 82–98)
MONOCYTES # BLD AUTO: 0.5 K/UL (ref 0.3–1)
MONOCYTES NFR BLD: 7.3 % (ref 4–15)
NEUTROPHILS # BLD AUTO: 5.2 K/UL (ref 1.8–7.7)
NEUTROPHILS NFR BLD: 79.5 % (ref 38–73)
NRBC BLD-RTO: 0 /100 WBC
PLATELET # BLD AUTO: 176 K/UL (ref 150–450)
PMV BLD AUTO: 11.2 FL (ref 9.2–12.9)
POTASSIUM SERPL-SCNC: 4.3 MMOL/L (ref 3.5–5.1)
PROT SERPL-MCNC: 6.6 G/DL (ref 6–8.4)
RBC # BLD AUTO: 4.11 M/UL (ref 4.6–6.2)
SODIUM SERPL-SCNC: 140 MMOL/L (ref 136–145)
WBC # BLD AUTO: 6.58 K/UL (ref 3.9–12.7)

## 2024-05-22 PROCEDURE — 85025 COMPLETE CBC W/AUTO DIFF WBC: CPT | Performed by: INTERNAL MEDICINE

## 2024-05-22 PROCEDURE — 84153 ASSAY OF PSA TOTAL: CPT | Performed by: INTERNAL MEDICINE

## 2024-05-22 PROCEDURE — 36415 COLL VENOUS BLD VENIPUNCTURE: CPT | Performed by: INTERNAL MEDICINE

## 2024-05-22 PROCEDURE — 80053 COMPREHEN METABOLIC PANEL: CPT | Performed by: INTERNAL MEDICINE

## 2024-05-22 PROCEDURE — 83036 HEMOGLOBIN GLYCOSYLATED A1C: CPT | Performed by: INTERNAL MEDICINE

## 2024-05-22 NOTE — TELEPHONE ENCOUNTER
----- Message from Kip Wright MD sent at 5/22/2024 12:13 PM CDT -----  Please let him know that his PSA remains undetectable

## 2024-05-27 NOTE — PROGRESS NOTES
[facility] Hematology/Oncology  PROGRESS NOTE - Follow-up Visit      Subjective:       Patient ID:   NAME: Rusty Sen II : 1955     69 y.o. male    Referring Doc: Jamie Martin Jr., MD  Other Physicians: Merissa Sofia; Jose Angel           Chief Complaint: prostate cancer f/u       History of Present Illness:     Patient returns today for a regularly scheduled follow-up visit.  The patient is here today to go over the results of the recently ordered labs, tests and studies. He is here by himself.        He previously had completed XRT and he is continued on zytiga/ADT until this past week when he completed the two year course.      He has been back to the gym 3x/week    His BP is better - followed by Dr Sofia and saw him on 2024    He sees Dr Man again on Oct 2024      Breathing ok, No CP, SOB, HA's or N/V; He denies any current fatigue.                     ROS:   GEN: normal without any fever, night sweats or weight loss  HEENT: normal with no HA's, sore throat, stiff neck, changes in vision  CV: normal with no CP, SOB, PND, FAIRCHILD or orthopnea  PULM: normal with no SOB, cough, hemoptysis, sputum or pleuritic pain  GI: normal with no abdominal pain, nausea, vomiting, constipation, diarrhea, melanotic stools, BRBPR, or hematemesis  : normal with no hematuria, dysuria  BREAST: normal with no mass, discharge, pain  SKIN: normal with no rash, erythema, bruising, or swelling    Pain Scale:  0  Allergies:  Review of patient's allergies indicates:  No Known Allergies    Medications:    Current Outpatient Medications:     eplerenone (INSPRA) 25 MG Tab, Take 1 tablet (25 mg total) by mouth once daily., Disp: 90 tablet, Rfl: 3    predniSONE (DELTASONE) 5 MG tablet, Take 1 tablet by mouth once daily, Disp: 90 tablet, Rfl: 0    ramipriL (ALTACE) 10 MG capsule, Take 1 capsule (10 mg total) by mouth once daily., Disp: 90 capsule, Rfl: 3    abiraterone (ZYTIGA) 250 mg Tab, Take 4 tablets (1,000 mg total)  by mouth once daily. (Patient not taking: Reported on 5/29/2024.), Disp: 120 tablet, Rfl: 11    amLODIPine (NORVASC) 5 MG tablet, Take 1 tablet (5 mg total) by mouth once daily., Disp: 90 tablet, Rfl: 1    PMHx/PSHx Updates:  See patient's last visit with me on  2/29/2024  See H&P on 5/16/2022        Pathology:   Cancer Staging   Prostate cancer  Staging form: Prostate, AJCC 8th Edition  - Clinical: Stage IIIC (cT2a, cN0, cM0, PSA: 23.7, Grade Group: 5) - Signed by Jamie Martin Jr., MD on 5/6/2022      CT abdom/pelvis  4/2/7/2022:     IMPRESSION: Ovoid sclerotic lesions at L5, left-sided sacrum and left superior pubic ramus most likely representing bone island however metastatic disease cannot be excluded and correlation with bone scan is recommended     Levoscoliosis with multilevel degenerative disc disease and facet hypertrophy     Sigmoid diverticulosis without diverticulitis        Bone scan 4/27/2022:     IMPRESSION: Increased activity in joint central distribution as well as the spine compatible with degenerative changes     No evidence of metastatic disease     The abnormality seen on CT most likely represent bone islands     MRI prostate  5/12/2022:      Impression:     1. PIRADS 5.  Large lesion in the right peripheral zone with clinically significant cancer highly likely to be present.  Bulging of the prostate capsule raises the possibility of extracapsular extension.  2. Lesion is in close proximity to the right neurovascular bundle and also extends into the right seminal vesicles.  3. There are prominent but nonenlarged mesorectal lymph nodes which are indeterminate as to the possibility of metastatic disease and warrant continued attention on follow-up imaging.  4. There is also an indeterminate sclerotic bone lesion in the left pubic body.  5. Benign prostatic hypertrophy.  Overall Assessment:     PIRADS 1 (clinically significant cancer is highly unlikely to be present)     PIRADS 2 (clinically  "significant cancer is unlikely to be present)     PIRADS 3 (the presence of clinically significant cancer is equivocal)     PIRADS 4 (clinically significant cancer is likely to be present)     PIRADS 5 (clinically significant cancer is highly likely to be present)     Number of targets created for potential MR/US fusion biopsy     Peripheral zone: 1     Transition zone: 0           Objective:     Vitals:  Blood pressure 125/76, pulse (!) 56, temperature 97.1 °F (36.2 °C), resp. rate 16, height 5' 9" (1.753 m), weight 77.3 kg (170 lb 6.4 oz).    Physical Examination:   GEN: no apparent distress, comfortable; AAOx3  HEAD: atraumatic and normocephalic  EYES: no pallor, no icterus, PERRLA  ENT: OMM, no pharyngeal erythema, external ears WNL; no nasal discharge; no thrush  NECK: no masses, thyroid normal, trachea midline, no LAD/LN's, supple  CV: RRR with no murmur; normal pulse; normal S1 and S2; no pedal edema  CHEST: Normal respiratory effort; CTAB; normal breath sounds; no wheeze or crackles  ABDOM: nontender and nondistended; soft; normal bowel sounds; no rebound/guarding  MUSC/Skeletal: ROM normal; no crepitus; joints normal; no deformities or arthropathy  EXTREM: no clubbing, cyanosis, inflammation or swelling  SKIN: no rashes, lesions, ulcers, petechiae or subcutaneous nodules  : no florez  NEURO: grossly intact; motor/sensory WNL; AAOx3; no tremors  PSYCH: normal mood, affect and behavior  LYMPH: normal cervical, supraclavicular, axillary and groin LN's            Labs:     Lab Results   Component Value Date    WBC 6.58 05/22/2024    HGB 13.2 (L) 05/22/2024    HCT 39.3 (L) 05/22/2024    MCV 96 05/22/2024     05/22/2024       CMP  Sodium   Date Value Ref Range Status   05/22/2024 140 136 - 145 mmol/L Final     Potassium   Date Value Ref Range Status   05/22/2024 4.3 3.5 - 5.1 mmol/L Final     Chloride   Date Value Ref Range Status   05/22/2024 105 95 - 110 mmol/L Final     CO2   Date Value Ref Range " Status   05/22/2024 31 (H) 23 - 29 mmol/L Final     Glucose   Date Value Ref Range Status   05/22/2024 121 (H) 70 - 110 mg/dL Final     BUN   Date Value Ref Range Status   05/22/2024 21 8 - 23 mg/dL Final     Creatinine   Date Value Ref Range Status   05/22/2024 0.9 0.5 - 1.4 mg/dL Final     Calcium   Date Value Ref Range Status   05/22/2024 9.6 8.7 - 10.5 mg/dL Final     Total Protein   Date Value Ref Range Status   05/22/2024 6.6 6.0 - 8.4 g/dL Final     Albumin   Date Value Ref Range Status   05/22/2024 4.2 3.5 - 5.2 g/dL Final     Total Bilirubin   Date Value Ref Range Status   05/22/2024 1.0 0.1 - 1.0 mg/dL Final     Comment:     For infants and newborns, interpretation of results should be based  on gestational age, weight and in agreement with clinical  observations.    Premature Infant recommended reference ranges:  Up to 24 hours.............<8.0 mg/dL  Up to 48 hours............<12.0 mg/dL  3-5 days..................<15.0 mg/dL  6-29 days.................<15.0 mg/dL       Alkaline Phosphatase   Date Value Ref Range Status   05/22/2024 67 55 - 135 U/L Final     AST   Date Value Ref Range Status   05/22/2024 20 10 - 40 U/L Final     ALT   Date Value Ref Range Status   05/22/2024 18 10 - 44 U/L Final     Anion Gap   Date Value Ref Range Status   05/22/2024 4 (L) 8 - 16 mmol/L Final     eGFR if    Date Value Ref Range Status   07/05/2022 >60.0 >60 mL/min/1.73 m^2 Final     eGFR if non    Date Value Ref Range Status   07/05/2022 >60.0 >60 mL/min/1.73 m^2 Final     Comment:     Calculation used to obtain the estimated glomerular filtration  rate (eGFR) is the CKD-EPI equation.        Lab Results   Component Value Date    IRON 121 08/14/2023    TRANSFERRIN 273 08/14/2023    TIBC 382 08/14/2023    FESATURATED 32 08/14/2023      Lab Results   Component Value Date    FERRITIN 145 08/14/2023             Lab Results   Component Value Date    LMBPXBPS94 544 08/14/2023     Lab Results    Component Value Date    FOLATE >24.8 (H) 08/14/2023       PSA Diagnostic 0.00 - 4.00 ng/mL <0.01         Radiology/Diagnostic Studies:    CXR  3/1/2024:      IMPRESSION: No evidence of active cardiopulmonary disease.       CXR 2/16/2023:  IMPRESSION:  1. No acute radiographic abnormalities or detrimental changes compared to September 2021.        I have reviewed all available lab results and radiology reports.    Assessment/Plan:   (1) 69 y.o. male with diagnosis of prostate cancer who has been referred by Jamie Martin Jr., MD for evaluation by medical hematology/oncology. 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 He was recently diagnosed with prostate cancer after having been found to have an elevated PSA at 23.7. He underwent a transrectal US with biopsy with Dr Walter with  on 4/18/2022.       - Pathology came back showing adenocarcinoma in 5 out of 8 cores with: 80%  RB [place] 4+5 (Group 5), 70% RM [place] 3+4 (Group 3); 30% RA [place] 3 + 4 (Group 2); and 25% LB [place] 3 + 3.   - perineural invasion per path report; Thais 4+5  - He had a CT abdomen pelvis on 4/27 which showed sclerotic lesions at L5, left sacrum and left superior pubic ramus with differential of bone island versus metastatic disease, however, the bone scan on 4/27/2022 was negative.   - Stage IIIC (cT2a N0 M0) per Dr Martin's assessment     - reviewed and discussed the pathology and the latest NCCN guidelines (vers. 4.2022)  - he is considered high/very high risk category given the NCCN criteria  - already started on Eligard ADT about 3 weeks ago  - discussed benefit with addition of abiraterone (Zytiga)  - patient wants to be aggressive with his therapy  - will set up with chemotherapy school; discuss the side-effect profile of the drugs and provide literature on the drugs    6/22/2022:  - he is now on the abiraterone and XRT with ADT  - he is on week #4 of the XRT  - slight drop in hgb and plats but no unexpected  - some  diarrhea but very mild    8/17/2022:  - He completed XRT about 2-3 weeks ago and his symptoms improved after about 7-10 days;  - he is continued on zytiga/ADT. He had some recent labs.   - He had eligard injection last Wednesday and continues ADT every 3 months    11/16/2022:  - he is continued on the zytiga and ADT  - some fatigue  - some increase in BP -  I instructed him to monitor his BP at home and if it continues to run high then f/u with Dr Sofia sooner than March 2023 2/16/2023:  - continued on zytiga/pred and ADT  - PSA remains <0.01    5/11/2023:  - doing ok with no new issues  - PSA <0.01  - continued on the zytiga and ADT    8/17/2023:  - continued on ADT and zytiga  - PSa remains <0.01  - mild borderline anemia with normal iron panel, B1q2 and folate    2/29/2024:  - due to discontinue the Zytiga in May 2024  - PSA remains <0.01    5/29/20214:  - completed two year course of Zytiga last week  - PSA <0.01          (2) Family Hx/of prostate cancer (dad), breast ca (sister), lymphoma (dad)     (3) Hx/of left rotator cuff/acromioplasty surgery - Spet 2021 with Dr Craven     (4) GERD - prior colonoscopy with Dr Cornejo with GI in Jan 2023     (5) History of positive PPD - prior TB Gold positive   - Dr Sofia is aware       VISIT DIAGNOSES:      Prostate cancer          PLAN:  1. discontinued zytiga last week; s/p chemotherapy school for the Zytiga; discuss the side-effect profile, provided literature on the drug and obtain consents  2  f/u with Dr Walter in Oct 2024  3. check labs every 3 months    3. BP Management as per primary - hopefully will normalize once he is off the zytiga in May 2024  4. F/u with PCP, , Rad/onc as directed      RTC in 3-4 months     Fax note to Jamie Martin Jr., MD; Kimberlyn Walter [place], [place]       Discussion:     COVID-19 Discussion:    I had long discussion with patient and any applicable family about the COVID-19 coronavirus epidemic and the  "recommended precautions with regard to cancer and/or hematology patients. I have re-iterated the CDC recommendations for adequate hand washing, use of hand -like products, and coughing into elbow, etc. In addition, especially for our patients who are on chemotherapy and/or our otherwise immunocompromised patients, I have recommended avoidance of crowds, including movie theaters, restaurants, churches, etc. I have recommended avoidance of any sick or symptomatic family members and/or friends. I have also recommended avoidance of any raw and unwashed food products, and general avoidance of food items that have not been prepared by themselves. The patient has been asked to call us immediately with any symptom developments, issues, questions or other general concerns.       Pathology Discussion:    I reviewed and discussed the pathology report(s) and radiograph reports (if available) in as simple to understand and/or laymen's terms to the best of my ability. I had an indepth conversation with the patient and went over the patient's individual diagnosis based on the information that was currently available. I discussed the TNM staging process with regard to the patient's particular cancer type, and the calculated stage based on the currently available TNM data and literature. I discussed the available prognostic data with regard to the current staging information and how it relates to the prognosis of their particular neoplastic process.        NCCN Guidelines:    I discussed the available treatment option(s) in accordance with the latest literature from the NCCN Clinical Practice Guidelines for the patient's particular type of cancer disorder. The NCCN Guidelines provide a "document evidence-based (and) consensus-driven management" of the care of oncology patients. The treatment recommendations were made not only in accordance to the NCCN guidelines, but also factored in to account the patient's overall age, " condition, performance status and their medical co-morbidities. I went over the risks and benefits of the the treatment options (if any could be made) with regard to their particular cancer type, their cancer stage, their age, and their co-morbidities.       Chemotherapy Discussion:      I discussed the available treatment option(s) in accordance with the latest/current national evidence-based guidelines (NCCN, UpToDate, NCI, ASCO, etc where applicable), their overall age/condition and their co-morbidities. I also went over the risks and benefits of the chemotherapy with regard to their particular cancer type, their cancer stage, their age/condition, and their co-morbidities. I provided literature on the chemotherapy regimen and discussed the chemotherapy side-effect profiles of the drug(s). I discussed the importance of compliance with obtaining and monitoring weekly lab work, and went over the potential hematopathology issues and risks with anemia, leucopenia and thrombocytopenia that can occur with chemotherapy. I discussed the potential risks of liver and kidney damage, which could be permanent and could necessitate dialysis long-term if kidney failure developed. I discussed the emetic and/or diarrheal potential of the regimen and the potential need for use of antiemetic and anti-diarrheal medications. I discussed the risk for development of anaphylactic shock, bronchospasm, dysrhythmia, and respiratory/cardiovascular arrest and/or failure. I discussed the potential risks for development of alopecia, cold sensory issues, ringing in ears, vertigo, cataracts, glaucoma, and neuropathy, all of which could end up being chronic and life-long. Some chemotherpyI discussed the risks of hand-foot syndrome and rashes, and development of other autoimmune mediated processes such as pneumonitis, hepatitis, and colitis which could be life threatening. I discussed the risks of the potential development of a rare but fatal viral  mediated disease known as PML (Progressive Multifocal Leukoencephalopathy), and risk of future development of leukemia and/or lymphoma from use of certain chemotherapy agents. I discussed the need for neutropenic precautions, basic hygiene/sanitation behaviors and dietary restrictions.    The patient's consent has been obtained to proceed with the chemotherapy.The patient will be referred to Chemotherapy School [facility] for training and education on chemotherapy, use of antiemetics and/or anti-diarrheals, use of NSAID's, potential chemotherapy side-effects, and any specific recommendations and precautions with the particular chemotherapy agents.      I answered all of the patient's (and family's, if applicable) questions to the best of my ability and to their complete satisfaction. The patient acknowledged full understanding of the risks, recommendations and plan(s).     I spent over 25 mins of time with the patient. Reviewed results of the recently ordered labs, tests and studies; made directives with regards to the results. Over half of this time was spent couseling and coordinating care.    I have explained all of the above in detail and the patient understands all of the current recommendation(s). I have answered all of their questions to the best of my ability and to their complete satisfaction.   The patient is to continue with the current management plan.            Electronically signed by Kip Wright MD

## 2024-05-29 ENCOUNTER — OFFICE VISIT (OUTPATIENT)
Facility: CLINIC | Age: 69
End: 2024-05-29
Payer: MEDICARE

## 2024-05-29 VITALS
HEART RATE: 56 BPM | SYSTOLIC BLOOD PRESSURE: 125 MMHG | TEMPERATURE: 97 F | BODY MASS INDEX: 25.23 KG/M2 | DIASTOLIC BLOOD PRESSURE: 76 MMHG | WEIGHT: 170.38 LBS | HEIGHT: 69 IN | RESPIRATION RATE: 16 BRPM

## 2024-05-29 DIAGNOSIS — D64.9 ANEMIA, UNSPECIFIED TYPE: ICD-10-CM

## 2024-05-29 DIAGNOSIS — C61 PROSTATE CANCER: Primary | ICD-10-CM

## 2024-05-29 PROCEDURE — 99213 OFFICE O/P EST LOW 20 MIN: CPT | Mod: PBBFAC,PN | Performed by: INTERNAL MEDICINE

## 2024-05-29 PROCEDURE — G2211 COMPLEX E/M VISIT ADD ON: HCPCS | Mod: S$PBB,,, | Performed by: INTERNAL MEDICINE

## 2024-05-29 PROCEDURE — 99999 PR PBB SHADOW E&M-EST. PATIENT-LVL III: CPT | Mod: PBBFAC,,, | Performed by: INTERNAL MEDICINE

## 2024-05-29 PROCEDURE — 99215 OFFICE O/P EST HI 40 MIN: CPT | Mod: S$PBB,,, | Performed by: INTERNAL MEDICINE

## 2024-08-13 ENCOUNTER — LAB VISIT (OUTPATIENT)
Dept: LAB | Facility: HOSPITAL | Age: 69
End: 2024-08-13
Attending: INTERNAL MEDICINE
Payer: MEDICARE

## 2024-08-13 DIAGNOSIS — C61 PROSTATE CANCER: ICD-10-CM

## 2024-08-13 DIAGNOSIS — D64.9 ANEMIA, UNSPECIFIED TYPE: ICD-10-CM

## 2024-08-13 LAB
ALBUMIN SERPL BCP-MCNC: 4.1 G/DL (ref 3.5–5.2)
ALP SERPL-CCNC: 55 U/L (ref 55–135)
ALT SERPL W/O P-5'-P-CCNC: 19 U/L (ref 10–44)
ANION GAP SERPL CALC-SCNC: 5 MMOL/L (ref 8–16)
AST SERPL-CCNC: 21 U/L (ref 10–40)
BASOPHILS # BLD AUTO: 0.04 K/UL (ref 0–0.2)
BASOPHILS NFR BLD: 1.1 % (ref 0–1.9)
BILIRUB SERPL-MCNC: 0.7 MG/DL (ref 0.1–1)
BUN SERPL-MCNC: 21 MG/DL (ref 8–23)
CALCIUM SERPL-MCNC: 9.5 MG/DL (ref 8.7–10.5)
CHLORIDE SERPL-SCNC: 106 MMOL/L (ref 95–110)
CO2 SERPL-SCNC: 28 MMOL/L (ref 23–29)
COMPLEXED PSA SERPL-MCNC: <0.01 NG/ML (ref 0–4)
CREAT SERPL-MCNC: 0.9 MG/DL (ref 0.5–1.4)
DIFFERENTIAL METHOD BLD: ABNORMAL
EOSINOPHIL # BLD AUTO: 0.3 K/UL (ref 0–0.5)
EOSINOPHIL NFR BLD: 8.6 % (ref 0–8)
ERYTHROCYTE [DISTWIDTH] IN BLOOD BY AUTOMATED COUNT: 14 % (ref 11.5–14.5)
EST. GFR  (NO RACE VARIABLE): >60 ML/MIN/1.73 M^2
FERRITIN SERPL-MCNC: 186 NG/ML (ref 20–300)
GLUCOSE SERPL-MCNC: 101 MG/DL (ref 70–110)
HCT VFR BLD AUTO: 38.4 % (ref 40–54)
HGB BLD-MCNC: 13 G/DL (ref 14–18)
IMM GRANULOCYTES # BLD AUTO: 0.01 K/UL (ref 0–0.04)
IMM GRANULOCYTES NFR BLD AUTO: 0.3 % (ref 0–0.5)
IRON SERPL-MCNC: 156 UG/DL (ref 45–160)
LYMPHOCYTES # BLD AUTO: 1.2 K/UL (ref 1–4.8)
LYMPHOCYTES NFR BLD: 31.4 % (ref 18–48)
MCH RBC QN AUTO: 32.6 PG (ref 27–31)
MCHC RBC AUTO-ENTMCNC: 33.9 G/DL (ref 32–36)
MCV RBC AUTO: 96 FL (ref 82–98)
MONOCYTES # BLD AUTO: 0.5 K/UL (ref 0.3–1)
MONOCYTES NFR BLD: 14.6 % (ref 4–15)
NEUTROPHILS # BLD AUTO: 1.6 K/UL (ref 1.8–7.7)
NEUTROPHILS NFR BLD: 44 % (ref 38–73)
NRBC BLD-RTO: 0 /100 WBC
PLATELET # BLD AUTO: 177 K/UL (ref 150–450)
PMV BLD AUTO: 11.5 FL (ref 9.2–12.9)
POTASSIUM SERPL-SCNC: 4.5 MMOL/L (ref 3.5–5.1)
PROT SERPL-MCNC: 6.7 G/DL (ref 6–8.4)
RBC # BLD AUTO: 3.99 M/UL (ref 4.6–6.2)
SATURATED IRON: 47 % (ref 20–50)
SODIUM SERPL-SCNC: 139 MMOL/L (ref 136–145)
TOTAL IRON BINDING CAPACITY: 330 UG/DL (ref 250–450)
TRANSFERRIN SERPL-MCNC: 236 MG/DL (ref 200–375)
WBC # BLD AUTO: 3.7 K/UL (ref 3.9–12.7)

## 2024-08-13 PROCEDURE — 84153 ASSAY OF PSA TOTAL: CPT | Performed by: INTERNAL MEDICINE

## 2024-08-13 PROCEDURE — 85025 COMPLETE CBC W/AUTO DIFF WBC: CPT | Performed by: INTERNAL MEDICINE

## 2024-08-13 PROCEDURE — 83540 ASSAY OF IRON: CPT | Performed by: INTERNAL MEDICINE

## 2024-08-13 PROCEDURE — 36415 COLL VENOUS BLD VENIPUNCTURE: CPT | Performed by: INTERNAL MEDICINE

## 2024-08-13 PROCEDURE — 80053 COMPREHEN METABOLIC PANEL: CPT | Performed by: INTERNAL MEDICINE

## 2024-08-13 PROCEDURE — 82728 ASSAY OF FERRITIN: CPT | Performed by: INTERNAL MEDICINE

## 2024-08-20 NOTE — PROGRESS NOTES
[facility] Hematology/Oncology  PROGRESS NOTE - Follow-up Visit      Subjective:       Patient ID:   NAME: Rusty Sen II : 1955     69 y.o. male    Referring Doc: Jamie Martin Jr., MD  Other Physicians: Merissa Sofia; Jose Angel           Chief Complaint: prostate cancer f/u       History of Present Illness:     Patient returns today for a regularly scheduled follow-up visit.  The patient is here today to go over the results of the recently ordered labs, tests and studies. He is here by himself.        He previously had completed XRT and subsequently completed the two year course of ADT and zytiga.  He sees Dr Walter again on Oct 2024    He has been back to the gym 6 days per week    His BP is better - he last saw Dr Sofia on 2024    Breathing ok, No CP, SOB, HA's or N/V; He denies any current fatigue.    He has chronic hay-fever issues                     ROS:   GEN: normal without any fever, night sweats or weight loss  HEENT: normal with no HA's, sore throat, stiff neck, changes in vision  CV: normal with no CP, SOB, PND, FAIRCHILD or orthopnea  PULM: normal with no SOB, cough, hemoptysis, sputum or pleuritic pain  GI: normal with no abdominal pain, nausea, vomiting, constipation, diarrhea, melanotic stools, BRBPR, or hematemesis  : normal with no hematuria, dysuria  BREAST: normal with no mass, discharge, pain  SKIN: normal with no rash, erythema, bruising, or swelling    Pain Scale:  0  Allergies:  Review of patient's allergies indicates:  No Known Allergies    Medications:    Current Outpatient Medications:     ramipriL (ALTACE) 10 MG capsule, Take 1 capsule (10 mg total) by mouth once daily., Disp: 90 capsule, Rfl: 3    abiraterone (ZYTIGA) 250 mg Tab, Take 4 tablets (1,000 mg total) by mouth once daily. (Patient not taking: Reported on 2024.), Disp: 120 tablet, Rfl: 11    amLODIPine (NORVASC) 5 MG tablet, Take 1 tablet (5 mg total) by mouth once daily., Disp: 90 tablet, Rfl: 1     eplerenone (INSPRA) 25 MG Tab, Take 1 tablet (25 mg total) by mouth once daily. (Patient not taking: Reported on 8/21/2024), Disp: 90 tablet, Rfl: 3    predniSONE (DELTASONE) 5 MG tablet, Take 1 tablet by mouth once daily (Patient not taking: Reported on 8/21/2024), Disp: 90 tablet, Rfl: 0    PMHx/PSHx Updates:  See patient's last visit with me on  5/29/2024  See H&P on 5/16/2022        Pathology:   Cancer Staging   Prostate cancer  Staging form: Prostate, AJCC 8th Edition  - Clinical: Stage IIIC (cT2a, cN0, cM0, PSA: 23.7, Grade Group: 5) - Signed by Jamie Martin Jr., MD on 5/6/2022      CT abdom/pelvis  4/2/7/2022:     IMPRESSION: Ovoid sclerotic lesions at L5, left-sided sacrum and left superior pubic ramus most likely representing bone island however metastatic disease cannot be excluded and correlation with bone scan is recommended     Levoscoliosis with multilevel degenerative disc disease and facet hypertrophy     Sigmoid diverticulosis without diverticulitis        Bone scan 4/27/2022:     IMPRESSION: Increased activity in joint central distribution as well as the spine compatible with degenerative changes     No evidence of metastatic disease     The abnormality seen on CT most likely represent bone islands     MRI prostate  5/12/2022:      Impression:     1. PIRADS 5.  Large lesion in the right peripheral zone with clinically significant cancer highly likely to be present.  Bulging of the prostate capsule raises the possibility of extracapsular extension.  2. Lesion is in close proximity to the right neurovascular bundle and also extends into the right seminal vesicles.  3. There are prominent but nonenlarged mesorectal lymph nodes which are indeterminate as to the possibility of metastatic disease and warrant continued attention on follow-up imaging.  4. There is also an indeterminate sclerotic bone lesion in the left pubic body.  5. Benign prostatic hypertrophy.  Overall Assessment:     PIRADS 1  "(clinically significant cancer is highly unlikely to be present)     PIRADS 2 (clinically significant cancer is unlikely to be present)     PIRADS 3 (the presence of clinically significant cancer is equivocal)     PIRADS 4 (clinically significant cancer is likely to be present)     PIRADS 5 (clinically significant cancer is highly likely to be present)     Number of targets created for potential MR/US fusion biopsy     Peripheral zone: 1     Transition zone: 0           Objective:     Vitals:  Blood pressure 121/73, pulse (!) 56, temperature 97.8 °F (36.6 °C), resp. rate 16, height 5' 9" (1.753 m), weight 78.3 kg (172 lb 11.2 oz).    Physical Examination:   GEN: no apparent distress, comfortable; AAOx3  HEAD: atraumatic and normocephalic  EYES: no pallor, no icterus, PERRLA  ENT: OMM, no pharyngeal erythema, external ears WNL; no nasal discharge; no thrush  NECK: no masses, thyroid normal, trachea midline, no LAD/LN's, supple  CV: RRR with no murmur; normal pulse; normal S1 and S2; no pedal edema  CHEST: Normal respiratory effort; CTAB; normal breath sounds; no wheeze or crackles  ABDOM: nontender and nondistended; soft; normal bowel sounds; no rebound/guarding  MUSC/Skeletal: ROM normal; no crepitus; joints normal; no deformities or arthropathy  EXTREM: no clubbing, cyanosis, inflammation or swelling  SKIN: no rashes, lesions, ulcers, petechiae or subcutaneous nodules  : no florez  NEURO: grossly intact; motor/sensory WNL; AAOx3; no tremors  PSYCH: normal mood, affect and behavior  LYMPH: normal cervical, supraclavicular, axillary and groin LN's            Labs:     Lab Results   Component Value Date    WBC 3.70 (L) 08/13/2024    HGB 13.0 (L) 08/13/2024    HCT 38.4 (L) 08/13/2024    MCV 96 08/13/2024     08/13/2024       CMP  Sodium   Date Value Ref Range Status   08/13/2024 139 136 - 145 mmol/L Final     Potassium   Date Value Ref Range Status   08/13/2024 4.5 3.5 - 5.1 mmol/L Final     Chloride   Date " Value Ref Range Status   08/13/2024 106 95 - 110 mmol/L Final     CO2   Date Value Ref Range Status   08/13/2024 28 23 - 29 mmol/L Final     Glucose   Date Value Ref Range Status   08/13/2024 101 70 - 110 mg/dL Final     BUN   Date Value Ref Range Status   08/13/2024 21 8 - 23 mg/dL Final     Creatinine   Date Value Ref Range Status   08/13/2024 0.9 0.5 - 1.4 mg/dL Final     Calcium   Date Value Ref Range Status   08/13/2024 9.5 8.7 - 10.5 mg/dL Final     Total Protein   Date Value Ref Range Status   08/13/2024 6.7 6.0 - 8.4 g/dL Final     Albumin   Date Value Ref Range Status   08/13/2024 4.1 3.5 - 5.2 g/dL Final     Total Bilirubin   Date Value Ref Range Status   08/13/2024 0.7 0.1 - 1.0 mg/dL Final     Comment:     For infants and newborns, interpretation of results should be based  on gestational age, weight and in agreement with clinical  observations.    Premature Infant recommended reference ranges:  Up to 24 hours.............<8.0 mg/dL  Up to 48 hours............<12.0 mg/dL  3-5 days..................<15.0 mg/dL  6-29 days.................<15.0 mg/dL       Alkaline Phosphatase   Date Value Ref Range Status   08/13/2024 55 55 - 135 U/L Final     AST   Date Value Ref Range Status   08/13/2024 21 10 - 40 U/L Final     ALT   Date Value Ref Range Status   08/13/2024 19 10 - 44 U/L Final     Anion Gap   Date Value Ref Range Status   08/13/2024 5 (L) 8 - 16 mmol/L Final     eGFR if    Date Value Ref Range Status   07/05/2022 >60.0 >60 mL/min/1.73 m^2 Final     eGFR if non    Date Value Ref Range Status   07/05/2022 >60.0 >60 mL/min/1.73 m^2 Final     Comment:     Calculation used to obtain the estimated glomerular filtration  rate (eGFR) is the CKD-EPI equation.        Lab Results   Component Value Date    IRON 156 08/13/2024    TRANSFERRIN 236 08/13/2024    TIBC 330 08/13/2024    FESATURATED 47 08/13/2024      Lab Results   Component Value Date    FERRITIN 186.0 08/13/2024              Lab Results   Component Value Date    NICLRUYR60 544 08/14/2023     Lab Results   Component Value Date    FOLATE >24.8 (H) 08/14/2023       PSA Diagnostic 0.00 - 4.00 ng/mL <0.01         Radiology/Diagnostic Studies:    CXR  3/1/2024:      IMPRESSION: No evidence of active cardiopulmonary disease.       CXR 2/16/2023:  IMPRESSION:  1. No acute radiographic abnormalities or detrimental changes compared to September 2021.        I have reviewed all available lab results and radiology reports.    Assessment/Plan:   (1) 69 y.o. male with diagnosis of prostate cancer who has been referred by Jamie Martin Jr., MD for evaluation by medical hematology/oncology. 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 He was recently diagnosed with prostate cancer after having been found to have an elevated PSA at 23.7. He underwent a transrectal US with biopsy with Dr Walter with  on 4/18/2022.       - Pathology came back showing adenocarcinoma in 5 out of 8 cores with: 80%  RB [place] 4+5 (Group 5), 70% RM [place] 3+4 (Group 3); 30% RA [place] 3 + 4 (Group 2); and 25% LB [place] 3 + 3.   - perineural invasion per path report; Thais 4+5  - He had a CT abdomen pelvis on 4/27 which showed sclerotic lesions at L5, left sacrum and left superior pubic ramus with differential of bone island versus metastatic disease, however, the bone scan on 4/27/2022 was negative.   - Stage IIIC (cT2a N0 M0) per Dr Martin's assessment     - reviewed and discussed the pathology and the latest NCCN guidelines (vers. 4.2022)  - he is considered high/very high risk category given the NCCN criteria  - already started on Eligard ADT about 3 weeks ago  - discussed benefit with addition of abiraterone (Zytiga)  - patient wants to be aggressive with his therapy  - will set up with chemotherapy school; discuss the side-effect profile of the drugs and provide literature on the drugs    6/22/2022:  - he is now on the abiraterone and XRT with ADT  - he is on  week #4 of the XRT  - slight drop in hgb and plats but no unexpected  - some diarrhea but very mild    8/17/2022:  - He completed XRT about 2-3 weeks ago and his symptoms improved after about 7-10 days;  - he is continued on zytiga/ADT. He had some recent labs.   - He had eligard injection last Wednesday and continues ADT every 3 months    11/16/2022:  - he is continued on the zytiga and ADT  - some fatigue  - some increase in BP -  I instructed him to monitor his BP at home and if it continues to run high then f/u with Dr Sofia sooner than March 2023 2/16/2023:  - continued on zytiga/pred and ADT  - PSA remains <0.01    5/11/2023:  - doing ok with no new issues  - PSA <0.01  - continued on the zytiga and ADT    8/17/2023:  - continued on ADT and zytiga  - PSa remains <0.01  - mild borderline anemia with normal iron panel, B1q2 and folate    2/29/2024:  - due to discontinue the Zytiga in May 2024  - PSA remains <0.01    5/29/20214:  - completed two year course of Zytiga last week  - PSA <0.01    8/21/2024:  - he completed the two year course of ADT and zytiga in May 2024  - PSA <0.01             (2) Family Hx/of prostate cancer (dad), breast ca (sister), lymphoma (dad)     (3) Hx/of left rotator cuff/acromioplasty surgery - Spet 2021 with Dr Craven     (4) GERD - prior colonoscopy with Dr Cornejo with GI in Jan 2023     (5) History of positive PPD - prior TB Gold positive   - Dr Sofia is aware    (6) Mild leucopenia and chronic mild anemia - possibly residual from the previous zytiga therapy  - mild eosinophilia - he has chronic hay-fever issues    8/21/2024  - latest  wbc at 3.7, hgb 13.0 and plats 177,000  - ANC was 1.6  - continue to monitor every 3 months       VISIT DIAGNOSES:      Prostate cancer  -     CBC Auto Differential; Standing  -     Comprehensive Metabolic Panel; Standing  -     Iron and TIBC; Standing  -     Ferritin; Standing  -     Vitamin B12; Standing  -     Folate; Standing  -      PROSTATE SPECIFIC ANTIGEN, DIAGNOSTIC; Standing    Anemia, unspecified type  -     CBC Auto Differential; Standing  -     Comprehensive Metabolic Panel; Standing  -     Iron and TIBC; Standing  -     Ferritin; Standing  -     Vitamin B12; Standing  -     Folate; Standing  -     PROSTATE SPECIFIC ANTIGEN, DIAGNOSTIC; Standing    Nutritional anemia, unspecified  -     CBC Auto Differential; Standing  -     Comprehensive Metabolic Panel; Standing  -     Iron and TIBC; Standing  -     Ferritin; Standing  -     Vitamin B12; Standing  -     Folate; Standing  -     PROSTATE SPECIFIC ANTIGEN, DIAGNOSTIC; Standing    Leukopenia, unspecified type  -     CBC Auto Differential; Standing  -     Comprehensive Metabolic Panel; Standing  -     Iron and TIBC; Standing  -     Ferritin; Standing  -     Vitamin B12; Standing  -     Folate; Standing  -     PROSTATE SPECIFIC ANTIGEN, DIAGNOSTIC; Standing          PLAN:  1. discontinued ADT and zytiga in May 2024  2  f/u with Dr Walter in Oct 2024  3. check labs every 3 months for now  3. BP Management as per primary    4. F/u with PCP, , Rad/onc as directed      RTC in 3-4 months     Fax note to Jamie Martin Jr., MD; Kimberlyn Walter [place], [place]       Discussion:     COVID-19 Discussion:    I had long discussion with patient and any applicable family about the COVID-19 coronavirus epidemic and the recommended precautions with regard to cancer and/or hematology patients. I have re-iterated the CDC recommendations for adequate hand washing, use of hand -like products, and coughing into elbow, etc. In addition, especially for our patients who are on chemotherapy and/or our otherwise immunocompromised patients, I have recommended avoidance of crowds, including movie theaters, restaurants, churches, etc. I have recommended avoidance of any sick or symptomatic family members and/or friends. I have also recommended avoidance of any raw and unwashed food  "products, and general avoidance of food items that have not been prepared by themselves. The patient has been asked to call us immediately with any symptom developments, issues, questions or other general concerns.       Pathology Discussion:    I reviewed and discussed the pathology report(s) and radiograph reports (if available) in as simple to understand and/or laymen's terms to the best of my ability. I had an indepth conversation with the patient and went over the patient's individual diagnosis based on the information that was currently available. I discussed the TNM staging process with regard to the patient's particular cancer type, and the calculated stage based on the currently available TNM data and literature. I discussed the available prognostic data with regard to the current staging information and how it relates to the prognosis of their particular neoplastic process.        NCCN Guidelines:    I discussed the available treatment option(s) in accordance with the latest literature from the NCCN Clinical Practice Guidelines for the patient's particular type of cancer disorder. The NCCN Guidelines provide a "document evidence-based (and) consensus-driven management" of the care of oncology patients. The treatment recommendations were made not only in accordance to the NCCN guidelines, but also factored in to account the patient's overall age, condition, performance status and their medical co-morbidities. I went over the risks and benefits of the the treatment options (if any could be made) with regard to their particular cancer type, their cancer stage, their age, and their co-morbidities.       Chemotherapy Discussion:      I discussed the available treatment option(s) in accordance with the latest/current national evidence-based guidelines (NCCN, UpToDate, NCI, ASCO, etc where applicable), their overall age/condition and their co-morbidities. I also went over the risks and benefits of the " chemotherapy with regard to their particular cancer type, their cancer stage, their age/condition, and their co-morbidities. I provided literature on the chemotherapy regimen and discussed the chemotherapy side-effect profiles of the drug(s). I discussed the importance of compliance with obtaining and monitoring weekly lab work, and went over the potential hematopathology issues and risks with anemia, leucopenia and thrombocytopenia that can occur with chemotherapy. I discussed the potential risks of liver and kidney damage, which could be permanent and could necessitate dialysis long-term if kidney failure developed. I discussed the emetic and/or diarrheal potential of the regimen and the potential need for use of antiemetic and anti-diarrheal medications. I discussed the risk for development of anaphylactic shock, bronchospasm, dysrhythmia, and respiratory/cardiovascular arrest and/or failure. I discussed the potential risks for development of alopecia, cold sensory issues, ringing in ears, vertigo, cataracts, glaucoma, and neuropathy, all of which could end up being chronic and life-long. Some chemotherpyI discussed the risks of hand-foot syndrome and rashes, and development of other autoimmune mediated processes such as pneumonitis, hepatitis, and colitis which could be life threatening. I discussed the risks of the potential development of a rare but fatal viral mediated disease known as PML (Progressive Multifocal Leukoencephalopathy), and risk of future development of leukemia and/or lymphoma from use of certain chemotherapy agents. I discussed the need for neutropenic precautions, basic hygiene/sanitation behaviors and dietary restrictions.    The patient's consent has been obtained to proceed with the chemotherapy.The patient will be referred to Chemotherapy School /[facility] for training and education on chemotherapy, use of antiemetics and/or anti-diarrheals, use of NSAID's, potential  chemotherapy side-effects, and any specific recommendations and precautions with the particular chemotherapy agents.      I answered all of the patient's (and family's, if applicable) questions to the best of my ability and to their complete satisfaction. The patient acknowledged full understanding of the risks, recommendations and plan(s).     I spent over 25 mins of time with the patient. Reviewed results of the recently ordered labs, tests and studies; made directives with regards to the results. Over half of this time was spent couseling and coordinating care.    I have explained all of the above in detail and the patient understands all of the current recommendation(s). I have answered all of their questions to the best of my ability and to their complete satisfaction.   The patient is to continue with the current management plan.            Electronically signed by Kip Wright MD

## 2024-08-21 ENCOUNTER — OFFICE VISIT (OUTPATIENT)
Facility: CLINIC | Age: 69
End: 2024-08-21
Payer: MEDICARE

## 2024-08-21 VITALS
RESPIRATION RATE: 16 BRPM | WEIGHT: 172.69 LBS | HEIGHT: 69 IN | BODY MASS INDEX: 25.58 KG/M2 | HEART RATE: 56 BPM | SYSTOLIC BLOOD PRESSURE: 121 MMHG | DIASTOLIC BLOOD PRESSURE: 73 MMHG | TEMPERATURE: 98 F

## 2024-08-21 DIAGNOSIS — C61 PROSTATE CANCER: Primary | ICD-10-CM

## 2024-08-21 DIAGNOSIS — D64.9 ANEMIA, UNSPECIFIED TYPE: ICD-10-CM

## 2024-08-21 DIAGNOSIS — D53.9 NUTRITIONAL ANEMIA, UNSPECIFIED: ICD-10-CM

## 2024-08-21 DIAGNOSIS — D72.819 LEUKOPENIA, UNSPECIFIED TYPE: ICD-10-CM

## 2024-08-21 PROCEDURE — 99213 OFFICE O/P EST LOW 20 MIN: CPT | Mod: PBBFAC,PN | Performed by: INTERNAL MEDICINE

## 2024-08-21 PROCEDURE — 99999 PR PBB SHADOW E&M-EST. PATIENT-LVL III: CPT | Mod: PBBFAC,,, | Performed by: INTERNAL MEDICINE

## 2024-12-02 ENCOUNTER — TELEPHONE (OUTPATIENT)
Facility: CLINIC | Age: 69
End: 2024-12-02
Payer: MEDICARE

## 2024-12-02 NOTE — TELEPHONE ENCOUNTER
I spoke with pt and reminded him to have labs done prior to appt on 12/9/24 pt verbalized understanding.

## 2024-12-04 ENCOUNTER — LAB VISIT (OUTPATIENT)
Dept: LAB | Facility: HOSPITAL | Age: 69
End: 2024-12-04
Attending: INTERNAL MEDICINE
Payer: MEDICARE

## 2024-12-04 ENCOUNTER — TELEPHONE (OUTPATIENT)
Facility: CLINIC | Age: 69
End: 2024-12-04
Payer: MEDICARE

## 2024-12-04 DIAGNOSIS — D64.9 ANEMIA, UNSPECIFIED TYPE: ICD-10-CM

## 2024-12-04 DIAGNOSIS — D72.819 LEUKOPENIA, UNSPECIFIED TYPE: ICD-10-CM

## 2024-12-04 DIAGNOSIS — D53.9 NUTRITIONAL ANEMIA, UNSPECIFIED: ICD-10-CM

## 2024-12-04 DIAGNOSIS — C61 PROSTATE CANCER: ICD-10-CM

## 2024-12-04 LAB
ALBUMIN SERPL BCP-MCNC: 4.1 G/DL (ref 3.5–5.2)
ALP SERPL-CCNC: 69 U/L (ref 55–135)
ALT SERPL W/O P-5'-P-CCNC: 22 U/L (ref 10–44)
ANION GAP SERPL CALC-SCNC: 3 MMOL/L (ref 8–16)
AST SERPL-CCNC: 24 U/L (ref 10–40)
BILIRUB SERPL-MCNC: 0.7 MG/DL (ref 0.1–1)
BUN SERPL-MCNC: 28 MG/DL (ref 8–23)
CALCIUM SERPL-MCNC: 9.9 MG/DL (ref 8.7–10.5)
CHLORIDE SERPL-SCNC: 104 MMOL/L (ref 95–110)
CO2 SERPL-SCNC: 31 MMOL/L (ref 23–29)
COMPLEXED PSA SERPL-MCNC: 0.02 NG/ML (ref 0–4)
CREAT SERPL-MCNC: 0.9 MG/DL (ref 0.5–1.4)
EST. GFR  (NO RACE VARIABLE): >60 ML/MIN/1.73 M^2
FERRITIN SERPL-MCNC: 142.7 NG/ML (ref 20–300)
FOLATE SERPL-MCNC: >22.3 NG/ML (ref 4–24)
GLUCOSE SERPL-MCNC: 100 MG/DL (ref 70–110)
IRON SERPL-MCNC: 124 UG/DL (ref 45–160)
POTASSIUM SERPL-SCNC: 4.3 MMOL/L (ref 3.5–5.1)
PROT SERPL-MCNC: 7 G/DL (ref 6–8.4)
SATURATED IRON: 38 % (ref 20–50)
SODIUM SERPL-SCNC: 138 MMOL/L (ref 136–145)
TOTAL IRON BINDING CAPACITY: 328 UG/DL (ref 250–450)
TRANSFERRIN SERPL-MCNC: 234 MG/DL (ref 200–375)
VIT B12 SERPL-MCNC: 660 PG/ML (ref 210–950)

## 2024-12-04 PROCEDURE — 82746 ASSAY OF FOLIC ACID SERUM: CPT | Performed by: INTERNAL MEDICINE

## 2024-12-04 PROCEDURE — 84466 ASSAY OF TRANSFERRIN: CPT | Performed by: INTERNAL MEDICINE

## 2024-12-04 PROCEDURE — 82728 ASSAY OF FERRITIN: CPT | Performed by: INTERNAL MEDICINE

## 2024-12-04 PROCEDURE — 80053 COMPREHEN METABOLIC PANEL: CPT | Performed by: INTERNAL MEDICINE

## 2024-12-04 PROCEDURE — 36415 COLL VENOUS BLD VENIPUNCTURE: CPT | Performed by: INTERNAL MEDICINE

## 2024-12-04 PROCEDURE — 82607 VITAMIN B-12: CPT | Performed by: INTERNAL MEDICINE

## 2024-12-04 PROCEDURE — 84153 ASSAY OF PSA TOTAL: CPT | Performed by: INTERNAL MEDICINE

## 2024-12-04 NOTE — TELEPHONE ENCOUNTER
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  Informed him that the CBC was not run today.  The order was present, yet not linked to to today's visit.  He agreed to go back to [facility] for a lab draw tomorrow prior to his dermatology appointment.  He was very understanding. YONATAN Fournier notified.

## 2024-12-05 ENCOUNTER — LAB VISIT (OUTPATIENT)
Dept: LAB | Facility: HOSPITAL | Age: 69
End: 2024-12-05
Attending: INTERNAL MEDICINE
Payer: MEDICARE

## 2024-12-05 DIAGNOSIS — D53.9 NUTRITIONAL ANEMIA, UNSPECIFIED: ICD-10-CM

## 2024-12-05 DIAGNOSIS — D64.9 ANEMIA, UNSPECIFIED TYPE: ICD-10-CM

## 2024-12-05 DIAGNOSIS — D72.819 LEUKOPENIA, UNSPECIFIED TYPE: ICD-10-CM

## 2024-12-05 DIAGNOSIS — C61 PROSTATE CANCER: ICD-10-CM

## 2024-12-05 LAB
BASOPHILS # BLD AUTO: 0.04 K/UL (ref 0–0.2)
BASOPHILS NFR BLD: 1 % (ref 0–1.9)
DIFFERENTIAL METHOD BLD: ABNORMAL
EOSINOPHIL # BLD AUTO: 0.2 K/UL (ref 0–0.5)
EOSINOPHIL NFR BLD: 5.8 % (ref 0–8)
ERYTHROCYTE [DISTWIDTH] IN BLOOD BY AUTOMATED COUNT: 14.2 % (ref 11.5–14.5)
HCT VFR BLD AUTO: 41.3 % (ref 40–54)
HGB BLD-MCNC: 13.9 G/DL (ref 14–18)
IMM GRANULOCYTES # BLD AUTO: 0 K/UL (ref 0–0.04)
IMM GRANULOCYTES NFR BLD AUTO: 0 % (ref 0–0.5)
LYMPHOCYTES # BLD AUTO: 1.2 K/UL (ref 1–4.8)
LYMPHOCYTES NFR BLD: 30.4 % (ref 18–48)
MCH RBC QN AUTO: 31.6 PG (ref 27–31)
MCHC RBC AUTO-ENTMCNC: 33.7 G/DL (ref 32–36)
MCV RBC AUTO: 94 FL (ref 82–98)
MONOCYTES # BLD AUTO: 0.6 K/UL (ref 0.3–1)
MONOCYTES NFR BLD: 15.2 % (ref 4–15)
NEUTROPHILS # BLD AUTO: 1.9 K/UL (ref 1.8–7.7)
NEUTROPHILS NFR BLD: 47.6 % (ref 38–73)
NRBC BLD-RTO: 0 /100 WBC
PLATELET # BLD AUTO: 190 K/UL (ref 150–450)
PMV BLD AUTO: 11 FL (ref 9.2–12.9)
RBC # BLD AUTO: 4.4 M/UL (ref 4.6–6.2)
WBC # BLD AUTO: 3.95 K/UL (ref 3.9–12.7)

## 2024-12-05 PROCEDURE — 36415 COLL VENOUS BLD VENIPUNCTURE: CPT | Performed by: INTERNAL MEDICINE

## 2024-12-05 PROCEDURE — 85025 COMPLETE CBC W/AUTO DIFF WBC: CPT | Performed by: INTERNAL MEDICINE

## 2024-12-06 PROBLEM — Z85.46 HISTORY OF PROSTATE CANCER: Status: ACTIVE | Noted: 2022-05-05

## 2024-12-06 NOTE — PROGRESS NOTES
[facility] Hematology/Oncology  PROGRESS NOTE - Follow-up Visit      Subjective:       Patient ID:   NAME: Rusty Sen II : 1955     69 y.o. male    Referring Doc: Jamie Martin Jr., MD  Other Physicians: Merissa Sofia; Jose Angel           Chief Complaint: prostate cancer f/u       History of Present Illness:     Patient returns today for a regularly scheduled follow-up visit.  The patient is here today to go over the results of the recently ordered labs, tests and studies. He is here by himself.        He previously had completed XRT and subsequently completed the two year course of ADT and zytiga.  He saw Dr Walter in Oct 2024 and sees him again in 2025    He saw Dr Geena Adames with derm on 10/17    He has been back to the gym 6 days per week    Breathing ok, No CP, SOB, HA's or N/V; He denies any current fatigue.    He has chronic hay-fever issues                     ROS:   GEN: normal without any fever, night sweats or weight loss  HEENT: normal with no HA's, sore throat, stiff neck, changes in vision  CV: normal with no CP, SOB, PND, FAIRCHILD or orthopnea  PULM: normal with no SOB, cough, hemoptysis, sputum or pleuritic pain  GI: normal with no abdominal pain, nausea, vomiting, constipation, diarrhea, melanotic stools, BRBPR, or hematemesis  : normal with no hematuria, dysuria  BREAST: normal with no mass, discharge, pain  SKIN: normal with no rash, erythema, bruising, or swelling    Pain Scale:  0  Allergies:  Review of patient's allergies indicates:  No Known Allergies    Medications:    Current Outpatient Medications:     abiraterone (ZYTIGA) 250 mg Tab, Take 4 tablets (1,000 mg total) by mouth once daily. (Patient not taking: Reported on 2024.), Disp: 120 tablet, Rfl: 11    amLODIPine (NORVASC) 5 MG tablet, Take 1 tablet (5 mg total) by mouth once daily., Disp: 90 tablet, Rfl: 1    amoxicillin (AMOXIL) 500 MG capsule, TAKE 1 CAPSULE BY MOUTH THREE TIMES DAILY UNTIL GONE (Patient not  taking: Reported on 12/9/2024), Disp: , Rfl:     eplerenone (INSPRA) 25 MG Tab, Take 1 tablet (25 mg total) by mouth once daily. (Patient not taking: Reported on 8/21/2024), Disp: 90 tablet, Rfl: 3    predniSONE (DELTASONE) 5 MG tablet, Take 1 tablet by mouth once daily (Patient not taking: Reported on 8/21/2024), Disp: 90 tablet, Rfl: 0    ramipriL (ALTACE) 10 MG capsule, Take 1 capsule (10 mg total) by mouth once daily. (Patient not taking: Reported on 12/9/2024), Disp: 90 capsule, Rfl: 3    PMHx/PSHx Updates:  See patient's last visit with me on  8/21/2024  See H&P on 5/16/2022        Pathology:   Cancer Staging   History of prostate cancer  Staging form: Prostate, AJCC 8th Edition  - Clinical: Stage IIIC (cT2a, cN0, cM0, PSA: 23.7, Grade Group: 5) - Signed by Jamie Martin Jr., MD on 5/6/2022      CT abdom/pelvis  4/2/7/2022:     IMPRESSION: Ovoid sclerotic lesions at L5, left-sided sacrum and left superior pubic ramus most likely representing bone island however metastatic disease cannot be excluded and correlation with bone scan is recommended     Levoscoliosis with multilevel degenerative disc disease and facet hypertrophy     Sigmoid diverticulosis without diverticulitis        Bone scan 4/27/2022:     IMPRESSION: Increased activity in joint central distribution as well as the spine compatible with degenerative changes     No evidence of metastatic disease     The abnormality seen on CT most likely represent bone islands     MRI prostate  5/12/2022:      Impression:     1. PIRADS 5.  Large lesion in the right peripheral zone with clinically significant cancer highly likely to be present.  Bulging of the prostate capsule raises the possibility of extracapsular extension.  2. Lesion is in close proximity to the right neurovascular bundle and also extends into the right seminal vesicles.  3. There are prominent but nonenlarged mesorectal lymph nodes which are indeterminate as to the possibility of metastatic  "disease and warrant continued attention on follow-up imaging.  4. There is also an indeterminate sclerotic bone lesion in the left pubic body.  5. Benign prostatic hypertrophy.  Overall Assessment:     PIRADS 1 (clinically significant cancer is highly unlikely to be present)     PIRADS 2 (clinically significant cancer is unlikely to be present)     PIRADS 3 (the presence of clinically significant cancer is equivocal)     PIRADS 4 (clinically significant cancer is likely to be present)     PIRADS 5 (clinically significant cancer is highly likely to be present)     Number of targets created for potential MR/US fusion biopsy     Peripheral zone: 1     Transition zone: 0           Objective:     Vitals:  Blood pressure (!) 144/82, pulse (!) 54, temperature 98 °F (36.7 °C), temperature source Temporal, resp. rate 17, height 5' 9" (1.753 m), weight 80 kg (176 lb 6.4 oz).    Physical Examination:   GEN: no apparent distress, comfortable; AAOx3  HEAD: atraumatic and normocephalic  EYES: no pallor, no icterus, PERRLA  ENT: OMM, no pharyngeal erythema, external ears WNL; no nasal discharge; no thrush  NECK: no masses, thyroid normal, trachea midline, no LAD/LN's, supple  CV: RRR with no murmur; normal pulse; normal S1 and S2; no pedal edema  CHEST: Normal respiratory effort; CTAB; normal breath sounds; no wheeze or crackles  ABDOM: nontender and nondistended; soft; normal bowel sounds; no rebound/guarding  MUSC/Skeletal: ROM normal; no crepitus; joints normal; no deformities or arthropathy  EXTREM: no clubbing, cyanosis, inflammation or swelling  SKIN: no rashes, lesions, ulcers, petechiae or subcutaneous nodules  : no florez  NEURO: grossly intact; motor/sensory WNL; AAOx3; no tremors  PSYCH: normal mood, affect and behavior  LYMPH: normal cervical, supraclavicular, axillary and groin LN's            Labs:     Lab Results   Component Value Date    WBC 3.95 12/05/2024    HGB 13.9 (L) 12/05/2024    HCT 41.3 12/05/2024    MCV " 94 12/05/2024     12/05/2024       CMP  Sodium   Date Value Ref Range Status   12/04/2024 138 136 - 145 mmol/L Final     Potassium   Date Value Ref Range Status   12/04/2024 4.3 3.5 - 5.1 mmol/L Final     Chloride   Date Value Ref Range Status   12/04/2024 104 95 - 110 mmol/L Final     CO2   Date Value Ref Range Status   12/04/2024 31 (H) 23 - 29 mmol/L Final     Glucose   Date Value Ref Range Status   12/04/2024 100 70 - 110 mg/dL Final     BUN   Date Value Ref Range Status   12/04/2024 28 (H) 8 - 23 mg/dL Final     Creatinine   Date Value Ref Range Status   12/04/2024 0.9 0.5 - 1.4 mg/dL Final     Calcium   Date Value Ref Range Status   12/04/2024 9.9 8.7 - 10.5 mg/dL Final     Total Protein   Date Value Ref Range Status   12/04/2024 7.0 6.0 - 8.4 g/dL Final     Albumin   Date Value Ref Range Status   12/04/2024 4.1 3.5 - 5.2 g/dL Final     Total Bilirubin   Date Value Ref Range Status   12/04/2024 0.7 0.1 - 1.0 mg/dL Final     Comment:     For infants and newborns, interpretation of results should be based  on gestational age, weight and in agreement with clinical  observations.    Premature Infant recommended reference ranges:  Up to 24 hours.............<8.0 mg/dL  Up to 48 hours............<12.0 mg/dL  3-5 days..................<15.0 mg/dL  6-29 days.................<15.0 mg/dL       Alkaline Phosphatase   Date Value Ref Range Status   12/04/2024 69 55 - 135 U/L Final     AST   Date Value Ref Range Status   12/04/2024 24 10 - 40 U/L Final     ALT   Date Value Ref Range Status   12/04/2024 22 10 - 44 U/L Final     Anion Gap   Date Value Ref Range Status   12/04/2024 3 (L) 8 - 16 mmol/L Final     eGFR if    Date Value Ref Range Status   07/05/2022 >60.0 >60 mL/min/1.73 m^2 Final     eGFR if non    Date Value Ref Range Status   07/05/2022 >60.0 >60 mL/min/1.73 m^2 Final     Comment:     Calculation used to obtain the estimated glomerular filtration  rate (eGFR) is the  CKD-EPI equation.        Lab Results   Component Value Date    IRON 124 12/04/2024    TRANSFERRIN 234 12/04/2024    TIBC 328 12/04/2024    FESATURATED 38 12/04/2024      Lab Results   Component Value Date    FERRITIN 142.7 12/04/2024             Lab Results   Component Value Date    AUITHCTV32 660 12/04/2024     Lab Results   Component Value Date    FOLATE >22.3 12/04/2024       PSA Diagnostic 0.00 - 4.00 ng/mL 0.02         Radiology/Diagnostic Studies:    CXR  3/1/2024:      IMPRESSION: No evidence of active cardiopulmonary disease.       CXR 2/16/2023:  IMPRESSION:  1. No acute radiographic abnormalities or detrimental changes compared to September 2021.        I have reviewed all available lab results and radiology reports.    Assessment/Plan:   (1) 69 y.o. male with diagnosis of prostate cancer who has been referred by Jamie Martin Jr., MD for evaluation by medical hematology/oncology. 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 He was recently diagnosed with prostate cancer after having been found to have an elevated PSA at 23.7. He underwent a transrectal US with biopsy with Dr Walter with  on 4/18/2022.       - Pathology came back showing adenocarcinoma in 5 out of 8 cores with: 80%  RB Bandana 4+5 (Group 5), 70% RM Thais 3+4 (Group 3); 30% RA Bandana 3 + 4 (Group 2); and 25% LB Thais 3 + 3.   - perineural invasion per path report; Bandana 4+5  - He had a CT abdomen pelvis on 4/27 which showed sclerotic lesions at L5, left sacrum and left superior pubic ramus with differential of bone island versus metastatic disease, however, the bone scan on 4/27/2022 was negative.   - Stage IIIC (cT2a N0 M0) per Dr Martin's assessment     - reviewed and discussed the pathology and the latest NCCN guidelines (vers. 4.2022)  - he is considered high/very high risk category given the NCCN criteria  - already started on Eligard ADT about 3 weeks ago  - discussed benefit with addition of abiraterone (Zytiga)  -  patient wants to be aggressive with his therapy  - will set up with chemotherapy school; discuss the side-effect profile of the drugs and provide literature on the drugs    6/22/2022:  - he is now on the abiraterone and XRT with ADT  - he is on week #4 of the XRT  - slight drop in hgb and plats but no unexpected  - some diarrhea but very mild    8/17/2022:  - He completed XRT about 2-3 weeks ago and his symptoms improved after about 7-10 days;  - he is continued on zytiga/ADT. He had some recent labs.   - He had eligard injection last Wednesday and continues ADT every 3 months    11/16/2022:  - he is continued on the zytiga and ADT  - some fatigue  - some increase in BP -  I instructed him to monitor his BP at home and if it continues to run high then f/u with Dr Sofia sooner than March 2023 2/16/2023:  - continued on zytiga/pred and ADT  - PSA remains <0.01    5/11/2023:  - doing ok with no new issues  - PSA <0.01  - continued on the zytiga and ADT    8/17/2023:  - continued on ADT and zytiga  - PSa remains <0.01  - mild borderline anemia with normal iron panel, B1q2 and folate    2/29/2024:  - due to discontinue the Zytiga in May 2024  - PSA remains <0.01    5/29/20214:  - completed two year course of Zytiga last week  - PSA <0.01    8/21/2024:  - he completed the two year course of ADT and zytiga in May 2024  - PSA <0.01    12/9/2024:  - CBC overall adequate with WBC and plat WNL and hgb practically WNL at 13.9  - PSA at 0.02  - sees Dr Walter in Jan 2025 with   - iron panel and B12/folate adequate  - continue ion MVI             (2) Family Hx/of prostate cancer (dad), breast ca (sister), lymphoma (dad)     (3) Hx/of left rotator cuff/acromioplasty surgery - Spet 2021 with Dr Craven     (4) GERD - prior colonoscopy with Dr Cornejo with GI in Jan 2023     (5) History of positive PPD - prior TB Gold positive   - Dr Sofia is aware    (6) Mild leucopenia and chronic mild anemia - possibly residual from  the previous zytiga therapy  - mild eosinophilia - he has chronic hay-fever issues    8/21/2024  - latest  wbc at 3.7, hgb 13.0 and plats 177,000  - ANC was 1.6  - continue to monitor every 3 months    12/9/2024:  - latest labs fairly adequate  -  CBC overall adequate with WBC and plat WNL and hgb practically WNL at 13.9  - iron panel and B12/folate adequate    VISIT DIAGNOSES:      History of prostate cancer          PLAN:  1. discontinued ADT and zytiga in May 2024  2  f/u with Dr Walter in Jan 2025  3. check labs every 3 months for now unless otherwise directed by   3. BP Management as per primary    4. F/u with PCP, , Rad/onc as directed      RTC in 3-4 months     Fax note to Jamie Martin Jr., MD; Kimberlyn Walter [place], [place]       Discussion:     COVID-19 Discussion:    I had long discussion with patient and any applicable family about the COVID-19 coronavirus epidemic and the recommended precautions with regard to cancer and/or hematology patients. I have re-iterated the CDC recommendations for adequate hand washing, use of hand -like products, and coughing into elbow, etc. In addition, especially for our patients who are on chemotherapy and/or our otherwise immunocompromised patients, I have recommended avoidance of crowds, including movie theaters, restaurants, churches, etc. I have recommended avoidance of any sick or symptomatic family members and/or friends. I have also recommended avoidance of any raw and unwashed food products, and general avoidance of food items that have not been prepared by themselves. The patient has been asked to call us immediately with any symptom developments, issues, questions or other general concerns.       Pathology Discussion:    I reviewed and discussed the pathology report(s) and radiograph reports (if available) in as simple to understand and/or laymen's terms to the best of my ability. I had an indepth conversation with the patient  "and went over the patient's individual diagnosis based on the information that was currently available. I discussed the TNM staging process with regard to the patient's particular cancer type, and the calculated stage based on the currently available TNM data and literature. I discussed the available prognostic data with regard to the current staging information and how it relates to the prognosis of their particular neoplastic process.        NCCN Guidelines:    I discussed the available treatment option(s) in accordance with the latest literature from the NCCN Clinical Practice Guidelines for the patient's particular type of cancer disorder. The NCCN Guidelines provide a "document evidence-based (and) consensus-driven management" of the care of oncology patients. The treatment recommendations were made not only in accordance to the NCCN guidelines, but also factored in to account the patient's overall age, condition, performance status and their medical co-morbidities. I went over the risks and benefits of the the treatment options (if any could be made) with regard to their particular cancer type, their cancer stage, their age, and their co-morbidities.       Chemotherapy Discussion:      I discussed the available treatment option(s) in accordance with the latest/current national evidence-based guidelines (NCCN, UpToDate, NCI, ASCO, etc where applicable), their overall age/condition and their co-morbidities. I also went over the risks and benefits of the chemotherapy with regard to their particular cancer type, their cancer stage, their age/condition, and their co-morbidities. I provided literature on the chemotherapy regimen and discussed the chemotherapy side-effect profiles of the drug(s). I discussed the importance of compliance with obtaining and monitoring weekly lab work, and went over the potential hematopathology issues and risks with anemia, leucopenia and thrombocytopenia that can occur with " chemotherapy. I discussed the potential risks of liver and kidney damage, which could be permanent and could necessitate dialysis long-term if kidney failure developed. I discussed the emetic and/or diarrheal potential of the regimen and the potential need for use of antiemetic and anti-diarrheal medications. I discussed the risk for development of anaphylactic shock, bronchospasm, dysrhythmia, and respiratory/cardiovascular arrest and/or failure. I discussed the potential risks for development of alopecia, cold sensory issues, ringing in ears, vertigo, cataracts, glaucoma, and neuropathy, all of which could end up being chronic and life-long. Some chemotherpyI discussed the risks of hand-foot syndrome and rashes, and development of other autoimmune mediated processes such as pneumonitis, hepatitis, and colitis which could be life threatening. I discussed the risks of the potential development of a rare but fatal viral mediated disease known as PML (Progressive Multifocal Leukoencephalopathy), and risk of future development of leukemia and/or lymphoma from use of certain chemotherapy agents. I discussed the need for neutropenic precautions, basic hygiene/sanitation behaviors and dietary restrictions.    The patient's consent has been obtained to proceed with the chemotherapy.The patient will be referred to Chemotherapy School /[facility] for training and education on chemotherapy, use of antiemetics and/or anti-diarrheals, use of NSAID's, potential chemotherapy side-effects, and any specific recommendations and precautions with the particular chemotherapy agents.      I answered all of the patient's (and family's, if applicable) questions to the best of my ability and to their complete satisfaction. The patient acknowledged full understanding of the risks, recommendations and plan(s).     I spent over 25 mins of time with the patient. Reviewed results of the recently ordered labs, tests and studies; made  directives with regards to the results. Over half of this time was spent couseling and coordinating care.    I have explained all of the above in detail and the patient understands all of the current recommendation(s). I have answered all of their questions to the best of my ability and to their complete satisfaction.   The patient is to continue with the current management plan.            Electronically signed by Kip Wright MD

## 2024-12-09 ENCOUNTER — OFFICE VISIT (OUTPATIENT)
Facility: CLINIC | Age: 69
End: 2024-12-09
Payer: MEDICARE

## 2024-12-09 VITALS
DIASTOLIC BLOOD PRESSURE: 82 MMHG | HEIGHT: 69 IN | BODY MASS INDEX: 26.12 KG/M2 | WEIGHT: 176.38 LBS | RESPIRATION RATE: 17 BRPM | TEMPERATURE: 98 F | SYSTOLIC BLOOD PRESSURE: 144 MMHG | HEART RATE: 54 BPM

## 2024-12-09 DIAGNOSIS — Z85.46 HISTORY OF PROSTATE CANCER: Primary | ICD-10-CM

## 2024-12-09 PROCEDURE — 99999 PR PBB SHADOW E&M-EST. PATIENT-LVL III: CPT | Mod: PBBFAC,,, | Performed by: INTERNAL MEDICINE

## 2024-12-09 PROCEDURE — 99215 OFFICE O/P EST HI 40 MIN: CPT | Mod: S$PBB,,, | Performed by: INTERNAL MEDICINE

## 2024-12-09 PROCEDURE — 99213 OFFICE O/P EST LOW 20 MIN: CPT | Mod: PBBFAC,PN | Performed by: INTERNAL MEDICINE

## 2024-12-09 PROCEDURE — G2211 COMPLEX E/M VISIT ADD ON: HCPCS | Mod: S$PBB,,, | Performed by: INTERNAL MEDICINE

## 2025-03-17 ENCOUNTER — LAB VISIT (OUTPATIENT)
Dept: LAB | Facility: HOSPITAL | Age: 70
End: 2025-03-17
Attending: INTERNAL MEDICINE
Payer: MEDICARE

## 2025-03-17 DIAGNOSIS — D64.9 ANEMIA, UNSPECIFIED TYPE: ICD-10-CM

## 2025-03-17 DIAGNOSIS — C61 PROSTATE CANCER: ICD-10-CM

## 2025-03-17 LAB
ALBUMIN SERPL BCP-MCNC: 4.3 G/DL (ref 3.5–5.2)
ALP SERPL-CCNC: 77 U/L (ref 55–135)
ALT SERPL W/O P-5'-P-CCNC: 18 U/L (ref 10–44)
ANION GAP SERPL CALC-SCNC: 7 MMOL/L (ref 8–16)
AST SERPL-CCNC: 19 U/L (ref 10–40)
BASOPHILS # BLD AUTO: 0.03 K/UL (ref 0–0.2)
BASOPHILS NFR BLD: 0.7 % (ref 0–1.9)
BILIRUB SERPL-MCNC: 0.6 MG/DL (ref 0.1–1)
BUN SERPL-MCNC: 18 MG/DL (ref 8–23)
CALCIUM SERPL-MCNC: 10.1 MG/DL (ref 8.7–10.5)
CHLORIDE SERPL-SCNC: 103 MMOL/L (ref 95–110)
CO2 SERPL-SCNC: 28 MMOL/L (ref 23–29)
COMPLEXED PSA SERPL-MCNC: 0.06 NG/ML (ref 0–4)
CREAT SERPL-MCNC: 0.9 MG/DL (ref 0.5–1.4)
DIFFERENTIAL METHOD BLD: ABNORMAL
EOSINOPHIL # BLD AUTO: 0.1 K/UL (ref 0–0.5)
EOSINOPHIL NFR BLD: 3.5 % (ref 0–8)
ERYTHROCYTE [DISTWIDTH] IN BLOOD BY AUTOMATED COUNT: 14.4 % (ref 11.5–14.5)
EST. GFR  (NO RACE VARIABLE): >60 ML/MIN/1.73 M^2
FERRITIN SERPL-MCNC: 98.9 NG/ML (ref 20–300)
GLUCOSE SERPL-MCNC: 115 MG/DL (ref 70–110)
HCT VFR BLD AUTO: 41.8 % (ref 40–54)
HGB BLD-MCNC: 13.7 G/DL (ref 14–18)
IMM GRANULOCYTES # BLD AUTO: 0.01 K/UL (ref 0–0.04)
IMM GRANULOCYTES NFR BLD AUTO: 0.2 % (ref 0–0.5)
IRON SERPL-MCNC: 100 UG/DL (ref 45–160)
LYMPHOCYTES # BLD AUTO: 1.3 K/UL (ref 1–4.8)
LYMPHOCYTES NFR BLD: 31.9 % (ref 18–48)
MCH RBC QN AUTO: 30.8 PG (ref 27–31)
MCHC RBC AUTO-ENTMCNC: 32.8 G/DL (ref 32–36)
MCV RBC AUTO: 94 FL (ref 82–98)
MONOCYTES # BLD AUTO: 0.6 K/UL (ref 0.3–1)
MONOCYTES NFR BLD: 13.6 % (ref 4–15)
NEUTROPHILS # BLD AUTO: 2 K/UL (ref 1.8–7.7)
NEUTROPHILS NFR BLD: 50.1 % (ref 38–73)
NRBC BLD-RTO: 0 /100 WBC
PLATELET # BLD AUTO: 199 K/UL (ref 150–450)
PMV BLD AUTO: 11.3 FL (ref 9.2–12.9)
POTASSIUM SERPL-SCNC: 4.7 MMOL/L (ref 3.5–5.1)
PROT SERPL-MCNC: 6.7 G/DL (ref 6–8.4)
RBC # BLD AUTO: 4.45 M/UL (ref 4.6–6.2)
SATURATED IRON: 29 % (ref 20–50)
SODIUM SERPL-SCNC: 138 MMOL/L (ref 136–145)
TOTAL IRON BINDING CAPACITY: 349 UG/DL (ref 250–450)
TRANSFERRIN SERPL-MCNC: 249 MG/DL (ref 200–375)
WBC # BLD AUTO: 4.04 K/UL (ref 3.9–12.7)

## 2025-03-17 PROCEDURE — 83540 ASSAY OF IRON: CPT | Performed by: INTERNAL MEDICINE

## 2025-03-17 PROCEDURE — 36415 COLL VENOUS BLD VENIPUNCTURE: CPT | Performed by: INTERNAL MEDICINE

## 2025-03-17 PROCEDURE — 84153 ASSAY OF PSA TOTAL: CPT | Performed by: INTERNAL MEDICINE

## 2025-03-17 PROCEDURE — 85025 COMPLETE CBC W/AUTO DIFF WBC: CPT | Performed by: INTERNAL MEDICINE

## 2025-03-17 PROCEDURE — 80053 COMPREHEN METABOLIC PANEL: CPT | Performed by: INTERNAL MEDICINE

## 2025-03-17 PROCEDURE — 82728 ASSAY OF FERRITIN: CPT | Performed by: INTERNAL MEDICINE

## 2025-03-24 ENCOUNTER — OFFICE VISIT (OUTPATIENT)
Facility: CLINIC | Age: 70
End: 2025-03-24
Payer: MEDICARE

## 2025-03-24 VITALS
HEART RATE: 46 BPM | WEIGHT: 176 LBS | BODY MASS INDEX: 26.07 KG/M2 | TEMPERATURE: 98 F | OXYGEN SATURATION: 97 % | RESPIRATION RATE: 16 BRPM | HEIGHT: 69 IN

## 2025-03-24 DIAGNOSIS — Z85.46 HISTORY OF PROSTATE CANCER: Primary | ICD-10-CM

## 2025-03-24 DIAGNOSIS — R71.8 OTHER ABNORMALITY OF RED BLOOD CELLS: ICD-10-CM

## 2025-03-24 PROCEDURE — 99213 OFFICE O/P EST LOW 20 MIN: CPT | Mod: PBBFAC,PN | Performed by: INTERNAL MEDICINE

## 2025-03-24 PROCEDURE — 99999 PR PBB SHADOW E&M-EST. PATIENT-LVL III: CPT | Mod: PBBFAC,,, | Performed by: INTERNAL MEDICINE

## 2025-03-24 NOTE — PROGRESS NOTES
[facility] Hematology/Oncology  PROGRESS NOTE - Follow-up Visit      Subjective:       Patient ID:   NAME: Rusty Sen II : 1955     70 y.o. male    Referring Doc: Jamie Martin Jr., MD  Other Physicians: Merissa Sofia; Jose Angel           Chief Complaint: prostate cancer f/u       History of Present Illness:     Patient returns today for a regularly scheduled follow-up visit.  The patient is here today to go over the results of the recently ordered labs, tests and studies. He is here by himself.        He previously had completed XRT and subsequently completed the two year course of ADT and zytiga.  He sees Dr Walter again in a couple of months    He had colonoscopy in 2024 with Dr Cornejo    He saw Dr Geena Adames with derm on  2025    Breathing ok, No CP, SOB, HA's or N/V; He denies any current fatigue.                       ROS:   GEN: normal without any fever, night sweats or weight loss  HEENT: normal with no HA's, sore throat, stiff neck, changes in vision  CV: normal with no CP, SOB, PND, FAIRCHILD or orthopnea  PULM: normal with no SOB, cough, hemoptysis, sputum or pleuritic pain  GI: normal with no abdominal pain, nausea, vomiting, constipation, diarrhea, melanotic stools, BRBPR, or hematemesis  : normal with no hematuria, dysuria  BREAST: normal with no mass, discharge, pain  SKIN: normal with no rash, erythema, bruising, or swelling    Pain Scale:  0  Allergies:  Review of patient's allergies indicates:  No Known Allergies    Medications:    Current Outpatient Medications:     amLODIPine (NORVASC) 5 MG tablet, Take 1 tablet (5 mg total) by mouth once daily., Disp: 90 tablet, Rfl: 1    abiraterone (ZYTIGA) 250 mg Tab, Take 4 tablets (1,000 mg total) by mouth once daily. (Patient not taking: Reported on 2024.), Disp: 120 tablet, Rfl: 11    amoxicillin (AMOXIL) 500 MG capsule, TAKE 1 CAPSULE BY MOUTH THREE TIMES DAILY UNTIL GONE (Patient not taking: Reported on 3/24/2025), Disp: ,  Rfl:     eplerenone (INSPRA) 25 MG Tab, Take 1 tablet (25 mg total) by mouth once daily. (Patient not taking: Reported on 3/24/2025), Disp: 90 tablet, Rfl: 3    predniSONE (DELTASONE) 5 MG tablet, Take 1 tablet by mouth once daily (Patient not taking: Reported on 8/21/2024), Disp: 90 tablet, Rfl: 0    ramipriL (ALTACE) 10 MG capsule, Take 1 capsule (10 mg total) by mouth once daily. (Patient not taking: Reported on 3/24/2025), Disp: 90 capsule, Rfl: 3    PMHx/PSHx Updates:  See patient's last visit with me on  12/9/2024  See H&P on 5/16/2022        Pathology:   Cancer Staging   History of prostate cancer  Staging form: Prostate, AJCC 8th Edition  - Clinical: Stage IIIC (cT2a, cN0, cM0, PSA: 23.7, Grade Group: 5) - Signed by Jamie Martin Jr., MD on 5/6/2022      CT abdom/pelvis  4/2/7/2022:     IMPRESSION: Ovoid sclerotic lesions at L5, left-sided sacrum and left superior pubic ramus most likely representing bone island however metastatic disease cannot be excluded and correlation with bone scan is recommended     Levoscoliosis with multilevel degenerative disc disease and facet hypertrophy     Sigmoid diverticulosis without diverticulitis        Bone scan 4/27/2022:     IMPRESSION: Increased activity in joint central distribution as well as the spine compatible with degenerative changes     No evidence of metastatic disease     The abnormality seen on CT most likely represent bone islands     MRI prostate  5/12/2022:      Impression:     1. PIRADS 5.  Large lesion in the right peripheral zone with clinically significant cancer highly likely to be present.  Bulging of the prostate capsule raises the possibility of extracapsular extension.  2. Lesion is in close proximity to the right neurovascular bundle and also extends into the right seminal vesicles.  3. There are prominent but nonenlarged mesorectal lymph nodes which are indeterminate as to the possibility of metastatic disease and warrant continued attention  "on follow-up imaging.  4. There is also an indeterminate sclerotic bone lesion in the left pubic body.  5. Benign prostatic hypertrophy.  Overall Assessment:     PIRADS 1 (clinically significant cancer is highly unlikely to be present)     PIRADS 2 (clinically significant cancer is unlikely to be present)     PIRADS 3 (the presence of clinically significant cancer is equivocal)     PIRADS 4 (clinically significant cancer is likely to be present)     PIRADS 5 (clinically significant cancer is highly likely to be present)     Number of targets created for potential MR/US fusion biopsy     Peripheral zone: 1     Transition zone: 0           Objective:     Vitals:  Blood pressure (P) 137/72, pulse (!) 46, temperature 97.5 °F (36.4 °C), temperature source Temporal, resp. rate 16, height 5' 9" (1.753 m), weight 79.8 kg (176 lb), SpO2 97%.    Physical Examination:   GEN: no apparent distress, comfortable; AAOx3  HEAD: atraumatic and normocephalic  EYES: no pallor, no icterus, PERRLA  ENT: OMM, no pharyngeal erythema, external ears WNL; no nasal discharge; no thrush  NECK: no masses, thyroid normal, trachea midline, no LAD/LN's, supple  CV: RRR with no murmur; normal pulse; normal S1 and S2; no pedal edema  CHEST: Normal respiratory effort; CTAB; normal breath sounds; no wheeze or crackles  ABDOM: nontender and nondistended; soft; normal bowel sounds; no rebound/guarding  MUSC/Skeletal: ROM normal; no crepitus; joints normal; no deformities or arthropathy  EXTREM: no clubbing, cyanosis, inflammation or swelling  SKIN: no rashes, lesions, ulcers, petechiae or subcutaneous nodules  : no florez  NEURO: grossly intact; motor/sensory WNL; AAOx3; no tremors  PSYCH: normal mood, affect and behavior  LYMPH: normal cervical, supraclavicular, axillary and groin LN's            Labs:     Lab Results   Component Value Date    WBC 4.04 03/17/2025    HGB 13.7 (L) 03/17/2025    HCT 41.8 03/17/2025    MCV 94 03/17/2025     " 03/17/2025       CMP  Sodium   Date Value Ref Range Status   03/17/2025 138 136 - 145 mmol/L Final     Potassium   Date Value Ref Range Status   03/17/2025 4.7 3.5 - 5.1 mmol/L Final     Chloride   Date Value Ref Range Status   03/17/2025 103 95 - 110 mmol/L Final     CO2   Date Value Ref Range Status   03/17/2025 28 23 - 29 mmol/L Final     Glucose   Date Value Ref Range Status   03/17/2025 115 (H) 70 - 110 mg/dL Final     BUN   Date Value Ref Range Status   03/17/2025 18 8 - 23 mg/dL Final     Creatinine   Date Value Ref Range Status   03/17/2025 0.9 0.5 - 1.4 mg/dL Final     Calcium   Date Value Ref Range Status   03/17/2025 10.1 8.7 - 10.5 mg/dL Final     Total Protein   Date Value Ref Range Status   03/17/2025 6.7 6.0 - 8.4 g/dL Final     Albumin   Date Value Ref Range Status   03/17/2025 4.3 3.5 - 5.2 g/dL Final     Total Bilirubin   Date Value Ref Range Status   03/17/2025 0.6 0.1 - 1.0 mg/dL Final     Comment:     For infants and newborns, interpretation of results should be based  on gestational age, weight and in agreement with clinical  observations.    Premature Infant recommended reference ranges:  Up to 24 hours.............<8.0 mg/dL  Up to 48 hours............<12.0 mg/dL  3-5 days..................<15.0 mg/dL  6-29 days.................<15.0 mg/dL       Alkaline Phosphatase   Date Value Ref Range Status   03/17/2025 77 55 - 135 U/L Final     AST   Date Value Ref Range Status   03/17/2025 19 10 - 40 U/L Final     ALT   Date Value Ref Range Status   03/17/2025 18 10 - 44 U/L Final     Anion Gap   Date Value Ref Range Status   03/17/2025 7 (L) 8 - 16 mmol/L Final     eGFR if    Date Value Ref Range Status   07/05/2022 >60.0 >60 mL/min/1.73 m^2 Final     eGFR if non    Date Value Ref Range Status   07/05/2022 >60.0 >60 mL/min/1.73 m^2 Final     Comment:     Calculation used to obtain the estimated glomerular filtration  rate (eGFR) is the CKD-EPI equation.        Lab  Results   Component Value Date    IRON 100 03/17/2025    TRANSFERRIN 249 03/17/2025    TIBC 349 03/17/2025    FESATURATED 29 03/17/2025      Lab Results   Component Value Date    FERRITIN 98.9 03/17/2025             Lab Results   Component Value Date    RCJNMTVO11 660 12/04/2024     Lab Results   Component Value Date    FOLATE >22.3 12/04/2024       PSA Diagnostic 0.00 - 4.00 ng/mL 0.06         Radiology/Diagnostic Studies:    CXR  3/1/2024:      IMPRESSION: No evidence of active cardiopulmonary disease.       CXR 2/16/2023:  IMPRESSION:  1. No acute radiographic abnormalities or detrimental changes compared to September 2021.        I have reviewed all available lab results and radiology reports.    Assessment/Plan:   (1) 70 y.o. male with diagnosis of prostate cancer who has been referred by Jamie Martin Jr., MD for evaluation by medical hematology/oncology. 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 He was recently diagnosed with prostate cancer after having been found to have an elevated PSA at 23.7. He underwent a transrectal US with biopsy with Dr Walter with  on 4/18/2022.       - Pathology came back showing adenocarcinoma in 5 out of 8 cores with: 80%  RB [place] 4+5 (Group 5), 70% RM [place] 3+4 (Group 3); 30% RA [place] 3 + 4 (Group 2); and 25% LB [place] 3 + 3.   - perineural invasion per path report; [place] 4+5  - He had a CT abdomen pelvis on 4/27 which showed sclerotic lesions at L5, left sacrum and left superior pubic ramus with differential of bone island versus metastatic disease, however, the bone scan on 4/27/2022 was negative.   - Stage IIIC (cT2a N0 M0) per Dr Martin's assessment     - reviewed and discussed the pathology and the latest NCCN guidelines (vers. 4.2022)  - he is considered high/very high risk category given the NCCN criteria  - already started on Eligard ADT about 3 weeks ago  - discussed benefit with addition of abiraterone (Zytiga)  - patient wants to be aggressive with  his therapy  - will set up with chemotherapy school; discuss the side-effect profile of the drugs and provide literature on the drugs    6/22/2022:  - he is now on the abiraterone and XRT with ADT  - he is on week #4 of the XRT  - slight drop in hgb and plats but no unexpected  - some diarrhea but very mild    8/17/2022:  - He completed XRT about 2-3 weeks ago and his symptoms improved after about 7-10 days;  - he is continued on zytiga/ADT. He had some recent labs.   - He had eligard injection last Wednesday and continues ADT every 3 months    11/16/2022:  - he is continued on the zytiga and ADT  - some fatigue  - some increase in BP -  I instructed him to monitor his BP at home and if it continues to run high then f/u with Dr Sofia sooner than March 2023 2/16/2023:  - continued on zytiga/pred and ADT  - PSA remains <0.01    5/11/2023:  - doing ok with no new issues  - PSA <0.01  - continued on the zytiga and ADT    8/17/2023:  - continued on ADT and zytiga  - PSa remains <0.01  - mild borderline anemia with normal iron panel, B1q2 and folate    2/29/2024:  - due to discontinue the Zytiga in May 2024  - PSA remains <0.01    5/29/20214:  - completed two year course of Zytiga last week  - PSA <0.01    8/21/2024:  - he completed the two year course of ADT and zytiga in May 2024  - PSA <0.01    12/9/2024:  - CBC overall adequate with WBC and plat WNL and hgb practically WNL at 13.9  - PSA at 0.02  - sees Dr Walter in Jan 2025 with   - iron panel and B12/folate adequate  - continue ion MVI    3/24/2025:  - slight increase in PSA and up to 0.06 from o.2  - due to see Dr Walter in couple months  - he is quite active and bikes etc             (2) Family Hx/of prostate cancer (dad), breast ca (sister), lymphoma (dad)     (3) Hx/of left rotator cuff/acromioplasty surgery - Spet 2021 with Dr Craven     (4) GERD - prior colonoscopy with Dr Cornejo with GI in Jan 2023     (5) History of positive PPD - prior TB  Gold positive   - Dr Sofia is aware    (6) Mild leucopenia and chronic mild anemia - possibly residual from the previous zytiga therapy  - mild eosinophilia - he has chronic hay-fever issues    8/21/2024  - latest  wbc at 3.7, hgb 13.0 and plats 177,000  - ANC was 1.6  - continue to monitor every 3 months    12/9/2024:  - latest labs fairly adequate  -  CBC overall adequate with WBC and plat WNL and hgb practically WNL at 13.9  - iron panel and B12/folate adequate    3/24/2025:  - WBC currently WNL  - mild residual borderline anemia with hgb at 13.7  - iron panel adequate and B12/folate were adequate    VISIT DIAGNOSES:      History of prostate cancer          PLAN:  1. discontinued ADT and zytiga in May 2024  2  f/u with Dr Walter in Apr 2025  3. check labs every 3 months for now unless otherwise directed by   3. BP Management as per primary    4. F/u with PCP, , Rad/onc as directed      RTC in 3-4 months     Fax note to Jamie Martin Jr., MD; Kimberlyn Walter [place], [place]       Discussion:     COVID-19 Discussion:    I had long discussion with patient and any applicable family about the COVID-19 coronavirus epidemic and the recommended precautions with regard to cancer and/or hematology patients. I have re-iterated the CDC recommendations for adequate hand washing, use of hand -like products, and coughing into elbow, etc. In addition, especially for our patients who are on chemotherapy and/or our otherwise immunocompromised patients, I have recommended avoidance of crowds, including movie theaters, restaurants, churches, etc. I have recommended avoidance of any sick or symptomatic family members and/or friends. I have also recommended avoidance of any raw and unwashed food products, and general avoidance of food items that have not been prepared by themselves. The patient has been asked to call us immediately with any symptom developments, issues, questions or other general  "concerns.       Pathology Discussion:    I reviewed and discussed the pathology report(s) and radiograph reports (if available) in as simple to understand and/or laymen's terms to the best of my ability. I had an indepth conversation with the patient and went over the patient's individual diagnosis based on the information that was currently available. I discussed the TNM staging process with regard to the patient's particular cancer type, and the calculated stage based on the currently available TNM data and literature. I discussed the available prognostic data with regard to the current staging information and how it relates to the prognosis of their particular neoplastic process.        NCCN Guidelines:    I discussed the available treatment option(s) in accordance with the latest literature from the NCCN Clinical Practice Guidelines for the patient's particular type of cancer disorder. The NCCN Guidelines provide a "document evidence-based (and) consensus-driven management" of the care of oncology patients. The treatment recommendations were made not only in accordance to the NCCN guidelines, but also factored in to account the patient's overall age, condition, performance status and their medical co-morbidities. I went over the risks and benefits of the the treatment options (if any could be made) with regard to their particular cancer type, their cancer stage, their age, and their co-morbidities.       Chemotherapy Discussion:      I discussed the available treatment option(s) in accordance with the latest/current national evidence-based guidelines (NCCN, UpToDate, NCI, ASCO, etc where applicable), their overall age/condition and their co-morbidities. I also went over the risks and benefits of the chemotherapy with regard to their particular cancer type, their cancer stage, their age/condition, and their co-morbidities. I provided literature on the chemotherapy regimen and discussed the chemotherapy " side-effect profiles of the drug(s). I discussed the importance of compliance with obtaining and monitoring weekly lab work, and went over the potential hematopathology issues and risks with anemia, leucopenia and thrombocytopenia that can occur with chemotherapy. I discussed the potential risks of liver and kidney damage, which could be permanent and could necessitate dialysis long-term if kidney failure developed. I discussed the emetic and/or diarrheal potential of the regimen and the potential need for use of antiemetic and anti-diarrheal medications. I discussed the risk for development of anaphylactic shock, bronchospasm, dysrhythmia, and respiratory/cardiovascular arrest and/or failure. I discussed the potential risks for development of alopecia, cold sensory issues, ringing in ears, vertigo, cataracts, glaucoma, and neuropathy, all of which could end up being chronic and life-long. Some chemotherpyI discussed the risks of hand-foot syndrome and rashes, and development of other autoimmune mediated processes such as pneumonitis, hepatitis, and colitis which could be life threatening. I discussed the risks of the potential development of a rare but fatal viral mediated disease known as PML (Progressive Multifocal Leukoencephalopathy), and risk of future development of leukemia and/or lymphoma from use of certain chemotherapy agents. I discussed the need for neutropenic precautions, basic hygiene/sanitation behaviors and dietary restrictions.    The patient's consent has been obtained to proceed with the chemotherapy.The patient will be referred to Chemotherapy School /[facility] for training and education on chemotherapy, use of antiemetics and/or anti-diarrheals, use of NSAID's, potential chemotherapy side-effects, and any specific recommendations and precautions with the particular chemotherapy agents.      I answered all of the patient's (and family's, if applicable) questions to the best of my  ability and to their complete satisfaction. The patient acknowledged full understanding of the risks, recommendations and plan(s).     I spent over 25 mins of time with the patient. Reviewed results of the recently ordered labs, tests and studies; made directives with regards to the results. Over half of this time was spent couseling and coordinating care.    I have explained all of the above in detail and the patient understands all of the current recommendation(s). I have answered all of their questions to the best of my ability and to their complete satisfaction.   The patient is to continue with the current management plan.            Electronically signed by Kip Wright MD

## 2025-06-16 ENCOUNTER — TELEPHONE (OUTPATIENT)
Facility: CLINIC | Age: 70
End: 2025-06-16
Payer: MEDICARE

## 2025-06-16 ENCOUNTER — LAB VISIT (OUTPATIENT)
Dept: LAB | Facility: HOSPITAL | Age: 70
End: 2025-06-16
Attending: INTERNAL MEDICINE
Payer: MEDICARE

## 2025-06-16 DIAGNOSIS — Z85.46 HISTORY OF PROSTATE CANCER: ICD-10-CM

## 2025-06-16 DIAGNOSIS — R71.8 OTHER ABNORMALITY OF RED BLOOD CELLS: ICD-10-CM

## 2025-06-16 LAB
ABSOLUTE EOSINOPHIL (SMH): 0.29 K/UL
ABSOLUTE MONOCYTE (SMH): 0.59 K/UL (ref 0.3–1)
ABSOLUTE NEUTROPHIL COUNT (SMH): 2 K/UL (ref 1.8–7.7)
ALBUMIN SERPL-MCNC: 3.8 G/DL (ref 3.5–5.2)
ALP SERPL-CCNC: 68 UNIT/L (ref 55–135)
ALT SERPL-CCNC: 17 UNIT/L (ref 10–44)
ANION GAP (SMH): 5 MMOL/L (ref 8–16)
AST SERPL-CCNC: 19 UNIT/L (ref 10–40)
BASOPHILS # BLD AUTO: 0.03 K/UL
BASOPHILS NFR BLD AUTO: 0.7 %
BILIRUB SERPL-MCNC: 0.3 MG/DL (ref 0.1–1)
BUN SERPL-MCNC: 17 MG/DL (ref 8–23)
CALCIUM SERPL-MCNC: 8.8 MG/DL (ref 8.7–10.5)
CHLORIDE SERPL-SCNC: 108 MMOL/L (ref 95–110)
CO2 SERPL-SCNC: 26 MMOL/L (ref 23–29)
CREAT SERPL-MCNC: 1 MG/DL (ref 0.5–1.4)
ERYTHROCYTE [DISTWIDTH] IN BLOOD BY AUTOMATED COUNT: 14.7 % (ref 11.5–14.5)
FERRITIN SERPL-MCNC: 54 NG/ML (ref 20–300)
GFR SERPLBLD CREATININE-BSD FMLA CKD-EPI: >60 ML/MIN/1.73/M2
GLUCOSE SERPL-MCNC: 112 MG/DL (ref 70–110)
HCT VFR BLD AUTO: 38.7 % (ref 40–54)
HGB BLD-MCNC: 12.7 GM/DL (ref 14–18)
IMM GRANULOCYTES # BLD AUTO: 0.01 K/UL (ref 0–0.04)
IMM GRANULOCYTES NFR BLD AUTO: 0.2 % (ref 0–0.5)
IRON SATN MFR SERPL: 18 % (ref 20–50)
IRON SERPL-MCNC: 57 UG/DL (ref 45–160)
LYMPHOCYTES # BLD AUTO: 1.5 K/UL (ref 1–4.8)
MCH RBC QN AUTO: 31.5 PG (ref 27–31)
MCHC RBC AUTO-ENTMCNC: 32.8 G/DL (ref 32–36)
MCV RBC AUTO: 96 FL (ref 82–98)
NUCLEATED RBC (/100WBC) (SMH): 0 /100 WBC
PLATELET # BLD AUTO: 174 K/UL (ref 150–450)
PMV BLD AUTO: 10.8 FL (ref 9.2–12.9)
POTASSIUM SERPL-SCNC: 4.5 MMOL/L (ref 3.5–5.1)
PROT SERPL-MCNC: 6.3 GM/DL (ref 6–8.4)
PSA SERPL-MCNC: 0.08 NG/ML (ref ?–4)
RBC # BLD AUTO: 4.03 M/UL (ref 4.6–6.2)
RELATIVE EOSINOPHIL (SMH): 6.6 % (ref 0–8)
RELATIVE LYMPHOCYTE (SMH): 34.2 % (ref 18–48)
RELATIVE MONOCYTE (SMH): 13.5 % (ref 4–15)
RELATIVE NEUTROPHIL (SMH): 44.8 % (ref 38–73)
SODIUM SERPL-SCNC: 139 MMOL/L (ref 136–145)
TIBC SERPL-MCNC: 322 UG/DL (ref 250–450)
TRANSFERRIN SERPL-MCNC: 230 MG/DL (ref 200–375)
WBC # BLD AUTO: 4.38 K/UL (ref 3.9–12.7)

## 2025-06-16 PROCEDURE — 84460 ALANINE AMINO (ALT) (SGPT): CPT

## 2025-06-16 PROCEDURE — 36415 COLL VENOUS BLD VENIPUNCTURE: CPT

## 2025-06-16 PROCEDURE — 83540 ASSAY OF IRON: CPT

## 2025-06-16 PROCEDURE — 82728 ASSAY OF FERRITIN: CPT

## 2025-06-16 PROCEDURE — 84153 ASSAY OF PSA TOTAL: CPT

## 2025-06-16 PROCEDURE — 85025 COMPLETE CBC W/AUTO DIFF WBC: CPT

## 2025-06-23 ENCOUNTER — OFFICE VISIT (OUTPATIENT)
Facility: CLINIC | Age: 70
End: 2025-06-23
Payer: MEDICARE

## 2025-06-23 VITALS
HEART RATE: 45 BPM | TEMPERATURE: 98 F | WEIGHT: 176 LBS | OXYGEN SATURATION: 96 % | HEIGHT: 69 IN | BODY MASS INDEX: 26.07 KG/M2 | SYSTOLIC BLOOD PRESSURE: 143 MMHG | RESPIRATION RATE: 17 BRPM | DIASTOLIC BLOOD PRESSURE: 77 MMHG

## 2025-06-23 DIAGNOSIS — Z85.46 HISTORY OF PROSTATE CANCER: Primary | ICD-10-CM

## 2025-06-23 DIAGNOSIS — D50.8 OTHER IRON DEFICIENCY ANEMIA: ICD-10-CM

## 2025-06-23 PROCEDURE — 99999 PR PBB SHADOW E&M-EST. PATIENT-LVL III: CPT | Mod: PBBFAC,,, | Performed by: INTERNAL MEDICINE

## 2025-06-23 PROCEDURE — 99213 OFFICE O/P EST LOW 20 MIN: CPT | Mod: PBBFAC,PN | Performed by: INTERNAL MEDICINE

## 2025-06-23 RX ORDER — IRON,CARBONYL/ASCORBIC ACID 100-250 MG
1 TABLET ORAL DAILY
Qty: 30 EACH | Refills: 6 | Status: ACTIVE | OUTPATIENT
Start: 2025-06-23

## 2025-06-23 NOTE — PROGRESS NOTES
[facility] Hematology/Oncology  PROGRESS NOTE - Follow-up Visit      Subjective:       Patient ID:   NAME: Rusty Sen II : 1955     70 y.o. male    Referring Doc: Jamie Martin Jr., MD  Other Physicians: Merissa Sofia; Jose Angel           Chief Complaint: prostate cancer f/u       History of Present Illness:     Patient returns today for a regularly scheduled follow-up visit.  The patient is here today to go over the results of the recently ordered labs, tests and studies. He is here by himself.        He previously had completed XRT and subsequently completed the two year course of ADT and zytiga.  He sees Dr Walter again in a couple of months    He previously had had colonoscopy in 2024 with Dr Cornejo    He last saw Dr Geena Adames with derm on  2025    Breathing ok, No CP, SOB, HA's or N/V; He denies any current fatigue.                       ROS:   GEN: normal without any fever, night sweats or weight loss  HEENT: normal with no HA's, sore throat, stiff neck, changes in vision  CV: normal with no CP, SOB, PND, FAIRCHILD or orthopnea  PULM: normal with no SOB, cough, hemoptysis, sputum or pleuritic pain  GI: normal with no abdominal pain, nausea, vomiting, constipation, diarrhea, melanotic stools, BRBPR, or hematemesis  : normal with no hematuria, dysuria  BREAST: normal with no mass, discharge, pain  SKIN: normal with no rash, erythema, bruising, or swelling    Pain Scale:  0  Allergies:  Review of patient's allergies indicates:  No Known Allergies    Medications:    Current Outpatient Medications:     abiraterone (ZYTIGA) 250 mg Tab, Take 4 tablets (1,000 mg total) by mouth once daily. (Patient not taking: Reported on 2025.), Disp: 120 tablet, Rfl: 11    amLODIPine (NORVASC) 5 MG tablet, Take 1 tablet (5 mg total) by mouth once daily., Disp: 90 tablet, Rfl: 1    amoxicillin (AMOXIL) 500 MG capsule, , Disp: , Rfl:     eplerenone (INSPRA) 25 MG Tab, Take 1 tablet (25 mg total) by mouth  once daily. (Patient not taking: Reported on 3/24/2025), Disp: 90 tablet, Rfl: 3    predniSONE (DELTASONE) 5 MG tablet, Take 1 tablet by mouth once daily (Patient not taking: Reported on 6/23/2025), Disp: 90 tablet, Rfl: 0    ramipriL (ALTACE) 10 MG capsule, Take 1 capsule (10 mg total) by mouth once daily. (Patient not taking: Reported on 3/24/2025), Disp: 90 capsule, Rfl: 3    PMHx/PSHx Updates:  See patient's last visit with me on  3/24/2025  See H&P on 5/16/2022        Pathology:   Cancer Staging   History of prostate cancer  Staging form: Prostate, AJCC 8th Edition  - Clinical: Stage IIIC (cT2a, cN0, cM0, PSA: 23.7, Grade Group: 5) - Signed by Jamie Martin Jr., MD on 5/6/2022      CT abdom/pelvis  4/2/7/2022:     IMPRESSION: Ovoid sclerotic lesions at L5, left-sided sacrum and left superior pubic ramus most likely representing bone island however metastatic disease cannot be excluded and correlation with bone scan is recommended     Levoscoliosis with multilevel degenerative disc disease and facet hypertrophy     Sigmoid diverticulosis without diverticulitis        Bone scan 4/27/2022:     IMPRESSION: Increased activity in joint central distribution as well as the spine compatible with degenerative changes     No evidence of metastatic disease     The abnormality seen on CT most likely represent bone islands     MRI prostate  5/12/2022:      Impression:     1. PIRADS 5.  Large lesion in the right peripheral zone with clinically significant cancer highly likely to be present.  Bulging of the prostate capsule raises the possibility of extracapsular extension.  2. Lesion is in close proximity to the right neurovascular bundle and also extends into the right seminal vesicles.  3. There are prominent but nonenlarged mesorectal lymph nodes which are indeterminate as to the possibility of metastatic disease and warrant continued attention on follow-up imaging.  4. There is also an indeterminate sclerotic bone  "lesion in the left pubic body.  5. Benign prostatic hypertrophy.  Overall Assessment:     PIRADS 1 (clinically significant cancer is highly unlikely to be present)     PIRADS 2 (clinically significant cancer is unlikely to be present)     PIRADS 3 (the presence of clinically significant cancer is equivocal)     PIRADS 4 (clinically significant cancer is likely to be present)     PIRADS 5 (clinically significant cancer is highly likely to be present)     Number of targets created for potential MR/US fusion biopsy     Peripheral zone: 1     Transition zone: 0           Objective:     Vitals:  Blood pressure (!) 143/77, pulse (!) 45, temperature 97.8 °F (36.6 °C), temperature source Temporal, resp. rate 17, height 5' 9" (1.753 m), weight 79.8 kg (176 lb), SpO2 96%.    Physical Examination:   GEN: no apparent distress, comfortable; AAOx3  HEAD: atraumatic and normocephalic  EYES: no pallor, no icterus, PERRLA  ENT: OMM, no pharyngeal erythema, external ears WNL; no nasal discharge; no thrush  NECK: no masses, thyroid normal, trachea midline, no LAD/LN's, supple  CV: RRR with no murmur; normal pulse; normal S1 and S2; no pedal edema  CHEST: Normal respiratory effort; CTAB; normal breath sounds; no wheeze or crackles  ABDOM: nontender and nondistended; soft; normal bowel sounds; no rebound/guarding  MUSC/Skeletal: ROM normal; no crepitus; joints normal; no deformities or arthropathy  EXTREM: no clubbing, cyanosis, inflammation or swelling  SKIN: no rashes, lesions, ulcers, petechiae or subcutaneous nodules  : no florez  NEURO: grossly intact; motor/sensory WNL; AAOx3; no tremors  PSYCH: normal mood, affect and behavior  LYMPH: normal cervical, supraclavicular, axillary and groin LN's            Labs:     Lab Results   Component Value Date    WBC 4.38 06/16/2025    HGB 12.7 (L) 06/16/2025    HCT 38.7 (L) 06/16/2025    MCV 96 06/16/2025     06/16/2025       CMP  Sodium   Date Value Ref Range Status   06/16/2025 139 " 136 - 145 mmol/L Final     Potassium   Date Value Ref Range Status   06/16/2025 4.5 3.5 - 5.1 mmol/L Final     Chloride   Date Value Ref Range Status   06/16/2025 108 95 - 110 mmol/L Final     CO2   Date Value Ref Range Status   06/16/2025 26 23 - 29 mmol/L Final     Glucose   Date Value Ref Range Status   06/16/2025 112 (H) 70 - 110 mg/dL Final     BUN   Date Value Ref Range Status   06/16/2025 17 8 - 23 mg/dL Final     Creatinine   Date Value Ref Range Status   06/16/2025 1.0 0.5 - 1.4 mg/dL Final     Calcium   Date Value Ref Range Status   06/16/2025 8.8 8.7 - 10.5 mg/dL Final     Protein Total   Date Value Ref Range Status   06/16/2025 6.3 6.0 - 8.4 gm/dL Final     Albumin   Date Value Ref Range Status   06/16/2025 3.8 3.5 - 5.2 g/dL Final     Bilirubin Total   Date Value Ref Range Status   06/16/2025 0.3 0.1 - 1.0 mg/dL Final     Comment:     For infants and newborns, interpretation of results should be based   on gestational age, weight and in agreement with clinical   observations.    Premature Infant recommended reference ranges:   0-24 hours:  <8.0 mg/dL   24-48 hours: <12.0 mg/dL   3-5 days:    <15.0 mg/dL   6-29 days:   <15.0 mg/dL     ALP   Date Value Ref Range Status   06/16/2025 68 55 - 135 unit/L Final     AST   Date Value Ref Range Status   06/16/2025 19 10 - 40 unit/L Final     ALT   Date Value Ref Range Status   06/16/2025 17 10 - 44 unit/L Final     Anion Gap   Date Value Ref Range Status   06/16/2025 5 (L) 8 - 16 mmol/L Final     eGFR if    Date Value Ref Range Status   07/05/2022 >60.0 >60 mL/min/1.73 m^2 Final     eGFR if non    Date Value Ref Range Status   07/05/2022 >60.0 >60 mL/min/1.73 m^2 Final     Comment:     Calculation used to obtain the estimated glomerular filtration  rate (eGFR) is the CKD-EPI equation.        Lab Results   Component Value Date    IRON 57 06/16/2025    TRANSFERRIN 230 06/16/2025    TIBC 322 06/16/2025    LABIRON 18 (L) 06/16/2025     FESATURATED 29 03/17/2025      Lab Results   Component Value Date    FERRITIN 54.0 06/16/2025             Lab Results   Component Value Date    VRWCHYYA68 660 12/04/2024     Lab Results   Component Value Date    FOLATE >22.3 12/04/2024       PSA 0.08        Radiology/Diagnostic Studies:    CXR  3/1/2024:      IMPRESSION: No evidence of active cardiopulmonary disease.       CXR 2/16/2023:  IMPRESSION:  1. No acute radiographic abnormalities or detrimental changes compared to September 2021.        I have reviewed all available lab results and radiology reports.    Assessment/Plan:   (1) 70 y.o. male with diagnosis of prostate cancer who has been referred by Jamie Martin Jr., MD for evaluation by medical hematology/oncology. 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 He was recently diagnosed with prostate cancer after having been found to have an elevated PSA at 23.7. He underwent a transrectal US with biopsy with Dr Walter with  on 4/18/2022.       - Pathology came back showing adenocarcinoma in 5 out of 8 cores with: 80%  RB [place] 4+5 (Group 5), 70% RM [place] 3+4 (Group 3); 30% RA [place] 3 + 4 (Group 2); and 25% LB [place] 3 + 3.   - perineural invasion per path report; [place] 4+5  - He had a CT abdomen pelvis on 4/27 which showed sclerotic lesions at L5, left sacrum and left superior pubic ramus with differential of bone island versus metastatic disease, however, the bone scan on 4/27/2022 was negative.   - Stage IIIC (cT2a N0 M0) per Dr Martin's assessment     - reviewed and discussed the pathology and the latest NCCN guidelines (vers. 4.2022)  - he is considered high/very high risk category given the NCCN criteria  - already started on Eligard ADT about 3 weeks ago  - discussed benefit with addition of abiraterone (Zytiga)  - patient wants to be aggressive with his therapy  - will set up with chemotherapy school; discuss the side-effect profile of the drugs and provide literature on the  drugs    6/22/2022:  - he is now on the abiraterone and XRT with ADT  - he is on week #4 of the XRT  - slight drop in hgb and plats but no unexpected  - some diarrhea but very mild    8/17/2022:  - He completed XRT about 2-3 weeks ago and his symptoms improved after about 7-10 days;  - he is continued on zytiga/ADT. He had some recent labs.   - He had eligard injection last Wednesday and continues ADT every 3 months    11/16/2022:  - he is continued on the zytiga and ADT  - some fatigue  - some increase in BP -  I instructed him to monitor his BP at home and if it continues to run high then f/u with Dr Sofia sooner than March 2023 2/16/2023:  - continued on zytiga/pred and ADT  - PSA remains <0.01    5/11/2023:  - doing ok with no new issues  - PSA <0.01  - continued on the zytiga and ADT    8/17/2023:  - continued on ADT and zytiga  - PSa remains <0.01  - mild borderline anemia with normal iron panel, B1q2 and folate    2/29/2024:  - due to discontinue the Zytiga in May 2024  - PSA remains <0.01    5/29/20214:  - completed two year course of Zytiga last week  - PSA <0.01    8/21/2024:  - he completed the two year course of ADT and zytiga in May 2024  - PSA <0.01    12/9/2024:  - CBC overall adequate with WBC and plat WNL and hgb practically WNL at 13.9  - PSA at 0.02  - sees Dr Walter in Jan 2025 with   - iron panel and B12/folate adequate  - continue ion MVI    3/24/2025:  - slight increase in PSA and up to 0.06 from o.2  - due to see Dr Walter in couple months  - he is quite active and bikes etc    6/23/2025:  - latest PSA at 0.08 and rising slightly since coming off antiandrogen therapy in May 2024  - he is seeing Dr Walter again in 1-2 weeks  - continue to monitor for stabilization of the PSA level             (2) Family Hx/of prostate cancer (dad), breast ca (sister), lymphoma (dad)     (3) Hx/of left rotator cuff/acromioplasty surgery - Spet 2021 with Dr Craven     (4) GERD - prior  colonoscopy with Dr Cornejo with GI in Jan 2023     (5) History of positive PPD - prior TB Gold positive   - Dr Sofia is aware    (6) Mild leucopenia and chronic mild anemia - possibly residual from the previous zytiga therapy  - mild eosinophilia - he has chronic hay-fever issues    8/21/2024  - latest  wbc at 3.7, hgb 13.0 and plats 177,000  - ANC was 1.6  - continue to monitor every 3 months    12/9/2024:  - latest labs fairly adequate  -  CBC overall adequate with WBC and plat WNL and hgb practically WNL at 13.9  - iron panel and B12/folate adequate    3/24/2025:  - WBC currently WNL  - mild residual borderline anemia with hgb at 13.7  - iron panel adequate and B12/folate were adequate    6/23/2025:  - hgb 12.7  - WBC at 4.38; plats 174,000  - iron at 57, iron sat a little low at 18% this time; ferritin 54    VISIT DIAGNOSES:      History of prostate cancer          PLAN:  1. previously had discontinued ADT and zytiga in May 2024  2  f/u with Dr Walter in 1-2 weeks  3. check labs every 3 months for now unless otherwise directed by   3. BP Management as per primary   4. Start ICAR-C po daily   5. F/u with PCP, , Rad/onc as directed      RTC in 3-4 months     Fax note to Jamie Martin Jr., MD; Kimberlyn Walter [place], [place]       Discussion:     COVID-19 Discussion:    I had long discussion with patient and any applicable family about the COVID-19 coronavirus epidemic and the recommended precautions with regard to cancer and/or hematology patients. I have re-iterated the CDC recommendations for adequate hand washing, use of hand -like products, and coughing into elbow, etc. In addition, especially for our patients who are on chemotherapy and/or our otherwise immunocompromised patients, I have recommended avoidance of crowds, including movie theaters, restaurants, churches, etc. I have recommended avoidance of any sick or symptomatic family members and/or friends. I have also  "recommended avoidance of any raw and unwashed food products, and general avoidance of food items that have not been prepared by themselves. The patient has been asked to call us immediately with any symptom developments, issues, questions or other general concerns.       Pathology Discussion:    I reviewed and discussed the pathology report(s) and radiograph reports (if available) in as simple to understand and/or laymen's terms to the best of my ability. I had an indepth conversation with the patient and went over the patient's individual diagnosis based on the information that was currently available. I discussed the TNM staging process with regard to the patient's particular cancer type, and the calculated stage based on the currently available TNM data and literature. I discussed the available prognostic data with regard to the current staging information and how it relates to the prognosis of their particular neoplastic process.        NCCN Guidelines:    I discussed the available treatment option(s) in accordance with the latest literature from the NCCN Clinical Practice Guidelines for the patient's particular type of cancer disorder. The NCCN Guidelines provide a "document evidence-based (and) consensus-driven management" of the care of oncology patients. The treatment recommendations were made not only in accordance to the NCCN guidelines, but also factored in to account the patient's overall age, condition, performance status and their medical co-morbidities. I went over the risks and benefits of the the treatment options (if any could be made) with regard to their particular cancer type, their cancer stage, their age, and their co-morbidities.       Chemotherapy Discussion:      I discussed the available treatment option(s) in accordance with the latest/current national evidence-based guidelines (NCCN, UpToDate, NCI, ASCO, etc where applicable), their overall age/condition and their co-morbidities. I also " went over the risks and benefits of the chemotherapy with regard to their particular cancer type, their cancer stage, their age/condition, and their co-morbidities. I provided literature on the chemotherapy regimen and discussed the chemotherapy side-effect profiles of the drug(s). I discussed the importance of compliance with obtaining and monitoring weekly lab work, and went over the potential hematopathology issues and risks with anemia, leucopenia and thrombocytopenia that can occur with chemotherapy. I discussed the potential risks of liver and kidney damage, which could be permanent and could necessitate dialysis long-term if kidney failure developed. I discussed the emetic and/or diarrheal potential of the regimen and the potential need for use of antiemetic and anti-diarrheal medications. I discussed the risk for development of anaphylactic shock, bronchospasm, dysrhythmia, and respiratory/cardiovascular arrest and/or failure. I discussed the potential risks for development of alopecia, cold sensory issues, ringing in ears, vertigo, cataracts, glaucoma, and neuropathy, all of which could end up being chronic and life-long. Some chemotherpyI discussed the risks of hand-foot syndrome and rashes, and development of other autoimmune mediated processes such as pneumonitis, hepatitis, and colitis which could be life threatening. I discussed the risks of the potential development of a rare but fatal viral mediated disease known as PML (Progressive Multifocal Leukoencephalopathy), and risk of future development of leukemia and/or lymphoma from use of certain chemotherapy agents. I discussed the need for neutropenic precautions, basic hygiene/sanitation behaviors and dietary restrictions.    The patient's consent has been obtained to proceed with the chemotherapy.The patient will be referred to Chemotherapy School /[facility] for training and education on chemotherapy, use of antiemetics and/or  anti-diarrheals, use of NSAID's, potential chemotherapy side-effects, and any specific recommendations and precautions with the particular chemotherapy agents.      I answered all of the patient's (and family's, if applicable) questions to the best of my ability and to their complete satisfaction. The patient acknowledged full understanding of the risks, recommendations and plan(s).     I spent over 25 mins of time with the patient. Reviewed results of the recently ordered labs, tests and studies; made directives with regards to the results. Over half of this time was spent couseling and coordinating care.    I have explained all of the above in detail and the patient understands all of the current recommendation(s). I have answered all of their questions to the best of my ability and to their complete satisfaction.   The patient is to continue with the current management plan.            Electronically signed by Kip Wright MD                              Detail Level: Zone Patient concerned about dark circles under eyes

## (undated) DEVICE — BLADE SCALPEL #11 371111

## (undated) DEVICE — PREP CHLORA 26ML

## (undated) DEVICE — DRAPE IMPERVIOUS SPLIT 89331

## (undated) DEVICE — STAPLER SKIN NON ROTATE PXW35

## (undated) DEVICE — TAPE TT XBRAID 2.0MM    3910-900-018

## (undated) DEVICE — SUTURE VICRYL 3-0 SH 27 VCP416H

## (undated) DEVICE — SUTURE VICRYL #0 36 VCP946H

## (undated) DEVICE — TRIMANO BEACH CHAIR KIT

## (undated) DEVICE — TRAY SKIN PREP DRY

## (undated) DEVICE — TIP BOVIE TEFLON E1450X

## (undated) DEVICE — DRAPE IOBAN 13X13 6640EZ

## (undated) DEVICE — GOWN X-LARGE 044674

## (undated) DEVICE — BLADE AGRESSIVE PLUS 4.0 375-544-000

## (undated) DEVICE — SOLUTION IRRI NS BOTTLE 1000ML R5200-01

## (undated) DEVICE — COVER LIGHT HANDLE LB53

## (undated) DEVICE — TOWEL OR BLUE      MDT2168284

## (undated) DEVICE — SUTURE VICRYL 2-0 CT-1 36 VCP945H

## (undated) DEVICE — BANDAGE COBAN 6 CBN1106

## (undated) DEVICE — SUCTION FRAZIER 12FR 0033120

## (undated) DEVICE — STOCKINETTE IMPERVIOUS 9X44

## (undated) DEVICE — DRAPE 3/4

## (undated) DEVICE — DRAPE U-SLOT 1019

## (undated) DEVICE — SUTURE MONOCRYL 3-0 27 PS-1 MCP936H

## (undated) DEVICE — GLOVE BIOGEL PI   GOLD SIZE 8

## (undated) DEVICE — SLING ULTRA LARGE 11-0138-4

## (undated) DEVICE — TAPE CLOTH 4 MEDIPORE 2864

## (undated) DEVICE — DERMABOND HVD MINI  DHVM12

## (undated) DEVICE — CANNULA SHOULDER 9718

## (undated) DEVICE — SUTURE VICRYL 1 CT-1 27 J261H

## (undated) DEVICE — BUR BARREL 4.0 MM  6 FLUTE  375-941-000

## (undated) DEVICE — PAD BOVIE ADULT

## (undated) DEVICE — POUCH INSTRUMENT 1018

## (undated) DEVICE — NEEDLE RP 360  3910-900-091

## (undated) DEVICE — SET BASIN O R 31451126

## (undated) DEVICE — STERISTRIP 1/2 R1547

## (undated) DEVICE — PACK SHOULDER 88491

## (undated) DEVICE — SOLUTION NACL 0.9% 3000ML

## (undated) DEVICE — SUTURE VICRYL 2-0 27 SH VCP417H

## (undated) DEVICE — TUBING CYSTO DOUBLE 654301

## (undated) DEVICE — SPONGE GAUZE 10S 4X4  442214